# Patient Record
Sex: FEMALE | Race: WHITE | Employment: UNEMPLOYED | URBAN - NONMETROPOLITAN AREA
[De-identification: names, ages, dates, MRNs, and addresses within clinical notes are randomized per-mention and may not be internally consistent; named-entity substitution may affect disease eponyms.]

---

## 2022-03-16 ENCOUNTER — OFFICE VISIT (OUTPATIENT)
Dept: ORTHOPEDIC SURGERY | Age: 77
End: 2022-03-16
Payer: MEDICARE

## 2022-03-16 VITALS — WEIGHT: 210 LBS | HEIGHT: 66 IN | BODY MASS INDEX: 33.75 KG/M2

## 2022-03-16 DIAGNOSIS — M17.11 PRIMARY OSTEOARTHRITIS OF RIGHT KNEE: ICD-10-CM

## 2022-03-16 DIAGNOSIS — M23.203 DEGENERATIVE TEAR OF MEDIAL MENISCUS OF RIGHT KNEE: Primary | ICD-10-CM

## 2022-03-16 DIAGNOSIS — M25.561 RIGHT KNEE PAIN, UNSPECIFIED CHRONICITY: ICD-10-CM

## 2022-03-16 PROCEDURE — 99203 OFFICE O/P NEW LOW 30 MIN: CPT | Performed by: ORTHOPAEDIC SURGERY

## 2022-03-16 NOTE — PROGRESS NOTES
KNEE VISIT      HISTORY OF PRESENT ILLNESS    Patrice Andrade is a 64 y.o. female who presents for evaluation of right knee pain she has had for some time. She said years ago she was treated with some Voltaren gel but did not last long. She is under the care of her back with Dr. Clarisse Iglesias. She complains of grade 3-4 over 10 pain mostly medial side of the right knee with range of motion which increases her pain. She has difficulty with weakness and steps. ROS    Well-documented patient history form dated 3/16/2022  All other ROS negative except for above. Past Surgical history    Past Surgical History:   Procedure Laterality Date    BLADDER REPAIR      CHOLECYSTECTOMY      COLONOSCOPY      HYSTERECTOMY      LUMBAR DISCECTOMY  8/22/11    microlumber L4-5 left. PAST MEDICAL    Past Medical History:   Diagnosis Date    Anesthesia     PONV    Arthritis     Hypertension     Lumbar herniated disc     Lumbar spondylosis     Lumbar stenosis     L3 and L4    Prediabetes     Radiculopathy of lumbar region     Right L4    Scoliosis     Thyroid disease     hypothyroid       Allergies    Allergies   Allergen Reactions    Sulfa Antibiotics      Abdominal pain       Meds    Current Outpatient Medications   Medication Sig Dispense Refill    fluconazole (DIFLUCAN) 100 MG tablet Take 1 tablet by mouth daily. 1 tablet 0    gabapentin (NEURONTIN) 300 MG capsule Take 300 mg by mouth 3 times daily as needed.  lisinopril-hydrochlorothiazide (PRINZIDE;ZESTORETIC) 20-12.5 MG per tablet Take 1 tablet by mouth daily.  diclofenac (VOLTAREN) 75 MG EC tablet Take 75 mg by mouth 2 times daily.  levothyroxine (SYNTHROID) 50 MCG tablet Take 50 mcg by mouth daily.  FLUTICASONE PROPIONATE, NASAL, NA by Nasal route daily.  aspirin 81 MG EC tablet Take 81 mg by mouth daily.  Calcium Carbonate (CALCIUM 600 PO) Take  by mouth 2 times daily.         vitamin E 400 UNIT capsule Take 400 Units by mouth daily.  Ascorbic Acid (VITAMIN C) 500 MG tablet Take 500 mg by mouth daily.  Flaxseed, Linseed, (FLAXSEED OIL PO) Take  by mouth daily.  OMEGA 3-6-9 FATTY ACIDS PO Take  by mouth 2 times daily.  Red Yeast Rice 600 MG CAPS Take 2 tablets by mouth 2 times daily. No current facility-administered medications for this visit. Social    Social History     Socioeconomic History    Marital status:      Spouse name: Not on file    Number of children: Not on file    Years of education: Not on file    Highest education level: Not on file   Occupational History    Not on file   Tobacco Use    Smoking status: Never Smoker    Smokeless tobacco: Never Used   Substance and Sexual Activity    Alcohol use: No    Drug use: No    Sexual activity: Not on file   Other Topics Concern    Not on file   Social History Narrative    Not on file     Social Determinants of Health     Financial Resource Strain:     Difficulty of Paying Living Expenses: Not on file   Food Insecurity:     Worried About Running Out of Food in the Last Year: Not on file    Wood of Food in the Last Year: Not on file   Transportation Needs:     Lack of Transportation (Medical): Not on file    Lack of Transportation (Non-Medical):  Not on file   Physical Activity:     Days of Exercise per Week: Not on file    Minutes of Exercise per Session: Not on file   Stress:     Feeling of Stress : Not on file   Social Connections:     Frequency of Communication with Friends and Family: Not on file    Frequency of Social Gatherings with Friends and Family: Not on file    Attends Anglican Services: Not on file    Active Member of Clubs or Organizations: Not on file    Attends Club or Organization Meetings: Not on file    Marital Status: Not on file   Intimate Partner Violence:     Fear of Current or Ex-Partner: Not on file    Emotionally Abused: Not on file    Physically Abused: Not on file    Sexually Abused: Not on file   Housing Stability:     Unable to Pay for Housing in the Last Year: Not on file    Number of Places Lived in the Last Year: Not on file    Unstable Housing in the Last Year: Not on file       Family HISTORY    Family History   Problem Relation Age of Onset    Diabetes Mother     Cancer Father     Diabetes Sister     Diabetes Brother        PHYSICAL EXAM    Vital Signs:  Ht 5' 6\" (1.676 m)   Wt 210 lb (95.3 kg)   BMI 33.89 kg/m²   General Appearance:  Normal body habitus. Alert and oriented to person, place, and time. Affect:  Normal.   Gait: Slightly antalgic. Good balance and coordination. Skin:  Intact. Sensation:  Intact. Strength:  Intact. Reflexes:  Intact. Pulses:  Intact. Knee Exam:    Effusion: Trace    Range of Motion Right Left   Extension 0 0   Flexion 115 115     Provocative Test Right Left    Positive Negative Positive Negative   Anterior drawer [] [] [] []   Lachman [] [] [] []   Posterior drawer [] [] [] []   Varus testing [] [x] [] [x]   Valgus testing [x] [] [] [x]   Joint line tenderness [x] [] [x] []     Additional Exam Comments: Her neurocirculatory lymphatic exam otherwise is normal symmetric both lower extremities. She has medial joint tenderness to direct palpation valgus stress and Bill's maneuver. IMAGING STUDIES    X-rays 3 views right knee demonstrate grade 2 osteoarthritis good maintenance of joint space medially    IMPRESSION    Right knee pain secondary to degenerative medial meniscus tear and mild osteoarthritis    PLAN      1. Conservative care options including physical therapy, NSAIDs, bracing, and activity modification were discussed. 2.  The indications for therapeutic injections were discussed. 3.  The indications for additional imaging studies were discussed.    4.  After considering the various options discussed, the patient elected to pursue a course that includes cortisone injection right knee, physician directed therapy program, and if not substantial improved over the next several weeks consider scanning. Recommendation is for a cortisone injection into the right knee. After informed consent was received from the patient, the right knee was injected with 1 mL(40mg)Kenalog and 4 mL of 0.25% Marcaine  in the syringe from an anterolateral joint line approach, using a 25-gauge needle, under sterile Betadine prep, using ethyl chloride as a topical refrigerant, for a diagnosis of osteoarthritis. The patient appeared to tolerate it well. The patient should return here periodically as needed. Encounter Diagnoses   Name Primary?     Right knee pain, unspecified chronicity     Primary osteoarthritis of right knee     Degenerative tear of medial meniscus of right knee Yes        Orders Placed This Encounter   Procedures    XR KNEE RIGHT (3 VIEWS)     Standing Status:   Future     Number of Occurrences:   1     Standing Expiration Date:   3/17/2022     Order Specific Question:   Reason for exam:     Answer:   PAIN

## 2022-11-30 ENCOUNTER — OFFICE VISIT (OUTPATIENT)
Dept: ORTHOPEDIC SURGERY | Age: 77
End: 2022-11-30

## 2022-11-30 VITALS — HEIGHT: 66 IN | WEIGHT: 210 LBS | BODY MASS INDEX: 33.75 KG/M2

## 2022-11-30 DIAGNOSIS — M17.11 PRIMARY OSTEOARTHRITIS OF RIGHT KNEE: Primary | ICD-10-CM

## 2022-11-30 RX ORDER — TRIAMCINOLONE ACETONIDE 40 MG/ML
40 INJECTION, SUSPENSION INTRA-ARTICULAR; INTRAMUSCULAR ONCE
Status: COMPLETED | OUTPATIENT
Start: 2022-11-30 | End: 2022-11-30

## 2022-11-30 RX ORDER — BUPIVACAINE HYDROCHLORIDE 2.5 MG/ML
7 INJECTION, SOLUTION INFILTRATION; PERINEURAL ONCE
Status: COMPLETED | OUTPATIENT
Start: 2022-11-30 | End: 2022-11-30

## 2022-11-30 RX ADMIN — BUPIVACAINE HYDROCHLORIDE 17.5 MG: 2.5 INJECTION, SOLUTION INFILTRATION; PERINEURAL at 14:34

## 2022-11-30 RX ADMIN — TRIAMCINOLONE ACETONIDE 40 MG: 40 INJECTION, SUSPENSION INTRA-ARTICULAR; INTRAMUSCULAR at 14:34

## 2022-11-30 NOTE — PROGRESS NOTES
She returns after a month absence for evaluation of her right knee. She had increasingly severe pain in the right knee with no history of injury. She is in pain management. On exam she has no effusion but historically sounds like she did have an effusion a week or so ago. This time she has no gross instability and minimal crepitus. Impression: Right knee pain secondary to osteoarthritis    Recommendation: Cortisone injection right knee, and return on an as-needed basis    Recommendation is for a cortisone injection into the right knee. After informed consent was received from the patient, the right knee was injected with 1 mL of 40mg/ml of Kenalog  and 4 mL of 0.25% Marcaine  in the syringe from an anterolateral joint line approach, using a 25-gauge needle, under sterile Betadine prep, using ethyl chloride as a topical refrigerant, for a diagnosis of osteoarthritis. The patient appeared to tolerate it well. The patient should return here periodically as needed. No diagnosis found. No orders of the defined types were placed in this encounter.

## 2023-07-06 ENCOUNTER — OFFICE VISIT (OUTPATIENT)
Dept: ORTHOPEDIC SURGERY | Age: 78
End: 2023-07-06

## 2023-07-06 VITALS — HEIGHT: 66 IN | BODY MASS INDEX: 33.75 KG/M2 | WEIGHT: 210 LBS

## 2023-07-06 DIAGNOSIS — M17.11 PRIMARY OSTEOARTHRITIS OF RIGHT KNEE: Primary | ICD-10-CM

## 2023-07-06 RX ORDER — TRIAMCINOLONE ACETONIDE 40 MG/ML
40 INJECTION, SUSPENSION INTRA-ARTICULAR; INTRAMUSCULAR ONCE
Status: COMPLETED | OUTPATIENT
Start: 2023-07-06 | End: 2023-07-06

## 2023-07-06 RX ORDER — BUPIVACAINE HYDROCHLORIDE 2.5 MG/ML
7 INJECTION, SOLUTION EPIDURAL; INFILTRATION; INTRACAUDAL ONCE
Status: COMPLETED | OUTPATIENT
Start: 2023-07-06 | End: 2023-07-06

## 2023-07-06 RX ADMIN — BUPIVACAINE HYDROCHLORIDE 17.5 MG: 2.5 INJECTION, SOLUTION EPIDURAL; INFILTRATION; INTRACAUDAL at 11:01

## 2023-07-06 RX ADMIN — TRIAMCINOLONE ACETONIDE 40 MG: 40 INJECTION, SUSPENSION INTRA-ARTICULAR; INTRAMUSCULAR at 11:01

## 2023-07-06 NOTE — PROGRESS NOTES
Recommendation is for a cortisone injection into the right knee. After informed consent was received from the patient, the right knee was injected with 1 mL of 40mg/ml of Kenalog  and 4 mL of 0.25% Marcaine  in the syringe from an anterolateral joint line approach, using a 25-gauge needle, under sterile Betadine prep, using ethyl chloride as a topical refrigerant, for a diagnosis of osteoarthritis. The patient appeared to tolerate it well. The patient should return here periodically as needed. No diagnosis found. No orders of the defined types were placed in this encounter. She also appears to having diminishing returns on the injection as far as length of relief. Because of this I recommend she make an appointment with Dr. Rose Cerna for the potential for knee replacement surgery.

## 2023-09-12 ENCOUNTER — OFFICE VISIT (OUTPATIENT)
Dept: ORTHOPEDIC SURGERY | Age: 78
End: 2023-09-12

## 2023-09-12 DIAGNOSIS — M17.11 PRIMARY OSTEOARTHRITIS OF RIGHT KNEE: Primary | ICD-10-CM

## 2023-09-12 DIAGNOSIS — M25.561 RIGHT KNEE PAIN, UNSPECIFIED CHRONICITY: ICD-10-CM

## 2023-09-12 NOTE — PROGRESS NOTES
Chief Complaint  Knee Pain (Np rt knee/khan ref)      History of Present Illness:  Kathleen Duke is a pleasant 76 y.o. female here today for new patient evaluation regarding her right knee. She was referred to me by Dr. Petey Rincon. The patient has failed steroid injections. The patient is having some progressively worsening pain especially to the anterior aspect of the knee when she goes into extension. She has some popping clicking and grinding. She would like to try to avoid a total knee replacement if possible. Medical History:  Patient's medications, allergies, past medical, surgical, social and family histories were reviewed and updated as appropriate. Pertinent items are noted in HPI  Review of systems reviewed from Patient History Form dated on 9/12/23 and available in the patient's chart under the Media tab. Vital Signs: There were no vitals filed for this visit. Constitutional: In no apparent distress. Normal affect. Alert and oriented X3 and is cooperative. Right knee exam    Gait: No use of assistive devices. Mild antalgic gait noted. Alignment: normal alignment. Inspection/skin: Skin is intact without erythema or ecchymosis. No gross deformity. Palpation: Moderate crepitation to the patellofemoral joint. Tender to the medial and lateral joint spaces. Range of Motion: 15 to 110 degrees. Strength: Normal quadriceps development. Effusion: No effusion or swelling present. Ligamentous stability: No cruciate or collateral ligament instability. Neurologic and vascular: Skin is warm and well-perfused. Sensation is intact to light-touch. Special tests: Negative Bill sign. Radiology:       4 views of the right knee taken in the office today demonstrate mild to moderate tricompartmental osteoarthritis. Assessment : 49-year-old female with right knee osteoarthritis    Impression:  Encounter Diagnoses   Name Primary?     Right

## 2023-10-13 ENCOUNTER — TELEPHONE (OUTPATIENT)
Dept: ORTHOPEDIC SURGERY | Age: 78
End: 2023-10-13

## 2023-10-13 NOTE — TELEPHONE ENCOUNTER
Appointment Request     Patient requesting earlier appointment: Yes  Appointment offered to patient: ALREADY HAS APPT   Patient Contact Number: [phone]    Pt IS ASKING IF SHE CAN BE WORKED INTO THE SCHEDULE THIS WEEK. SHE IS IN GREAT PAIN FOR HER KNEE AND WOULD LIKE TO BE SEEN 10/17 INSTEAD OF 10/24.

## 2023-10-16 ENCOUNTER — TELEPHONE (OUTPATIENT)
Dept: ORTHOPEDIC SURGERY | Age: 78
End: 2023-10-16

## 2023-10-16 NOTE — TELEPHONE ENCOUNTER
Appointment Request     Patient requesting earlier appointment: Yes  Appointment offered to patient: YES   Patient Contact Number: [phone]

## 2023-10-19 ENCOUNTER — OFFICE VISIT (OUTPATIENT)
Dept: ORTHOPEDIC SURGERY | Age: 78
End: 2023-10-19
Payer: MEDICARE

## 2023-10-19 DIAGNOSIS — M17.11 PRIMARY OSTEOARTHRITIS OF RIGHT KNEE: ICD-10-CM

## 2023-10-19 DIAGNOSIS — M25.561 RIGHT KNEE PAIN, UNSPECIFIED CHRONICITY: Primary | ICD-10-CM

## 2023-10-19 PROCEDURE — 99214 OFFICE O/P EST MOD 30 MIN: CPT | Performed by: STUDENT IN AN ORGANIZED HEALTH CARE EDUCATION/TRAINING PROGRAM

## 2023-10-19 PROCEDURE — 1123F ACP DISCUSS/DSCN MKR DOCD: CPT | Performed by: STUDENT IN AN ORGANIZED HEALTH CARE EDUCATION/TRAINING PROGRAM

## 2023-10-19 RX ORDER — METHYLPREDNISOLONE 4 MG/1
TABLET ORAL
Qty: 21 KIT | Refills: 0 | Status: SHIPPED | OUTPATIENT
Start: 2023-10-19

## 2023-10-19 NOTE — PROGRESS NOTES
Chief Complaint  Knee Pain (Ck rt knee)      History of Present Illness: The patient is here for repeat evaluation of her right knee. The patient underwent an Orthovisc injection 6 weeks ago. Unfortunately the patient did not notice any relief and her knee pain is really starting to bother her day-to-day life. She is hoping to discuss a total knee replacement at this point. She controlled her pain currently with antiinflammatory medications like Advil and Tylenol arthritis. The patient is prediabetic but well controlled with A1c about 6.5. She only takes an aspirin but does have a history of a heart stent. Prior HPI 9/12/2023:  Lisette Villela is a pleasant 78 y.o. female here today for new patient evaluation regarding her right knee. She was referred to me by Dr. Serjio Mae. The patient has failed steroid injections. The patient is having some progressively worsening pain especially to the anterior aspect of the knee when she goes into extension. She has some popping clicking and grinding. She would like to try to avoid a total knee replacement if possible. Medical History:  Patient's medications, allergies, past medical, surgical, social and family histories were reviewed and updated as appropriate. Pertinent items are noted in HPI  Review of systems reviewed from Patient History Form dated on 10/19/23 and available in the patient's chart under the Media tab. Vital Signs: There were no vitals filed for this visit. Constitutional: In no apparent distress. Normal affect. Alert and oriented X3 and is cooperative. Right knee exam    Gait: No use of assistive devices. Mild antalgic gait noted. Alignment: normal alignment. Inspection/skin: Skin is intact without erythema or ecchymosis. No gross deformity. Palpation: Moderate crepitation to the patellofemoral joint. Tender to the medial and lateral joint spaces. Range of Motion: 15 to 110 degrees.     Strength:

## 2023-10-20 ENCOUNTER — TELEPHONE (OUTPATIENT)
Dept: ORTHOPEDIC SURGERY | Age: 78
End: 2023-10-20

## 2023-10-20 NOTE — TELEPHONE ENCOUNTER
Patient called and VM was left informing that we can change surgery to desired date. Arrival time and date were given and call back number was given if patient would like to discuss.

## 2023-10-27 DIAGNOSIS — M17.11 PRIMARY OSTEOARTHRITIS OF RIGHT KNEE: Primary | ICD-10-CM

## 2023-10-27 RX ORDER — TRAMADOL HYDROCHLORIDE 50 MG/1
50 TABLET ORAL EVERY 6 HOURS PRN
Qty: 28 TABLET | Refills: 0 | Status: SHIPPED | OUTPATIENT
Start: 2023-10-27 | End: 2023-11-03

## 2023-11-01 ENCOUNTER — TELEPHONE (OUTPATIENT)
Dept: ORTHOPEDIC SURGERY | Age: 78
End: 2023-11-01

## 2023-11-15 DIAGNOSIS — M17.11 PRIMARY OSTEOARTHRITIS OF RIGHT KNEE: ICD-10-CM

## 2023-11-15 RX ORDER — TRAMADOL HYDROCHLORIDE 50 MG/1
50 TABLET ORAL EVERY 6 HOURS PRN
Qty: 28 TABLET | Refills: 0 | Status: SHIPPED | OUTPATIENT
Start: 2023-11-15 | End: 2023-11-22

## 2023-11-21 ENCOUNTER — OFFICE VISIT (OUTPATIENT)
Dept: ORTHOPEDIC SURGERY | Age: 78
End: 2023-11-21
Payer: MEDICARE

## 2023-11-21 DIAGNOSIS — M17.11 PRIMARY OSTEOARTHRITIS OF RIGHT KNEE: Primary | ICD-10-CM

## 2023-11-21 PROCEDURE — 1123F ACP DISCUSS/DSCN MKR DOCD: CPT | Performed by: STUDENT IN AN ORGANIZED HEALTH CARE EDUCATION/TRAINING PROGRAM

## 2023-11-21 PROCEDURE — 99214 OFFICE O/P EST MOD 30 MIN: CPT | Performed by: STUDENT IN AN ORGANIZED HEALTH CARE EDUCATION/TRAINING PROGRAM

## 2023-11-21 NOTE — PROGRESS NOTES
Chief Complaint  Knee Pain (Ck rt knee)      History of Present Illness: The patient is here repeat evaluation of her right knee. The patient is getting up for knee replacement surgery in the coming month or 2. The patient reports good relief of her arthritic pain from the tramadol prescription I sent her. Prior HPI 10/19/2023:  The patient is here for repeat evaluation of her right knee. The patient underwent an Orthovisc injection 6 weeks ago. Unfortunately the patient did not notice any relief and her knee pain is really starting to bother her day-to-day life. She is hoping to discuss a total knee replacement at this point. She controlled her pain currently with antiinflammatory medications like Advil and Tylenol arthritis. The patient is prediabetic but well controlled with A1c about 6.5. She only takes an aspirin but does have a history of a heart stent. Prior HPI 9/12/2023:  Pasha Lockwood is a pleasant 78 y.o. female here today for new patient evaluation regarding her right knee. She was referred to me by Dr. Gucci Mann. The patient has failed steroid injections. The patient is having some progressively worsening pain especially to the anterior aspect of the knee when she goes into extension. She has some popping clicking and grinding. She would like to try to avoid a total knee replacement if possible. Medical History:  Patient's medications, allergies, past medical, surgical, social and family histories were reviewed and updated as appropriate. Pertinent items are noted in HPI  Review of systems reviewed from Patient History Form dated on 11/21/23 and available in the patient's chart under the Media tab. Vital Signs: There were no vitals filed for this visit. Constitutional: In no apparent distress. Normal affect. Alert and oriented X3 and is cooperative. Right knee exam    Gait: No use of assistive devices. Mild antalgic gait noted.     Alignment: normal

## 2023-11-29 ENCOUNTER — TELEPHONE (OUTPATIENT)
Dept: ORTHOPEDIC SURGERY | Age: 78
End: 2023-11-29

## 2023-11-29 DIAGNOSIS — M17.11 PRIMARY OSTEOARTHRITIS OF RIGHT KNEE: ICD-10-CM

## 2023-11-29 RX ORDER — TRAMADOL HYDROCHLORIDE 50 MG/1
50 TABLET ORAL EVERY 6 HOURS PRN
Qty: 28 TABLET | Refills: 0 | Status: SHIPPED | OUTPATIENT
Start: 2023-11-29 | End: 2023-12-06

## 2023-11-29 NOTE — TELEPHONE ENCOUNTER
Auth: # [id number]    Date Range: Dec 14, 2023 - Dec 31, 2023  Type of SX:  OUTPATIENT  Location: [facility]  CPT: 42749   DX Code: M17.11  SX area: [facility]  Insurance: [address] [place] [address]

## 2023-12-01 ENCOUNTER — ANESTHESIA EVENT (OUTPATIENT)
Dept: OPERATING ROOM | Age: 78
End: 2023-12-01
Payer: MEDICARE

## 2023-12-04 ENCOUNTER — TELEPHONE (OUTPATIENT)
Dept: ORTHOPEDIC SURGERY | Age: 78
End: 2023-12-04

## 2023-12-04 NOTE — TELEPHONE ENCOUNTER
Orthopedic Nurse Navigator Summary    Patient Name:  Fabiana Ceballos  Anticipated Date of Surgery:  12/14/23  Attended Pre-op Education Class:   PCP: CLAY Dobson  Date of PCP visit for H&P: 12/01/23  Is patient in a Pain Management program:  Review of Medical history reveals history of: HTN, Dyslipidemia, Coronary atherosclerosis- stent, Hypothyroidism, Prediabetes, PONV, Back pain    Critical Lab Values  - Hemoglobin (g/dL):  Date: 12/01/23 Value 14.1  - Hematocrit(%): Date:  12/01/23 Value 40.8  - HgbA1C:  Date: 12/01/23 Value 6.4  - Albumin:  Date: 12/01/23  Value 4.4  - BUN:  Date: 12/01/23  Value 22  - Creatinine:  Date: 12/01/23  Value 0.69    Coronary Artery Disease/HTN/CHF history: Yes  Cardiologist:  Cardiac clearance necessary:  Yes  Date of cardiac clearance appt: 12/04/23  Final Cardiac recommendations:  On any anticoagulation: Aspirin 81 mg QD    Diabetes History:  Prediabetes  Most recent HgbA1C: 6.4  Pulmonary:  COPD/Emphysema/Use of home oxygen: No  Alcohol use: No    BMI greater than 40 at time of scheduling:  No  Additional medical concerns:  Additional recommendations for above concerns:  Attended Pre-Hab program:    Anticipated Discharge Disposition:    Who will be with patient at home following discharge:  Home with [facility]  Equipment patient already has:  family  Bedroom on first or second floor:    Bathroom on first or second floor:    Weight bearing status:  wbat  Pre-op ambulatory status: painful ambulation  Number of entry steps:    Caregiver assistance:      Suzanne Sanz RN  Date:  12/04/23

## 2023-12-06 RX ORDER — METOPROLOL SUCCINATE 25 MG/1
25 TABLET, EXTENDED RELEASE ORAL DAILY
COMMUNITY

## 2023-12-06 RX ORDER — ATORVASTATIN CALCIUM 10 MG/1
10 TABLET, FILM COATED ORAL DAILY
COMMUNITY

## 2023-12-06 RX ORDER — FUROSEMIDE 20 MG/1
20 TABLET ORAL DAILY
COMMUNITY

## 2023-12-06 RX ORDER — IBUPROFEN 200 MG
200 TABLET ORAL EVERY 6 HOURS PRN
COMMUNITY
End: 2023-12-06 | Stop reason: ALTCHOICE

## 2023-12-06 RX ORDER — CINNAMON BARK
1 POWDER (GRAM) MISCELLANEOUS DAILY
COMMUNITY

## 2023-12-06 RX ORDER — LANOLIN ALCOHOL/MO/W.PET/CERES
3 CREAM (GRAM) TOPICAL NIGHTLY PRN
COMMUNITY

## 2023-12-06 RX ORDER — OMEPRAZOLE 20 MG/1
20 CAPSULE, DELAYED RELEASE ORAL DAILY
COMMUNITY

## 2023-12-06 RX ORDER — AMLODIPINE BESYLATE 5 MG/1
5 TABLET ORAL DAILY
COMMUNITY

## 2023-12-06 RX ORDER — M-VIT,TX,IRON,MINS/CALC/FOLIC 27MG-0.4MG
1 TABLET ORAL DAILY
COMMUNITY

## 2023-12-06 RX ORDER — ESTRADIOL 10 UG/1
10 INSERT VAGINAL DAILY
COMMUNITY

## 2023-12-06 NOTE — PROGRESS NOTES

## 2023-12-12 NOTE — ANESTHESIA PRE PROCEDURE
results found for: \"LABABO\", \"LABRH\"    Drug/Infectious Status (If Applicable):  No results found for: \"HIV\", \"HEPCAB\"    COVID-19 Screening (If Applicable): No results found for: \"COVID19\"        Anesthesia Evaluation  Patient summary reviewed and Nursing notes reviewed   history of anesthetic complications: PONV. Airway: Mallampati: II  TM distance: >3 FB   Neck ROM: full  Mouth opening: > = 3 FB   Dental:          Pulmonary:Negative Pulmonary ROS                              Cardiovascular:    (+) hypertension:                  Neuro/Psych:   (+) neuromuscular disease:            GI/Hepatic/Renal: Neg GI/Hepatic/Renal ROS       (-) GERD, liver disease and no renal disease       Endo/Other:    (+) hypothyroidism::..    (-) diabetes mellitus               Abdominal:             Vascular: negative vascular ROS. Other Findings:             Anesthesia Plan      spinal and regional     ASA 3     (I discussed with the patient the risks and benefits of PIV, spinal anesthesia, IV Narcotics, PACU. All questions were answered the patient agrees with the plan. Adductor canal and Ipack blocks for post op pain.  )  Induction: intravenous. MIPS: Prophylactic antiemetics administered. Anesthetic plan and risks discussed with patient. Plan discussed with CRNA.                     Prabhu Urrutia MD   12/12/2023

## 2023-12-14 ENCOUNTER — APPOINTMENT (OUTPATIENT)
Dept: GENERAL RADIOLOGY | Age: 78
End: 2023-12-14
Attending: STUDENT IN AN ORGANIZED HEALTH CARE EDUCATION/TRAINING PROGRAM
Payer: MEDICARE

## 2023-12-14 ENCOUNTER — HOSPITAL ENCOUNTER (INPATIENT)
Age: 78
LOS: 2 days | Discharge: NURSING, REHAB OR LONG-TERM CARE | End: 2023-12-16
Attending: STUDENT IN AN ORGANIZED HEALTH CARE EDUCATION/TRAINING PROGRAM | Admitting: STUDENT IN AN ORGANIZED HEALTH CARE EDUCATION/TRAINING PROGRAM
Payer: MEDICARE

## 2023-12-14 ENCOUNTER — ANESTHESIA (OUTPATIENT)
Dept: OPERATING ROOM | Age: 78
End: 2023-12-14
Payer: MEDICARE

## 2023-12-14 DIAGNOSIS — Z96.651 H/O TOTAL KNEE REPLACEMENT, RIGHT: Primary | ICD-10-CM

## 2023-12-14 PROBLEM — M17.11 ARTHRITIS OF RIGHT KNEE: Status: ACTIVE | Noted: 2023-12-14

## 2023-12-14 LAB
ABO + RH BLD: NORMAL
ANION GAP SERPL CALCULATED.3IONS-SCNC: 9 MMOL/L (ref 3–16)
APTT BLD: 25.6 SEC (ref 22.7–35.9)
BLD GP AB SCN SERPL QL: NORMAL
BUN SERPL-MCNC: 16 MG/DL (ref 7–20)
CALCIUM SERPL-MCNC: 9.3 MG/DL (ref 8.3–10.6)
CHLORIDE SERPL-SCNC: 104 MMOL/L (ref 99–110)
CO2 SERPL-SCNC: 27 MMOL/L (ref 21–32)
CREAT SERPL-MCNC: <0.5 MG/DL (ref 0.6–1.2)
DEPRECATED RDW RBC AUTO: 12.8 % (ref 12.4–15.4)
GFR SERPLBLD CREATININE-BSD FMLA CKD-EPI: >60 ML/MIN/{1.73_M2}
GLUCOSE SERPL-MCNC: 99 MG/DL (ref 70–99)
HCT VFR BLD AUTO: 39.4 % (ref 36–48)
HGB BLD-MCNC: 13.2 G/DL (ref 12–16)
INR PPP: 0.96 (ref 0.84–1.16)
MAGNESIUM SERPL-MCNC: 1.9 MG/DL (ref 1.8–2.4)
MCH RBC QN AUTO: 32 PG (ref 26–34)
MCHC RBC AUTO-ENTMCNC: 33.4 G/DL (ref 31–36)
MCV RBC AUTO: 95.7 FL (ref 80–100)
PLATELET # BLD AUTO: 225 K/UL (ref 135–450)
PMV BLD AUTO: 8.2 FL (ref 5–10.5)
POTASSIUM SERPL-SCNC: 3.5 MMOL/L (ref 3.5–5.1)
PROTHROMBIN TIME: 12.8 SEC (ref 11.5–14.8)
RBC # BLD AUTO: 4.11 M/UL (ref 4–5.2)
SODIUM SERPL-SCNC: 140 MMOL/L (ref 136–145)
WBC # BLD AUTO: 7.3 K/UL (ref 4–11)

## 2023-12-14 PROCEDURE — 6360000002 HC RX W HCPCS: Performed by: NURSE ANESTHETIST, CERTIFIED REGISTERED

## 2023-12-14 PROCEDURE — 7100000001 HC PACU RECOVERY - ADDTL 15 MIN: Performed by: STUDENT IN AN ORGANIZED HEALTH CARE EDUCATION/TRAINING PROGRAM

## 2023-12-14 PROCEDURE — 6360000002 HC RX W HCPCS: Performed by: STUDENT IN AN ORGANIZED HEALTH CARE EDUCATION/TRAINING PROGRAM

## 2023-12-14 PROCEDURE — L1830 KO IMMOB CANVAS LONG PRE OTS: HCPCS | Performed by: STUDENT IN AN ORGANIZED HEALTH CARE EDUCATION/TRAINING PROGRAM

## 2023-12-14 PROCEDURE — 2580000003 HC RX 258: Performed by: STUDENT IN AN ORGANIZED HEALTH CARE EDUCATION/TRAINING PROGRAM

## 2023-12-14 PROCEDURE — 2580000003 HC RX 258: Performed by: ANESTHESIOLOGY

## 2023-12-14 PROCEDURE — 64447 NJX AA&/STRD FEMORAL NRV IMG: CPT | Performed by: ANESTHESIOLOGY

## 2023-12-14 PROCEDURE — 86900 BLOOD TYPING SEROLOGIC ABO: CPT

## 2023-12-14 PROCEDURE — 36415 COLL VENOUS BLD VENIPUNCTURE: CPT

## 2023-12-14 PROCEDURE — C1776 JOINT DEVICE (IMPLANTABLE): HCPCS | Performed by: STUDENT IN AN ORGANIZED HEALTH CARE EDUCATION/TRAINING PROGRAM

## 2023-12-14 PROCEDURE — 85610 PROTHROMBIN TIME: CPT

## 2023-12-14 PROCEDURE — 2580000003 HC RX 258: Performed by: NURSE ANESTHETIST, CERTIFIED REGISTERED

## 2023-12-14 PROCEDURE — 2500000003 HC RX 250 WO HCPCS: Performed by: NURSE ANESTHETIST, CERTIFIED REGISTERED

## 2023-12-14 PROCEDURE — 80048 BASIC METABOLIC PNL TOTAL CA: CPT

## 2023-12-14 PROCEDURE — 94150 VITAL CAPACITY TEST: CPT

## 2023-12-14 PROCEDURE — 6370000000 HC RX 637 (ALT 250 FOR IP): Performed by: STUDENT IN AN ORGANIZED HEALTH CARE EDUCATION/TRAINING PROGRAM

## 2023-12-14 PROCEDURE — 3600000004 HC SURGERY LEVEL 4 BASE: Performed by: STUDENT IN AN ORGANIZED HEALTH CARE EDUCATION/TRAINING PROGRAM

## 2023-12-14 PROCEDURE — 3700000000 HC ANESTHESIA ATTENDED CARE: Performed by: STUDENT IN AN ORGANIZED HEALTH CARE EDUCATION/TRAINING PROGRAM

## 2023-12-14 PROCEDURE — 2500000003 HC RX 250 WO HCPCS: Performed by: STUDENT IN AN ORGANIZED HEALTH CARE EDUCATION/TRAINING PROGRAM

## 2023-12-14 PROCEDURE — 7100000000 HC PACU RECOVERY - FIRST 15 MIN: Performed by: STUDENT IN AN ORGANIZED HEALTH CARE EDUCATION/TRAINING PROGRAM

## 2023-12-14 PROCEDURE — 2500000003 HC RX 250 WO HCPCS: Performed by: ANESTHESIOLOGY

## 2023-12-14 PROCEDURE — 3600000014 HC SURGERY LEVEL 4 ADDTL 15MIN: Performed by: STUDENT IN AN ORGANIZED HEALTH CARE EDUCATION/TRAINING PROGRAM

## 2023-12-14 PROCEDURE — 86850 RBC ANTIBODY SCREEN: CPT

## 2023-12-14 PROCEDURE — 86901 BLOOD TYPING SEROLOGIC RH(D): CPT

## 2023-12-14 PROCEDURE — 83735 ASSAY OF MAGNESIUM: CPT

## 2023-12-14 PROCEDURE — 3700000001 HC ADD 15 MINUTES (ANESTHESIA): Performed by: STUDENT IN AN ORGANIZED HEALTH CARE EDUCATION/TRAINING PROGRAM

## 2023-12-14 PROCEDURE — 1200000000 HC SEMI PRIVATE

## 2023-12-14 PROCEDURE — 2720000010 HC SURG SUPPLY STERILE: Performed by: STUDENT IN AN ORGANIZED HEALTH CARE EDUCATION/TRAINING PROGRAM

## 2023-12-14 PROCEDURE — 6360000002 HC RX W HCPCS: Performed by: ANESTHESIOLOGY

## 2023-12-14 PROCEDURE — C1713 ANCHOR/SCREW BN/BN,TIS/BN: HCPCS | Performed by: STUDENT IN AN ORGANIZED HEALTH CARE EDUCATION/TRAINING PROGRAM

## 2023-12-14 PROCEDURE — 85027 COMPLETE CBC AUTOMATED: CPT

## 2023-12-14 PROCEDURE — 6370000000 HC RX 637 (ALT 250 FOR IP): Performed by: ANESTHESIOLOGY

## 2023-12-14 PROCEDURE — 2709999900 HC NON-CHARGEABLE SUPPLY: Performed by: STUDENT IN AN ORGANIZED HEALTH CARE EDUCATION/TRAINING PROGRAM

## 2023-12-14 PROCEDURE — 85730 THROMBOPLASTIN TIME PARTIAL: CPT

## 2023-12-14 PROCEDURE — A4216 STERILE WATER/SALINE, 10 ML: HCPCS | Performed by: ANESTHESIOLOGY

## 2023-12-14 PROCEDURE — 73560 X-RAY EXAM OF KNEE 1 OR 2: CPT

## 2023-12-14 DEVICE — JOURNEY TIBIAL BASEPLATE NONPOROUS                                    RIGHT SIZE 4
Type: IMPLANTABLE DEVICE | Site: KNEE | Status: FUNCTIONAL
Brand: JOURNEY

## 2023-12-14 DEVICE — TOBRA FULL DOSE ANTIBIOTIC BONE CEMENT, 10 PACK CATALOG NUMBER IS 6197-9-010
Type: IMPLANTABLE DEVICE | Status: FUNCTIONAL
Brand: SIMPLEX

## 2023-12-14 DEVICE — JOURNEY II BCS XLPE ARTICULAR                                    INSERT SIZE 3-4 RIGHT 11MM
Type: IMPLANTABLE DEVICE | Site: KNEE | Status: FUNCTIONAL
Brand: JOURNEY

## 2023-12-14 DEVICE — JOURNEY 7.5 ROUND RESURF PAT 35MM STANDARD
Type: IMPLANTABLE DEVICE | Site: KNEE | Status: FUNCTIONAL
Brand: JOURNEY

## 2023-12-14 DEVICE — JOURNEY II BCS FEMORAL OXINIUM                                    RIGHT SIZE 5
Type: IMPLANTABLE DEVICE | Site: KNEE | Status: FUNCTIONAL
Brand: JOURNEY

## 2023-12-14 RX ORDER — SODIUM CHLORIDE 0.9 % (FLUSH) 0.9 %
5-40 SYRINGE (ML) INJECTION PRN
Status: DISCONTINUED | OUTPATIENT
Start: 2023-12-14 | End: 2023-12-14 | Stop reason: HOSPADM

## 2023-12-14 RX ORDER — POLYETHYLENE GLYCOL 3350 17 G/17G
17 POWDER, FOR SOLUTION ORAL DAILY
Status: DISCONTINUED | OUTPATIENT
Start: 2023-12-14 | End: 2023-12-16 | Stop reason: HOSPADM

## 2023-12-14 RX ORDER — DEXAMETHASONE SODIUM PHOSPHATE 4 MG/ML
INJECTION, SOLUTION INTRA-ARTICULAR; INTRALESIONAL; INTRAMUSCULAR; INTRAVENOUS; SOFT TISSUE PRN
Status: DISCONTINUED | OUTPATIENT
Start: 2023-12-14 | End: 2023-12-14 | Stop reason: SDUPTHER

## 2023-12-14 RX ORDER — ROCURONIUM BROMIDE 10 MG/ML
INJECTION, SOLUTION INTRAVENOUS PRN
Status: DISCONTINUED | OUTPATIENT
Start: 2023-12-14 | End: 2023-12-14 | Stop reason: SDUPTHER

## 2023-12-14 RX ORDER — ONDANSETRON 2 MG/ML
INJECTION INTRAMUSCULAR; INTRAVENOUS PRN
Status: DISCONTINUED | OUTPATIENT
Start: 2023-12-14 | End: 2023-12-14 | Stop reason: SDUPTHER

## 2023-12-14 RX ORDER — LISINOPRIL 20 MG/1
20 TABLET ORAL DAILY
Status: DISCONTINUED | OUTPATIENT
Start: 2023-12-14 | End: 2023-12-16 | Stop reason: HOSPADM

## 2023-12-14 RX ORDER — SODIUM CHLORIDE 0.9 % (FLUSH) 0.9 %
5-40 SYRINGE (ML) INJECTION PRN
Status: DISCONTINUED | OUTPATIENT
Start: 2023-12-14 | End: 2023-12-16 | Stop reason: HOSPADM

## 2023-12-14 RX ORDER — SODIUM CHLORIDE, SODIUM LACTATE, POTASSIUM CHLORIDE, CALCIUM CHLORIDE 600; 310; 30; 20 MG/100ML; MG/100ML; MG/100ML; MG/100ML
INJECTION, SOLUTION INTRAVENOUS CONTINUOUS
Status: DISCONTINUED | OUTPATIENT
Start: 2023-12-14 | End: 2023-12-14

## 2023-12-14 RX ORDER — FENTANYL CITRATE 50 UG/ML
25 INJECTION, SOLUTION INTRAMUSCULAR; INTRAVENOUS EVERY 5 MIN PRN
Status: DISCONTINUED | OUTPATIENT
Start: 2023-12-14 | End: 2023-12-14 | Stop reason: HOSPADM

## 2023-12-14 RX ORDER — SODIUM CHLORIDE 9 MG/ML
INJECTION, SOLUTION INTRAVENOUS PRN
Status: DISCONTINUED | OUTPATIENT
Start: 2023-12-14 | End: 2023-12-14 | Stop reason: HOSPADM

## 2023-12-14 RX ORDER — LISINOPRIL AND HYDROCHLOROTHIAZIDE 20; 12.5 MG/1; MG/1
1 TABLET ORAL DAILY
Status: DISCONTINUED | OUTPATIENT
Start: 2023-12-14 | End: 2023-12-14 | Stop reason: CLARIF

## 2023-12-14 RX ORDER — LEVOTHYROXINE SODIUM 0.05 MG/1
50 TABLET ORAL DAILY
Status: DISCONTINUED | OUTPATIENT
Start: 2023-12-14 | End: 2023-12-16 | Stop reason: HOSPADM

## 2023-12-14 RX ORDER — METOPROLOL SUCCINATE 25 MG/1
25 TABLET, EXTENDED RELEASE ORAL DAILY
Status: DISCONTINUED | OUTPATIENT
Start: 2023-12-14 | End: 2023-12-15

## 2023-12-14 RX ORDER — ONDANSETRON 2 MG/ML
4 INJECTION INTRAMUSCULAR; INTRAVENOUS
Status: DISCONTINUED | OUTPATIENT
Start: 2023-12-14 | End: 2023-12-14 | Stop reason: HOSPADM

## 2023-12-14 RX ORDER — GABAPENTIN 400 MG/1
400 CAPSULE ORAL 3 TIMES DAILY
Status: DISCONTINUED | OUTPATIENT
Start: 2023-12-14 | End: 2023-12-16 | Stop reason: HOSPADM

## 2023-12-14 RX ORDER — HYDROCHLOROTHIAZIDE 25 MG/1
12.5 TABLET ORAL DAILY
Status: DISCONTINUED | OUTPATIENT
Start: 2023-12-14 | End: 2023-12-16 | Stop reason: HOSPADM

## 2023-12-14 RX ORDER — ACETAMINOPHEN 325 MG/1
650 TABLET ORAL EVERY 6 HOURS
Status: DISCONTINUED | OUTPATIENT
Start: 2023-12-14 | End: 2023-12-16 | Stop reason: HOSPADM

## 2023-12-14 RX ORDER — PREGABALIN 25 MG/1
75 CAPSULE ORAL ONCE
Status: COMPLETED | OUTPATIENT
Start: 2023-12-14 | End: 2023-12-14

## 2023-12-14 RX ORDER — ONDANSETRON 2 MG/ML
4 INJECTION INTRAMUSCULAR; INTRAVENOUS EVERY 6 HOURS PRN
Status: DISCONTINUED | OUTPATIENT
Start: 2023-12-14 | End: 2023-12-16 | Stop reason: HOSPADM

## 2023-12-14 RX ORDER — AMLODIPINE BESYLATE 5 MG/1
5 TABLET ORAL DAILY
Status: DISCONTINUED | OUTPATIENT
Start: 2023-12-14 | End: 2023-12-16 | Stop reason: HOSPADM

## 2023-12-14 RX ORDER — OXYCODONE HYDROCHLORIDE 5 MG/1
10 TABLET ORAL PRN
Status: COMPLETED | OUTPATIENT
Start: 2023-12-14 | End: 2023-12-14

## 2023-12-14 RX ORDER — DEXAMETHASONE 4 MG/1
10 TABLET ORAL ONCE
Status: COMPLETED | OUTPATIENT
Start: 2023-12-14 | End: 2023-12-14

## 2023-12-14 RX ORDER — SODIUM CHLORIDE, SODIUM LACTATE, POTASSIUM CHLORIDE, CALCIUM CHLORIDE 600; 310; 30; 20 MG/100ML; MG/100ML; MG/100ML; MG/100ML
INJECTION, SOLUTION INTRAVENOUS CONTINUOUS PRN
Status: DISCONTINUED | OUTPATIENT
Start: 2023-12-14 | End: 2023-12-14 | Stop reason: SDUPTHER

## 2023-12-14 RX ORDER — SODIUM CHLORIDE 0.9 % (FLUSH) 0.9 %
5-40 SYRINGE (ML) INJECTION EVERY 12 HOURS SCHEDULED
Status: DISCONTINUED | OUTPATIENT
Start: 2023-12-14 | End: 2023-12-16 | Stop reason: HOSPADM

## 2023-12-14 RX ORDER — SODIUM CHLORIDE 0.9 % (FLUSH) 0.9 %
5-40 SYRINGE (ML) INJECTION EVERY 12 HOURS SCHEDULED
Status: DISCONTINUED | OUTPATIENT
Start: 2023-12-14 | End: 2023-12-14 | Stop reason: HOSPADM

## 2023-12-14 RX ORDER — OXYCODONE HYDROCHLORIDE 5 MG/1
5 TABLET ORAL PRN
Status: COMPLETED | OUTPATIENT
Start: 2023-12-14 | End: 2023-12-14

## 2023-12-14 RX ORDER — OXYCODONE HYDROCHLORIDE 5 MG/1
5 TABLET ORAL EVERY 4 HOURS PRN
Status: DISCONTINUED | OUTPATIENT
Start: 2023-12-14 | End: 2023-12-16 | Stop reason: HOSPADM

## 2023-12-14 RX ORDER — LIDOCAINE HYDROCHLORIDE 20 MG/ML
INJECTION, SOLUTION EPIDURAL; INFILTRATION; INTRACAUDAL; PERINEURAL PRN
Status: DISCONTINUED | OUTPATIENT
Start: 2023-12-14 | End: 2023-12-14 | Stop reason: SDUPTHER

## 2023-12-14 RX ORDER — ATORVASTATIN CALCIUM 10 MG/1
10 TABLET, FILM COATED ORAL DAILY
Status: DISCONTINUED | OUTPATIENT
Start: 2023-12-14 | End: 2023-12-16 | Stop reason: HOSPADM

## 2023-12-14 RX ORDER — FENTANYL CITRATE 50 UG/ML
50 INJECTION, SOLUTION INTRAMUSCULAR; INTRAVENOUS EVERY 5 MIN PRN
Status: DISCONTINUED | OUTPATIENT
Start: 2023-12-14 | End: 2023-12-14 | Stop reason: HOSPADM

## 2023-12-14 RX ORDER — ASPIRIN 81 MG/1
81 TABLET ORAL 2 TIMES DAILY
Status: DISCONTINUED | OUTPATIENT
Start: 2023-12-15 | End: 2023-12-16 | Stop reason: HOSPADM

## 2023-12-14 RX ORDER — ONDANSETRON 4 MG/1
4 TABLET, ORALLY DISINTEGRATING ORAL EVERY 8 HOURS PRN
Status: DISCONTINUED | OUTPATIENT
Start: 2023-12-14 | End: 2023-12-16 | Stop reason: HOSPADM

## 2023-12-14 RX ORDER — PROPOFOL 10 MG/ML
INJECTION, EMULSION INTRAVENOUS PRN
Status: DISCONTINUED | OUTPATIENT
Start: 2023-12-14 | End: 2023-12-14 | Stop reason: SDUPTHER

## 2023-12-14 RX ORDER — SODIUM CHLORIDE 9 MG/ML
INJECTION, SOLUTION INTRAVENOUS PRN
Status: DISCONTINUED | OUTPATIENT
Start: 2023-12-14 | End: 2023-12-16 | Stop reason: HOSPADM

## 2023-12-14 RX ORDER — LABETALOL HYDROCHLORIDE 5 MG/ML
10 INJECTION, SOLUTION INTRAVENOUS
Status: DISCONTINUED | OUTPATIENT
Start: 2023-12-14 | End: 2023-12-14 | Stop reason: HOSPADM

## 2023-12-14 RX ORDER — ACETAMINOPHEN 500 MG
1000 TABLET ORAL ONCE
Status: COMPLETED | OUTPATIENT
Start: 2023-12-14 | End: 2023-12-14

## 2023-12-14 RX ORDER — TRANEXAMIC ACID 100 MG/ML
1000 INJECTION, SOLUTION INTRAVENOUS 2 TIMES DAILY PRN
Status: DISCONTINUED | OUTPATIENT
Start: 2023-12-14 | End: 2023-12-14 | Stop reason: HOSPADM

## 2023-12-14 RX ORDER — OXYCODONE HYDROCHLORIDE 5 MG/1
10 TABLET ORAL EVERY 4 HOURS PRN
Status: DISCONTINUED | OUTPATIENT
Start: 2023-12-14 | End: 2023-12-16 | Stop reason: HOSPADM

## 2023-12-14 RX ORDER — FUROSEMIDE 20 MG/1
20 TABLET ORAL DAILY
Status: DISCONTINUED | OUTPATIENT
Start: 2023-12-14 | End: 2023-12-16 | Stop reason: HOSPADM

## 2023-12-14 RX ORDER — SODIUM CHLORIDE 9 MG/ML
INJECTION, SOLUTION INTRAVENOUS CONTINUOUS
Status: DISCONTINUED | OUTPATIENT
Start: 2023-12-14 | End: 2023-12-16 | Stop reason: HOSPADM

## 2023-12-14 RX ADMIN — SUGAMMADEX 200 MG: 100 INJECTION, SOLUTION INTRAVENOUS at 11:15

## 2023-12-14 RX ADMIN — HYDROCHLOROTHIAZIDE 12.5 MG: 25 TABLET ORAL at 17:47

## 2023-12-14 RX ADMIN — SODIUM CHLORIDE, POTASSIUM CHLORIDE, SODIUM LACTATE AND CALCIUM CHLORIDE: 600; 310; 30; 20 INJECTION, SOLUTION INTRAVENOUS at 10:16

## 2023-12-14 RX ADMIN — LIDOCAINE HYDROCHLORIDE 60 MG: 20 INJECTION, SOLUTION EPIDURAL; INFILTRATION; INTRACAUDAL; PERINEURAL at 08:49

## 2023-12-14 RX ADMIN — AMLODIPINE BESYLATE 5 MG: 5 TABLET ORAL at 17:47

## 2023-12-14 RX ADMIN — ONDANSETRON HYDROCHLORIDE 4 MG: 2 INJECTION, SOLUTION INTRAMUSCULAR; INTRAVENOUS at 10:59

## 2023-12-14 RX ADMIN — CEFAZOLIN 2000 MG: 2 INJECTION, POWDER, FOR SOLUTION INTRAMUSCULAR; INTRAVENOUS at 08:37

## 2023-12-14 RX ADMIN — SODIUM CHLORIDE, POTASSIUM CHLORIDE, SODIUM LACTATE AND CALCIUM CHLORIDE: 600; 310; 30; 20 INJECTION, SOLUTION INTRAVENOUS at 08:38

## 2023-12-14 RX ADMIN — ACETAMINOPHEN 650 MG: 325 TABLET ORAL at 22:36

## 2023-12-14 RX ADMIN — METOPROLOL SUCCINATE 25 MG: 25 TABLET, EXTENDED RELEASE ORAL at 17:47

## 2023-12-14 RX ADMIN — HYDROMORPHONE HYDROCHLORIDE 0.5 MG: 1 INJECTION, SOLUTION INTRAMUSCULAR; INTRAVENOUS; SUBCUTANEOUS at 17:22

## 2023-12-14 RX ADMIN — FENTANYL CITRATE 25 MCG: 50 INJECTION INTRAMUSCULAR; INTRAVENOUS at 12:45

## 2023-12-14 RX ADMIN — FUROSEMIDE 20 MG: 20 TABLET ORAL at 17:48

## 2023-12-14 RX ADMIN — GABAPENTIN 400 MG: 400 CAPSULE ORAL at 17:47

## 2023-12-14 RX ADMIN — ACETAMINOPHEN 650 MG: 325 TABLET ORAL at 17:48

## 2023-12-14 RX ADMIN — SODIUM CHLORIDE 2000 MG: 900 INJECTION INTRAVENOUS at 17:59

## 2023-12-14 RX ADMIN — PREGABALIN 75 MG: 25 CAPSULE ORAL at 07:29

## 2023-12-14 RX ADMIN — FENTANYL CITRATE 25 MCG: 50 INJECTION INTRAMUSCULAR; INTRAVENOUS at 12:20

## 2023-12-14 RX ADMIN — FENTANYL CITRATE 50 MCG: 50 INJECTION, SOLUTION INTRAMUSCULAR; INTRAVENOUS at 11:55

## 2023-12-14 RX ADMIN — DEXAMETHASONE SODIUM PHOSPHATE 4 MG: 4 INJECTION, SOLUTION INTRAMUSCULAR; INTRAVENOUS at 09:00

## 2023-12-14 RX ADMIN — SODIUM CHLORIDE, POTASSIUM CHLORIDE, SODIUM LACTATE AND CALCIUM CHLORIDE: 600; 310; 30; 20 INJECTION, SOLUTION INTRAVENOUS at 07:26

## 2023-12-14 RX ADMIN — PHENYLEPHRINE HYDROCHLORIDE 100 MCG: 10 INJECTION INTRAVENOUS at 10:58

## 2023-12-14 RX ADMIN — TRANEXAMIC ACID 1000 MG: 100 INJECTION, SOLUTION INTRAVENOUS at 08:59

## 2023-12-14 RX ADMIN — OXYCODONE HYDROCHLORIDE 10 MG: 5 TABLET ORAL at 13:29

## 2023-12-14 RX ADMIN — FAMOTIDINE 20 MG: 10 INJECTION, SOLUTION INTRAVENOUS at 07:44

## 2023-12-14 RX ADMIN — PROPOFOL 150 MG: 10 INJECTION, EMULSION INTRAVENOUS at 08:49

## 2023-12-14 RX ADMIN — LISINOPRIL 20 MG: 20 TABLET ORAL at 17:48

## 2023-12-14 RX ADMIN — TRANEXAMIC ACID 1000 MG: 100 INJECTION, SOLUTION INTRAVENOUS at 10:57

## 2023-12-14 RX ADMIN — Medication 10 ML: at 20:08

## 2023-12-14 RX ADMIN — ROPIVACAINE HYDROCHLORIDE: 5 INJECTION, SOLUTION EPIDURAL; INFILTRATION; PERINEURAL at 10:46

## 2023-12-14 RX ADMIN — ACETAMINOPHEN 1000 MG: 500 TABLET ORAL at 07:29

## 2023-12-14 RX ADMIN — HYDROMORPHONE HYDROCHLORIDE 0.5 MG: 1 INJECTION, SOLUTION INTRAMUSCULAR; INTRAVENOUS; SUBCUTANEOUS at 22:36

## 2023-12-14 RX ADMIN — OXYCODONE 10 MG: 5 TABLET ORAL at 19:19

## 2023-12-14 RX ADMIN — PHENYLEPHRINE HYDROCHLORIDE 100 MCG: 10 INJECTION INTRAVENOUS at 10:14

## 2023-12-14 RX ADMIN — POLYETHYLENE GLYCOL (3350) 17 G: 17 POWDER, FOR SOLUTION ORAL at 17:46

## 2023-12-14 RX ADMIN — DEXAMETHASONE 10 MG: 4 TABLET ORAL at 07:30

## 2023-12-14 RX ADMIN — PHENYLEPHRINE HYDROCHLORIDE 100 MCG: 10 INJECTION INTRAVENOUS at 10:37

## 2023-12-14 RX ADMIN — ROCURONIUM BROMIDE 50 MG: 10 INJECTION, SOLUTION INTRAVENOUS at 08:49

## 2023-12-14 RX ADMIN — SODIUM CHLORIDE: 9 INJECTION, SOLUTION INTRAVENOUS at 17:29

## 2023-12-14 RX ADMIN — ATORVASTATIN CALCIUM 10 MG: 10 TABLET, FILM COATED ORAL at 17:47

## 2023-12-14 RX ADMIN — LEVOTHYROXINE SODIUM 50 MCG: 50 TABLET ORAL at 17:47

## 2023-12-14 ASSESSMENT — PAIN DESCRIPTION - LOCATION
LOCATION: KNEE

## 2023-12-14 ASSESSMENT — PAIN DESCRIPTION - DESCRIPTORS
DESCRIPTORS: ACHING;SHARP
DESCRIPTORS: SHOOTING;PRESSURE
DESCRIPTORS: ACHING

## 2023-12-14 ASSESSMENT — PAIN SCALES - GENERAL
PAINLEVEL_OUTOF10: 8
PAINLEVEL_OUTOF10: 9
PAINLEVEL_OUTOF10: 9
PAINLEVEL_OUTOF10: 6
PAINLEVEL_OUTOF10: 8
PAINLEVEL_OUTOF10: 3
PAINLEVEL_OUTOF10: 7
PAINLEVEL_OUTOF10: 4

## 2023-12-14 ASSESSMENT — PAIN DESCRIPTION - ORIENTATION
ORIENTATION: RIGHT

## 2023-12-14 ASSESSMENT — PAIN DESCRIPTION - PAIN TYPE: TYPE: SURGICAL PAIN

## 2023-12-14 ASSESSMENT — PAIN - FUNCTIONAL ASSESSMENT
PAIN_FUNCTIONAL_ASSESSMENT: 0-10
PAIN_FUNCTIONAL_ASSESSMENT: 0-10
PAIN_FUNCTIONAL_ASSESSMENT: PREVENTS OR INTERFERES SOME ACTIVE ACTIVITIES AND ADLS

## 2023-12-14 ASSESSMENT — PAIN DESCRIPTION - ONSET: ONSET: ON-GOING

## 2023-12-14 ASSESSMENT — LIFESTYLE VARIABLES
HOW MANY STANDARD DRINKS CONTAINING ALCOHOL DO YOU HAVE ON A TYPICAL DAY: PATIENT DOES NOT DRINK
HOW OFTEN DO YOU HAVE A DRINK CONTAINING ALCOHOL: NEVER

## 2023-12-14 ASSESSMENT — PAIN DESCRIPTION - FREQUENCY: FREQUENCY: CONTINUOUS

## 2023-12-14 NOTE — CARE COORDINATION
12/14-left vm with [facility]. Patient's daughter Ramesh Escobar) is a manager at [employer] and gave me the number of [phone] to call.

## 2023-12-14 NOTE — ANESTHESIA POSTPROCEDURE EVALUATION
Department of Anesthesiology  Postprocedure Note    Patient: Elaine Hopkins  MRN: [id number]  YOB: 1945  Date of evaluation: 12/14/2023    Procedure Summary       Date: 12/14/23 Room / Location: [address] [place] [address] / [facility]    Anesthesia Start: 2494 Anesthesia Stop: 0844    Procedure: RIGHT TOTAL KNEE ARTHROPLASTY (Right: Knee) Diagnosis:       Osteoarthritis of right knee, unspecified osteoarthritis type      (Osteoarthritis of right knee, unspecified osteoarthritis type [M17.11])    Surgeons: Barbara Thomas DO Responsible Provider: Jenny Sierra MD    Anesthesia Type: spinal, regional ASA Status: 3            Anesthesia Type: No value filed. Harshad Phase I: Harshad Score: 9    Harshad Phase II:      Anesthesia Post Evaluation    Patient location during evaluation: PACU  Level of consciousness: awake  Airway patency: patent  Nausea & Vomiting: no nausea  Cardiovascular status: blood pressure returned to baseline  Respiratory status: acceptable  Hydration status: euvolemic  Pain management: adequate    No notable events documented.

## 2023-12-14 NOTE — ANESTHESIA PROCEDURE NOTES
Peripheral Block    Patient location during procedure: pre-op  Reason for block: post-op pain management and at surgeon's request  Start time: 12/14/2023 8:06 AM  End time: 12/14/2023 8:11 AM  Staffing  Performed: anesthesiologist   Anesthesiologist: Cheryll Cogan, MD  Performed by: Cheryll Cogan, MD  Authorized by: Cheryll Cogan, MD    Preanesthetic Checklist  Completed: patient identified, IV checked, site marked, risks and benefits discussed, surgical/procedural consents, equipment checked, pre-op evaluation, timeout performed, anesthesia consent given, oxygen available and monitors applied/VS acknowledged  Peripheral Block   Patient position: left lateral decubitus  Prep: ChloraPrep  Provider prep: mask and sterile gloves  Patient monitoring: cardiac monitor, continuous pulse ox, frequent blood pressure checks and IV access  Block type: Femoral and iPacks  Laterality: right  Injection technique: single-shot  Guidance: ultrasound guided  Local infiltration: lidocaine  Infiltration strength: 1 %  Local infiltration: lidocaine  Dose: 3 mL    Needle   Needle type: insulated echogenic nerve stimulator needle   Needle gauge: 21 G  Needle localization: ultrasound guidance  Needle length: 10 cm  Assessment   Injection assessment: negative aspiration for heme, no paresthesia on injection, local visualized surrounding nerve on ultrasound and no intravascular symptoms  Paresthesia pain: none  Slow fractionated injection: yes  Hemodynamics: stable  Real-time US image taken/store: yes  Outcomes: uncomplicated and patient tolerated procedure well    Additional Notes  Immediately prior to procedure a \"time out\" was called to verify the correct patient, allergies, laterality, procedure and equipment. Time out performed with  RN    Local Anesthetic: 0.25 %  Bupivacaine   Amount: 30 ml  in 5 ml increments after negative aspiration each time.     Iliopsoas Muscle and Fascia Iliaca, Femoral artery (Deep artery to
to the thigh take off), Femoral Vein and Femoral Nerve are identified; the tip of the need and the spread of the local anesthetic around the Femoral nerve are visualized. The Femoral nerve appeared to be anatomically normal and there were no abnormal pathologically findings seen.

## 2023-12-14 NOTE — CARE COORDINATION
Case Management Assessment  Initial Evaluation    Date/Time of Evaluation: 12/14/2023 4:12 PM  Assessment Completed by: [place], [place]    If patient is discharged prior to next notation, then this note serves as note for discharge by case management. Patient Name: Morenita Ontiveros                   YOB: 1945  Diagnosis: Osteoarthritis of right knee, unspecified osteoarthritis type [M17.11]  Arthritis of right knee [M17.11]                   Date / Time: 12/14/2023  6:40 AM    Patient Admission Status: Inpatient   Readmission Risk (Low < 19, Mod (19-27), High > 27): Readmission Risk Score: 5.6    Current PCP: Robb Holloway  PCP verified by CM? (P) Yes    Chart Reviewed: Yes      History Provided by: (P) Patient  Patient Orientation: (P) Alert and Oriented, Person, Place, Situation, Self    Patient Cognition: (P) Alert    Hospitalization in the last 30 days (Readmission):  Yes    If yes, Readmission Assessment in CM Navigator will be completed.     Advance Directives:      Code Status: Full Code   Patient's Primary Decision Maker is: (P) Legal Next of Kin      Discharge Planning:    Patient lives with: (P) Alone Type of Home: (P) Other (Comment) (ACSwing Bed)  Primary Care Giver: (P) Self  Patient Support Systems include: (P) Children, Friends/Neighbors   Current Financial resources: (P) None  Current community resources: (P) None  Current services prior to admission: (P) None            Current DME:              Type of Home Care services:  (P) Housekeeping    ADLS  Prior functional level: (P) Assistance with the following:, Mobility, Shopping, Housework  Current functional level: (P) Assistance with the following:, Housework, Shopping, Mobility, Bathing, Dressing    PT AM-PAC:   /24  OT AM-PAC:   /24    Family can provide assistance at DC: (P) Yes  Would you like Case Management to discuss the discharge plan with any other family members/significant others, and if so, who? (P) Yes  Plans to

## 2023-12-14 NOTE — PROGRESS NOTES
Consult has been called to Dr. Carrie Stubbs on 12/14/23. Spoke with dr Carrie Stubbs .  3:46 PM    Vanessa Canas  12/14/2023

## 2023-12-14 NOTE — DISCHARGE INSTRUCTIONS
prescription medication with anyone! Sharing is illegal.  The prescription dose is based on your age, weight, and health problems. Sharing your narcotic prescription can lead to accidental death of the individual for which the prescription was not prescribed. You may not know about his/her addiction problem. [x] Always use the same pharmacy when filling your narcotic prescriptions. [x] DO NOT mix your narcotic prescription with alcohol. Mixing the narcotic prescription medication with alcohol causes depressive effects including breathing problems, organ malfunction, and cardiac arrest.     [x] Always keep your narcotic medication in a locked, secured location. Keep your medication private. This is to avoid individuals from taking your medication without your knowledge. This medication is highly sought after and locking your prescriptions will protect you from being a target of crime. [x] DO NOT stock pile your medication. This also will protect you from being a target of crime. [x] Dispose of any unused medications properly. Do not flush the medications. Look for appropriate waste collection programs in your community and drug take back events. [x] DO NOT drive while taking narcotic pain medication or muscle relaxers. Reminder:    The phases following a total knee replacement that most patients often encounter. Briefly:    First phase: First phase is The Pain Phase. There is plenty of pain medication (and pharmacy can be consulted if needed). TKR is a painful operation however pain management has improved significantly. Second phase: The second phase is Not Sleeping Phase. It is common for total knee replacement patients not to sleep well after total knee replacement. This can go on for several months. Third phase: The third phase is Depression.  The \"not sleeping at night phase\" leads to the third phase in which patients often experience depression/the blues, feeling Final Check - New x-rays. Annually:  Followup annually thereafter - New x-rays

## 2023-12-14 NOTE — BRIEF OP NOTE
Brief Postoperative Note      Patient: Morenita Ontiveros  YOB: 1945  MRN: [id number]    Date of Procedure: 12/14/2023    Pre-Op Diagnosis Codes:     * Osteoarthritis of right knee, unspecified osteoarthritis type [M17.11]    Post-Op Diagnosis: Same       Procedure(s):  RIGHT TOTAL KNEE ARTHROPLASTY - CORI    Surgeon(s):  China Sauceda DO    Assistant:  Surgical Assistant: Dr. Brianne Swain PA-C    Anesthesia: General regional and local    Estimated Blood Loss (mL): 300     IVF: 1400cc crystalloids    TT: 98SME    Complications: None    Specimens:   * No specimens in log *    Implants:  Implant Name Type Inv. Item Serial No.  Lot No. LRB No. Used Action   CEMENT BNE 20ML 41GM FULL DOSE PMMA W/ TOBRA M VISC RADPQ - [id number]  CEMENT BNE 20ML 41GM FULL DOSE PMMA W/ TOBRA M VISC RADPQ  [employer] [facility]- [id number] Right 1 Implanted   CEMENT BNE 20ML 41GM FULL DOSE PMMA W/ TOBRA M VISC RADPQ - [id number]  CEMENT BNE 20ML 41GM FULL DOSE PMMA W/ TOBRA M VISC RADPQ  [employer] [facility]- [id number] Right 1 Implanted   COMPONENT PAT [id number] THK7. 5MM RESURFACE RND JOURNEY - [id number]  COMPONENT PAT [id number] THK7. 5MM RESURFACE RND [facility] [employer]- [id number] Right 1 Implanted   COMPONENT FEM SZ 5 R KNEE OXINIUM BI CRUCE STBL JOURNEY II - [id number]  COMPONENT FEM SZ 5 R KNEE OXINIUM BI CRUCE STBL [facility] [employer]- [id number] Right 1 Implanted   BASEPLATE TIB SZ 4 [id number] ML71MM R KNEE NP GIL JOURNEY - [id number]  BASEPLATE TIB SZ 4 [id number] ML71MM R KNEE NP GIL [facility] [employer]- [id number] Right 1 Implanted   INSERT TIB SZ 3-4 [id number] AP48MM ML68MM R ARTC KNEE XLPE BI - [id number]  INSERT TIB SZ 3-4 [id number] AP48MM ML68MM R ARTC KNEE XLPE BI  SIMS AND NEPHEW Karolyn Sorensen [id number] Right 1 Implanted   Implants: Darin Kumar and chen bales 2 BCS, 5 femur, 4 tibia, 11 poly PS, 35 (7.5) patella        Findings: see op note      Electronically signed by Desirae Briseno DO on 12/14/2023 at 10:54 AM

## 2023-12-14 NOTE — OP NOTE
The patient is an 78F with worsening  R knee tricompartmental osteoarthritis. The patient was  trialed with extensive nonoperative management and failed conservative  care. The pain in the knee is affecting their activities of daily  living. Operative indications were met for total knee arthroplasty. OPERATIVE PROCEDURE:  The patient was met in the preoperative area where  the appropriate surgical site was marked. Written consent was obtained  after discussing the risks, benefits, and alternative treatment options  with the patient in detail. The patient agreed to move forward with the  proposed procedure. The patient was moved back to the operative suite  and placed on the operating table in supine position. General  anesthesia was induced by the Anesthesia team without complication. Preop adductor canal and IPACC block performed. A  nonsterile tourniquet was applied to the operative extremity. The  operative extremity was then prepped and draped in normal sterile  fashion. A time-out was performed with all necessary parties present in the room. 2g ancef and 1g TXA given before incision. A midline incision was made over the anterior aspect of the knee with a  10-blade scalpel. This was taken in line with the patella just medial  to the tibial tubercle. Sharp dissection was taken down to the quad  tendon and the patellar retinaculum. The patellar tendon was  identified. The VMO muscle was identified. Any bleeding was cauterized  with Bovie cautery. A medial parapatellar arthrotomy was then performed  with a 10-blade knife and then Bovie. The patella was everted  laterally. The fat pad was excised with care to  protect the patellar tendon. A medial peel was performed with Bovie  cautery subperiosteally to the mid coronal aspect of the knee. Alice [place]  was utilized to finish the median release along the medial border of the  tibial joint.   Anterior aspects of both the lateral and medial procedure which requires 2 assistants. Daniel's assistance was necessary for exposure, retraction, robtoic navigation input, placement of implants and cement, irrigation of the wound, removal of cement extravasation, wound closure, and other assistant duties that were necessary to complete the procedure.       Electronically signed by Yvonne Campos DO on 12/14/2023 at 10:55 AM

## 2023-12-15 PROBLEM — I25.10 CORONARY ARTERIOSCLEROSIS: Status: ACTIVE | Noted: 2023-12-15

## 2023-12-15 PROBLEM — I51.89 DIASTOLIC DYSFUNCTION: Status: ACTIVE | Noted: 2023-12-15

## 2023-12-15 PROBLEM — Z96.651 H/O TOTAL KNEE REPLACEMENT, RIGHT: Status: ACTIVE | Noted: 2023-12-15

## 2023-12-15 LAB
ANION GAP SERPL CALCULATED.3IONS-SCNC: 8 MMOL/L (ref 3–16)
BUN SERPL-MCNC: 17 MG/DL (ref 7–20)
CALCIUM SERPL-MCNC: 8.4 MG/DL (ref 8.3–10.6)
CHLORIDE SERPL-SCNC: 104 MMOL/L (ref 99–110)
CO2 SERPL-SCNC: 25 MMOL/L (ref 21–32)
CREAT SERPL-MCNC: 0.6 MG/DL (ref 0.6–1.2)
GFR SERPLBLD CREATININE-BSD FMLA CKD-EPI: >60 ML/MIN/{1.73_M2}
GLUCOSE SERPL-MCNC: 131 MG/DL (ref 70–99)
HCT VFR BLD AUTO: 33.4 % (ref 36–48)
HGB BLD-MCNC: 11.2 G/DL (ref 12–16)
POTASSIUM SERPL-SCNC: 3.7 MMOL/L (ref 3.5–5.1)
SODIUM SERPL-SCNC: 137 MMOL/L (ref 136–145)

## 2023-12-15 PROCEDURE — 97530 THERAPEUTIC ACTIVITIES: CPT

## 2023-12-15 PROCEDURE — 80048 BASIC METABOLIC PNL TOTAL CA: CPT

## 2023-12-15 PROCEDURE — 97110 THERAPEUTIC EXERCISES: CPT

## 2023-12-15 PROCEDURE — 6370000000 HC RX 637 (ALT 250 FOR IP): Performed by: STUDENT IN AN ORGANIZED HEALTH CARE EDUCATION/TRAINING PROGRAM

## 2023-12-15 PROCEDURE — 85018 HEMOGLOBIN: CPT

## 2023-12-15 PROCEDURE — 6360000002 HC RX W HCPCS: Performed by: STUDENT IN AN ORGANIZED HEALTH CARE EDUCATION/TRAINING PROGRAM

## 2023-12-15 PROCEDURE — 97535 SELF CARE MNGMENT TRAINING: CPT

## 2023-12-15 PROCEDURE — 1200000000 HC SEMI PRIVATE

## 2023-12-15 PROCEDURE — 2580000003 HC RX 258: Performed by: NURSE PRACTITIONER

## 2023-12-15 PROCEDURE — 36415 COLL VENOUS BLD VENIPUNCTURE: CPT

## 2023-12-15 PROCEDURE — 97161 PT EVAL LOW COMPLEX 20 MIN: CPT

## 2023-12-15 PROCEDURE — 97116 GAIT TRAINING THERAPY: CPT

## 2023-12-15 PROCEDURE — 2580000003 HC RX 258: Performed by: STUDENT IN AN ORGANIZED HEALTH CARE EDUCATION/TRAINING PROGRAM

## 2023-12-15 PROCEDURE — 97166 OT EVAL MOD COMPLEX 45 MIN: CPT

## 2023-12-15 PROCEDURE — 85014 HEMATOCRIT: CPT

## 2023-12-15 RX ORDER — 0.9 % SODIUM CHLORIDE 0.9 %
500 INTRAVENOUS SOLUTION INTRAVENOUS ONCE
Status: COMPLETED | OUTPATIENT
Start: 2023-12-15 | End: 2023-12-15

## 2023-12-15 RX ORDER — OXYCODONE HYDROCHLORIDE 5 MG/1
5-10 TABLET ORAL
Qty: 20 TABLET | Refills: 0 | Status: SHIPPED | OUTPATIENT
Start: 2023-12-15 | End: 2023-12-22

## 2023-12-15 RX ORDER — METOPROLOL SUCCINATE 25 MG/1
25 TABLET, EXTENDED RELEASE ORAL DAILY
Status: DISCONTINUED | OUTPATIENT
Start: 2023-12-16 | End: 2023-12-16 | Stop reason: HOSPADM

## 2023-12-15 RX ORDER — POLYETHYLENE GLYCOL 3350 17 G/17G
17 POWDER, FOR SOLUTION ORAL DAILY PRN
Qty: 30 PACKET | Refills: 0
Start: 2023-12-15 | End: 2024-01-14

## 2023-12-15 RX ORDER — ASPIRIN 81 MG/1
81 TABLET ORAL 2 TIMES DAILY
Qty: 60 TABLET | Refills: 0
Start: 2023-12-15 | End: 2024-01-14

## 2023-12-15 RX ADMIN — OXYCODONE HYDROCHLORIDE 5 MG: 5 TABLET ORAL at 12:57

## 2023-12-15 RX ADMIN — ASPIRIN 81 MG: 81 TABLET, COATED ORAL at 20:32

## 2023-12-15 RX ADMIN — ATORVASTATIN CALCIUM 10 MG: 10 TABLET, FILM COATED ORAL at 12:57

## 2023-12-15 RX ADMIN — ASPIRIN 81 MG: 81 TABLET, COATED ORAL at 12:57

## 2023-12-15 RX ADMIN — ACETAMINOPHEN 650 MG: 325 TABLET ORAL at 05:03

## 2023-12-15 RX ADMIN — ACETAMINOPHEN 650 MG: 325 TABLET ORAL at 16:56

## 2023-12-15 RX ADMIN — LEVOTHYROXINE SODIUM 50 MCG: 50 TABLET ORAL at 05:04

## 2023-12-15 RX ADMIN — SODIUM CHLORIDE 2000 MG: 900 INJECTION INTRAVENOUS at 00:22

## 2023-12-15 RX ADMIN — SODIUM CHLORIDE 500 ML: 9 INJECTION, SOLUTION INTRAVENOUS at 10:16

## 2023-12-15 RX ADMIN — GABAPENTIN 400 MG: 400 CAPSULE ORAL at 16:56

## 2023-12-15 RX ADMIN — GABAPENTIN 400 MG: 400 CAPSULE ORAL at 20:32

## 2023-12-15 RX ADMIN — OXYCODONE HYDROCHLORIDE 5 MG: 5 TABLET ORAL at 06:09

## 2023-12-15 RX ADMIN — ACETAMINOPHEN 650 MG: 325 TABLET ORAL at 20:32

## 2023-12-15 RX ADMIN — OXYCODONE 10 MG: 5 TABLET ORAL at 20:02

## 2023-12-15 RX ADMIN — POLYETHYLENE GLYCOL (3350) 17 G: 17 POWDER, FOR SOLUTION ORAL at 13:00

## 2023-12-15 ASSESSMENT — PAIN SCALES - GENERAL
PAINLEVEL_OUTOF10: 5
PAINLEVEL_OUTOF10: 5
PAINLEVEL_OUTOF10: 4
PAINLEVEL_OUTOF10: 5
PAINLEVEL_OUTOF10: 5
PAINLEVEL_OUTOF10: 9
PAINLEVEL_OUTOF10: 5
PAINLEVEL_OUTOF10: 2
PAINLEVEL_OUTOF10: 2

## 2023-12-15 ASSESSMENT — PAIN DESCRIPTION - LOCATION
LOCATION: KNEE

## 2023-12-15 ASSESSMENT — PAIN DESCRIPTION - DESCRIPTORS
DESCRIPTORS: ACHING
DESCRIPTORS: ACHING
DESCRIPTORS: PRESSURE
DESCRIPTORS: ACHING;DISCOMFORT

## 2023-12-15 ASSESSMENT — PAIN DESCRIPTION - ORIENTATION
ORIENTATION: RIGHT

## 2023-12-15 ASSESSMENT — PAIN DESCRIPTION - FREQUENCY
FREQUENCY: CONTINUOUS
FREQUENCY: CONTINUOUS

## 2023-12-15 ASSESSMENT — PAIN DESCRIPTION - PAIN TYPE
TYPE: SURGICAL PAIN
TYPE: SURGICAL PAIN

## 2023-12-15 ASSESSMENT — PAIN DESCRIPTION - ONSET
ONSET: AWAKENED FROM SLEEP
ONSET: ON-GOING

## 2023-12-15 ASSESSMENT — PAIN - FUNCTIONAL ASSESSMENT
PAIN_FUNCTIONAL_ASSESSMENT: ACTIVITIES ARE NOT PREVENTED
PAIN_FUNCTIONAL_ASSESSMENT: PREVENTS OR INTERFERES SOME ACTIVE ACTIVITIES AND ADLS

## 2023-12-15 NOTE — PROGRESS NOTES
Pt resting. Resp e/e. Initial assessment completed and charted. No needs. Will monitor.  Melly Duran RN

## 2023-12-15 NOTE — CARE COORDINATION
Emailed updated OT/PT notes to [employer] at [facility] bed. She will follow up after reviewing. Emailed: H&P, meds list, progress notes and OT notes again    [employer] called and said that Marilee Curry is accepted. Darryle Mylar Dr and requested d/c.  MICHAEL is completed.  Nurse has been notified

## 2023-12-15 NOTE — PROGRESS NOTES
Laundry , and grocery shopping  Pt. independent for functional transfers and utilized [facility] for mobility in home and [facility] out in community  History of falls:    Yes  Home Health Services:  None    Objective:  Does this pt have an acute or acute on chronic diagnosis of CHF? No    Upper Extremity ROM:    Appears WFL's    Dominant Hand: Right    Upper Extremity Strength:    WFL    Upper Extremity Sensation:    WFL    Upper Extremity Proprioception:  WFL    Coordination:  WFL    Tone:  WFL    Balance:  Sitting:    Supervision  Standing:    CGA    Bed Mobility:   Supine to Sit:    Min A   Sit to Supine:   Not Tested  Rolling:   Not Tested  Scooting in sitting: SBA  Scooting in supine:  Not Tested    Transfers:    Sit to stand:    CGA  Stand to sit:    Min A to standard commode  Bed to chair:     CGA and With RW and gait belt  Bed/ chair to standard toilet:  CGA  Bed/chair to BSC:   Not Tested  Functional Mobility:   CGA and With RW and gait belt    See PT note for gait analysis. ADLs:  Dressing:      UE:   Not Tested  LE:    Mod A     Bathing:    UE:  Not Tested  LE:  Not Tested    Eating:   Independent    Toileting: Mod A  to don pull UPs    Grooming/hygiene: Not Tested    Activity Tolerance:   Pt completed therapy session with pain     BP (mmHg) HR (bpm) SpO2 (%) on room air  Comments   Supine at rest       Seated at EOB       Standing       End of session         Positioning Needs:   Pt up in chair, no alarm needed, call light provided and all needs within reach . Ther Ex / Activities Initiated:   N/A      Patient/Family Education:   Pt educated on role of inpatient OT, plan of care, importance of continued activity, Functional transfer/mobility safety, Transfer techniques, and Calling for assist with mobility. CHF Education  N/A    Assessment:  Pt seen for Occupational therapy evaluation in acute care setting.   Pt demonstrated decreased Activity tolerance, ADLs, Balance , Bed mobility, Safety Awareness, and

## 2023-12-15 NOTE — PROGRESS NOTES
flight of 12-13 with railing  Laundry:                                              1st floor  Bathroom:                                           walk in shower, shower chair , and comfort height toilet  Pt sleeps in a:                                     Flat bed- railing on bed   Equipment owned:                              RW, SPC, BSC, shower chair/bench, lift chair, and reacher     Preadmission Status:  Pt. Able to drive:                                 Yes  Pt. Fully independent with ADLs:       Yes  Pt. Required assistance for:               Cleaning, Cooking, Laundry , and grocery shopping  Pt. independent for functional transfers and utilized [facility] for mobility in home and [facility] out in [facility]  History of falls:                                                Yes  Home Health Services:                       None    Objective  Does this pt have an acute or acute on chronic diagnosis of CHF? No    Upper Extremity ROM/Strength  Please see OT evaluation. Lower Extremity ROM / Strength   AROM WFL: No  ROM limitations: R knee flexion AROM ~ 60 degrees and lacking ~ 10 degrees of extension    Strength Assessment (measured on a 0-5 scale):  R LE   Quad   3   Ant Tib  4-   Hamstring 3-   Iliopsoas 3-  L LE  Quad   WFL   Ant Tib  WFL   Hamstring WFL   Iliopsoas WFL    Lower Extremity Sensation    Impaired neuropathy in B feet    Coordination  Impaired    Tone  Not Tested    Balance  Static Sitting:  Good ; Supervision  Dynamic Sitting:  Good ; Supervision   Comments: EOB and on commode    Static Standing: Fair +; CGA  Dynamic Standing: Fair +; CGA  Comments: with RW and gait belt    Posture  Seated: Forward head and neck and Thoracic kyphosis  Standing:  Forward head and neck, Thoracic kyphosis, and Forward flexed at hips/trunk    Bed Mobility   Supine to Sit:    Min A  HOB elevated, assistance for R LE  Sit to Supine:   Not Tested  Rolling:   Not Tested   Scooting in sitting: CGA   Scooting in supine:  Not Tested Bridging:  Not Tested    Transfer Training     Sit to stand from EOB:   CGA   Stand to sit to commode:   Min A    Sit to stand from commode: Mod A   Bed to/from Chair:  CGA with use of gait belt and rolling walker (RW)    Gait gait completed as indicated below  Distance:      12 ft to restroom then 20 ft to recliner  Deviations (firm surface/linoleum):  decreased david, increased ELIDA, step to pattern, forward flexed posture, antalgic pattern, decreased foot clearance on right, decreased step length on left, and decreased stance time on right  Assistive Device Used:    gait belt and rolling walker (RW)  Level of Assist:    CGA   Comment: max VC for upright posture with KI    Trial 2:  Distance:      20 ft  Deviations (firm surface/linoleum):  decreased david, increased ELIDA, step to pattern, forward flexed posture, antalgic pattern, decreased foot clearance on right, decreased step length on left, and decreased stance time on right  Assistive Device Used:    gait belt and rolling walker (RW)  Level of Assist:    CGA  Comment: improved R foot clearance when KI doffed, but noted R knee flexion throughout stance phase. Pt educated on importance of knee extension during ambulation in addition to upright posture.         Stair Training deferred, pt unsafe/ not appropriate to complete stairs at this time  # of Steps:   N/A  Level of Assist:  Not Tested   UE Support:  NA  Assistive Device:  N/A  Pattern:   N/A  Comments:      Therapeutic Exercises Initiated  all completed bilaterally unless indicated  Supine:  N/A    Seated:  Reviewed TKA HEP and completed 1- repetitions of each exercise    Standing:  N/A    Activity Tolerance   During therapy session noted pt with no adverse symptoms to activity    Pt Position BP (mmHg) HR (bpm) SpO2 (%) on   Comments   Supine at rest       Seated at EOB       Standing       End of session         Positioning Needs   Pt up in chair and reclined in chair, alarm set, positioned in

## 2023-12-15 NOTE — PLAN OF CARE
Problem: Discharge Planning  Goal: Discharge to home or other facility with appropriate resources  Outcome: Progressing  Flowsheets  Taken 12/14/2023 2010 by Britta Casper RN  Discharge to home or other facility with appropriate resources: Identify barriers to discharge with patient and caregiver  Taken 12/14/2023 1702 by Andre Arteaga RN  Discharge to home or other facility with appropriate resources: Refer to discharge planning if patient needs post-hospital services based on physician order or complex needs related to functional status, cognitive ability or social support system     Problem: Pain  Goal: Verbalizes/displays adequate comfort level or baseline comfort level  Outcome: Progressing  Flowsheets (Taken 12/14/2023 1919)  Verbalizes/displays adequate comfort level or baseline comfort level: Encourage patient to monitor pain and request assistance     Problem: Safety - Adult  Goal: Free from fall injury  Outcome: Progressing     Problem: ABCDS Injury Assessment  Goal: Absence of physical injury  Outcome: Progressing     Problem: Skin/Tissue Integrity - Adult  Goal: Skin integrity remains intact  Outcome: Progressing     Problem: Musculoskeletal - Adult  Goal: Return mobility to safest level of function  Outcome: Progressing     Problem: Genitourinary - Adult  Goal: Absence of urinary retention  Outcome: Progressing

## 2023-12-15 NOTE — CONSULTS
Hospital Medicine Consult Note    PCP: Meliza Ramsay    Date of Admission: 12/14/2023    Date of Service: Pt seen/examined on 12/15/23     Reason for consultation: medical management       History Of Present Illness: The patient is a 78 y.o. female with PMH of CAD, s/p remote PCI, HTN, HLD, DM, hypothryoidism who is admitted under the ortho service for s/p right knee replacement and IM consulted for medical management. Pt sitting up in chair, no current complaints. She did have episode of hypotension this am.  Stated she was asymptomatic. Received 500 ml bolus and blood pressure stable. +pain to right knee. Denies fever, cough, chest pain, dyspnea, abdominal pain, nausea, vomiting, or diarrhea. Past Medical History:        Diagnosis Date    Anesthesia     PONV    Arthritis     Hyperlipidemia     Hypertension     Lumbar herniated disc     Lumbar spondylosis     Lumbar stenosis     L3 and L4    Prediabetes     Radiculopathy of lumbar region     Right L4    Scoliosis     Thyroid disease     hypothyroid       Past Surgical History:        Procedure Laterality Date    BLADDER REPAIR      CHOLECYSTECTOMY      COLONOSCOPY      CORONARY ANGIOPLASTY      HYSTERECTOMY (CERVIX STATUS UNKNOWN)      LUMBAR DISCECTOMY  08/22/2011    microlumber L4-5 left. TOTAL KNEE ARTHROPLASTY Right 12/14/2023    TOTAL KNEE ARTHROPLASTY Right 12/14/2023    RIGHT TOTAL KNEE ARTHROPLASTY performed by Regine Lopez DO at [facility] OR       Medications Prior to Admission:    Prior to Admission medications    Medication Sig Start Date End Date Taking?  Authorizing Provider   aspirin 81 MG EC tablet Take 1 tablet by mouth in the morning and at bedtime 12/15/23 1/14/24 Yes CLAY Salcido   polyethylene glycol (GLYCOLAX) 17 g packet Take 1 packet by mouth daily as needed for Constipation 12/15/23 1/14/24 Yes CLAY Salcido   oxyCODONE (ROXICODONE) 5 MG immediate release tablet Take 1-2 tablets by mouth every 4-6 hours as Final Result   Postop changes as above                  ASSESSMENT/PLAN:    S/p Right Total Knee Arthroplasty  - ortho managing  - DVT prophelaxysis per ortho  - pain control  - PT/OT      HTN with hypotension   - blood pressure low this am, pt asymptomatic   - given 500 bolus this am  - Holding Norvasc, Lisinopril/HCTZ and Lasix  - holding parameters placed on Toprol XL  - monitor blood pressure and resume when able     Anemia  - likely post op related vs possibly hemodilutional due to IVF during surgery  - hemoglobin on admission 13.2  - hemoglobin today 11.2  - no s/sx of bleeding  - repeat in am      CAD  S/p remote PCI  - continue aspirin, statin  - BB as tolerated   - denies any chest pain     DCHF  - appears compensated  - holding Lasix  - monitor closely with  IVF  - daily weights, low sodium diet, strict I/O    Neuropathy  - continue Neurontin     Hypothyroidism  - home dose of synthroid 50 mcg       HLD  - Continue statin      Plan of care discussed with Dr. Yareli Galvan, APRN - CNP

## 2023-12-15 NOTE — PROGRESS NOTES
Inpatient Physical Therapy Evaluation & Treatment    Unit: [facility]  Date:  12/15/2023  Patient Name:    Saravanan Wyatt  Admitting diagnosis:  Osteoarthritis of right knee, unspecified osteoarthritis type [M17.11]  Arthritis of right knee [M17.11]  Admit Date:  12/14/2023  Precautions/Restrictions/WB Status/ Lines/ Wounds/ Oxygen: Fall risk, Bed/chair alarm, and Lines (external catheter)      Pt seen for cotreatment this date due to patient endurance, acute illness/injury, and limited functional status information    Treatment Time:  1520- 1548  Treatment Number:  2   Timed Code Treatment Minutes: 28 minutes  Total Treatment Minutes:  28  minutes    Patient Stated Goals for Therapy: \" to get home \"          Discharge Recommendations: SNF  DME needs for discharge: Defer to facility       Therapy recommendation for EMS Transport: can transport by wheelchair    Therapy recommendations for staff:   Assist of 1 for ambulation with use of rolling walker (RW) and gait belt to/from chair  to/from bathroom    History of Present Illness:   Per Dr. Guillermo Maza H&P:    The patient is a 78 y.o. female with R knee pain due to osteoarthritis. 12/14/23  Procedure: RIGHT TOTAL KNEE ARTHROPLASTY (Right: Knee)     Home Health S4 Level Recommendation:  NA        AM-PAC Mobility Score       AM-PAC Inpatient Mobility without Stair Climbing Raw Score : 15      Subjective  Patient lying reclined in bed with no family present. Pt agreeable to this PT session.      Cognition    A&O x4   Able to follow 2 step commands    Pain   Yes  Location: R knee  Rating: mild /10  Pain Medicine Status: Received pain med prior to tx    Preadmission Environment:   Pt. Lives                                              Alone  Home environment:                              3 story  one step into the bathroom - family is to build ramp - pt stays on the first floor   Steps to enter first floor:                     Ramp  Steps to second floor/basement: baseline, demonstrates good rehab potential and unable to return home due to limited or no family support, inability to negotiate stairs to enter home/bedroom/bathroom, burden of care beyond caregiver ability, home environment not conducive to patient recovery, and limited safety awareness. Goals : all goals ongoing   To be met in 3 visits:  1). Independent with LE Ex x 10 reps  2). Sit to/from stand: SBA  3). Bed to chair: SBA      To be met in 6 visits:  1). Supine to/from sit: Independent  2). Sit to/from stand: Independent  3). Bed to chair: Independent  4). Gait: Ambulate 150 ft.  with Independent and use of LRAD (least restrictive assistive device)  5). Tolerate B LE exercises 3 sets of 10-15 reps  6). Ascend/descend 1 steps with Independent with use of no handrail and LRAD (least restrictive assistive device)    Rehabilitation Potential: Fair  Strengths for achieving goals include:   Pt motivated, PLOF, Family Support, and Pt cooperative   Barriers to achieving goals include:    Complexity of condition, Pain, and Weakness    Plan    To be seen 5-7 x / week BID (ortho) while in acute care setting for therapeutic exercises, bed mobility, transfers, progressive gait training, balance training, and family/patient education. Signature: Alfred Blount, PT     If patient discharges from this facility prior to next visit, this note will serve as the Discharge Summary.     CHF Education  N/A

## 2023-12-15 NOTE — DISCHARGE INSTR - COC
Continuity of Care Form    Patient Name: Zheng Erickson   :  [id number]  MRN:  [id number]    Admit date:  2023  Discharge date:  12/15/2023    Code Status Order: Full Code   Advance Directives:   [address] Documentation       Date/Time Healthcare Directive Type of Healthcare Directive Copy in [address] Agent's Name Healthcare Agent's Phone Number    [phone] No, patient does not have an advance directive for healthcare treatment -- -- -- -- --            Admitting Physician:  Dorisann Gosselin, DO  PCP: Annita Lea    Discharging Nurse: [phone]  [facility] Unit/Room#: 6515/9560-63  Discharging Unit Phone Number: AW    Emergency Contact:   Extended Emergency Contact Information  Primary Emergency Contact: [address] Phone: [phone]  Relation: Child    Past Surgical History:  Past Surgical History:   Procedure Laterality Date    BLADDER REPAIR      CHOLECYSTECTOMY      COLONOSCOPY      CORONARY ANGIOPLASTY      HYSTERECTOMY (CERVIX STATUS UNKNOWN)      LUMBAR DISCECTOMY  2011    microlumber L4-5 left.     TOTAL KNEE ARTHROPLASTY Right 2023    TOTAL KNEE ARTHROPLASTY Right 2023    RIGHT TOTAL KNEE ARTHROPLASTY performed by Dorisann Gosselin, DO at [facility] OR       Immunization History:   Immunization History   Administered Date(s) Administered    COVID-19, PFIZER PURPLE top, DILUTE for use, (age 15 y+), 30mcg/0.3mL 2021, 2021    COVID-19, PFIZER, (- formula), (age 12y+), IM, 30mcg/0.3mL 2023       Active Problems:  Patient Active Problem List   Diagnosis Code    DDD (degenerative disc disease), lumbar M51.36    Primary osteoarthritis of right knee M17.11    Right knee pain M25.561    Arthritis of right knee M17.11       Isolation/Infection:   Isolation            No Isolation          Patient Infection Status       None to display            Nurse Assessment:  Last Vital Signs: BP (!) 83/47 therapy. Ventilator:    - No ventilator support    Rehab Therapies: Physical Therapy and Occupational Therapy  Weight Bearing Status/Restrictions: No weight bearing restrictions  Other Medical Equipment (for information only, NOT a DME order):  walker and hospital bed  Other Treatments: PT/OT    Patient's personal belongings (please select all that are sent with patient):  Glasses    RN SIGNATURE:  Electronically signed by Viral Burgos RN on 81/49/76 at 11:09 AM EST    CASE MANAGEMENT/SOCIAL WORK SECTION    Inpatient Status Date: 12/14/2023    Readmission Risk Assessment Score:  Readmission Risk              Risk of Unplanned Readmission:  9           Discharging to Facility/ Agency   Name: [facility]  Address: [facility] [address] [place], [place], [facility]  Phone: [phone] RN Report # [phone]  Fax: [phone] FAX AVS to : [phone]    Dialysis Facility (if applicable)   Name:  Address:  Dialysis Schedule:  Phone:  Fax:    / signature: Electronically signed by Pauleen Dance, LSW on 12/15/23 at 4:23 PM EST    PHYSICIAN SECTION    Prognosis: Good    Condition at Discharge: Stable    Rehab Potential (if transferring to Rehab): Good    Recommended Labs or Other Treatments After Discharge:     Physician Certification: I certify the above information and transfer of Art Chairez  is necessary for the continuing treatment of the diagnosis listed and that she requires [address] for less 30 days.      Update Admission H&P: No change in H&P    PHYSICIAN SIGNATURE:  Electronically signed by CLAY Umanzor on 12/15/23 at 11:06 AM EST

## 2023-12-15 NOTE — CARE COORDINATION
DISCHARGE ORDER  Date/Time 12/15/2023 4:55 PM  Completed by: Trixie Garcia, RN, Case Management    Patient Name: Lisette Villela    : 1945      Admit order Date and Status: IP 2023  Noted discharge order. (verify MD's last order for status of admission/Traditional Medicare 3 [place] Inpatient qualifying stay required for SNF)    Confirmed discharge plan with:              Patient:  Yes                                  Discharge to Facility:   Name: [facility]  Address: [facility] [address], [place], [facility]  Phone: [phone]  Fax: [phone]      Facility phone number for staff giving report:   [phone]   Pre-certification completed: [facility] Exemption Notification (HENS) completed: NA   Discharge orders and Continuity of Care faxed to facility:        Transportation:               Medical Transport explained with choice list offered to pt/family. Choice: Per Roundtrip (no preference)  Agency used: [employer]   time: 19:00      Pt/family/Nursing/Facility aware of  time:   Patient/family per Farzana Mcneal RN  [facility] RN  [place]  Ambulance form completed:  YES     Date Last IMM Given: 12/15    Comments:    Pt is being d/c'd to [facility] unit today. Pt's O2 sats are 94% on RA. Discharge timeout done with Ranjan Montoya. All discharge needs and concerns addressed. Discharging nurse to complete MICHAEL, reconcile AVS, and place final copy with patient's discharge packet. Discharging RN to ensure that written prescriptions for  Level II medications are sent with patient to the facility as per protocol.

## 2023-12-16 VITALS
TEMPERATURE: 97.9 F | DIASTOLIC BLOOD PRESSURE: 68 MMHG | BODY MASS INDEX: 31.58 KG/M2 | HEART RATE: 79 BPM | WEIGHT: 185 LBS | HEIGHT: 64 IN | OXYGEN SATURATION: 96 % | RESPIRATION RATE: 16 BRPM | SYSTOLIC BLOOD PRESSURE: 118 MMHG

## 2023-12-16 LAB
BASOPHILS # BLD: 0 K/UL (ref 0–0.2)
BASOPHILS NFR BLD: 0.1 %
DEPRECATED RDW RBC AUTO: 13 % (ref 12.4–15.4)
EOSINOPHIL # BLD: 0 K/UL (ref 0–0.6)
EOSINOPHIL NFR BLD: 0.1 %
HCT VFR BLD AUTO: 30.6 % (ref 36–48)
HGB BLD-MCNC: 10.2 G/DL (ref 12–16)
LYMPHOCYTES # BLD: 2.4 K/UL (ref 1–5.1)
LYMPHOCYTES NFR BLD: 23.2 %
MCH RBC QN AUTO: 32.3 PG (ref 26–34)
MCHC RBC AUTO-ENTMCNC: 33.4 G/DL (ref 31–36)
MCV RBC AUTO: 96.7 FL (ref 80–100)
MONOCYTES # BLD: 0.7 K/UL (ref 0–1.3)
MONOCYTES NFR BLD: 7.3 %
NEUTROPHILS # BLD: 7.1 K/UL (ref 1.7–7.7)
NEUTROPHILS NFR BLD: 69.3 %
PLATELET # BLD AUTO: 174 K/UL (ref 135–450)
PMV BLD AUTO: 8.9 FL (ref 5–10.5)
RBC # BLD AUTO: 3.16 M/UL (ref 4–5.2)
WBC # BLD AUTO: 10.2 K/UL (ref 4–11)

## 2023-12-16 PROCEDURE — 97110 THERAPEUTIC EXERCISES: CPT

## 2023-12-16 PROCEDURE — 6370000000 HC RX 637 (ALT 250 FOR IP): Performed by: STUDENT IN AN ORGANIZED HEALTH CARE EDUCATION/TRAINING PROGRAM

## 2023-12-16 PROCEDURE — 6370000000 HC RX 637 (ALT 250 FOR IP): Performed by: NURSE PRACTITIONER

## 2023-12-16 PROCEDURE — 85025 COMPLETE CBC W/AUTO DIFF WBC: CPT

## 2023-12-16 PROCEDURE — 36415 COLL VENOUS BLD VENIPUNCTURE: CPT

## 2023-12-16 PROCEDURE — 6360000002 HC RX W HCPCS: Performed by: STUDENT IN AN ORGANIZED HEALTH CARE EDUCATION/TRAINING PROGRAM

## 2023-12-16 PROCEDURE — 97530 THERAPEUTIC ACTIVITIES: CPT

## 2023-12-16 RX ADMIN — METOPROLOL SUCCINATE 25 MG: 25 TABLET, EXTENDED RELEASE ORAL at 09:20

## 2023-12-16 RX ADMIN — ACETAMINOPHEN 650 MG: 325 TABLET ORAL at 09:24

## 2023-12-16 RX ADMIN — OXYCODONE 10 MG: 5 TABLET ORAL at 02:50

## 2023-12-16 RX ADMIN — ACETAMINOPHEN 650 MG: 325 TABLET ORAL at 05:21

## 2023-12-16 RX ADMIN — ATORVASTATIN CALCIUM 10 MG: 10 TABLET, FILM COATED ORAL at 09:20

## 2023-12-16 RX ADMIN — ASPIRIN 81 MG: 81 TABLET, COATED ORAL at 09:19

## 2023-12-16 RX ADMIN — POLYETHYLENE GLYCOL (3350) 17 G: 17 POWDER, FOR SOLUTION ORAL at 09:20

## 2023-12-16 RX ADMIN — GABAPENTIN 400 MG: 400 CAPSULE ORAL at 09:19

## 2023-12-16 RX ADMIN — OXYCODONE 10 MG: 5 TABLET ORAL at 09:20

## 2023-12-16 RX ADMIN — LEVOTHYROXINE SODIUM 50 MCG: 50 TABLET ORAL at 05:22

## 2023-12-16 RX ADMIN — HYDROMORPHONE HYDROCHLORIDE 0.5 MG: 1 INJECTION, SOLUTION INTRAMUSCULAR; INTRAVENOUS; SUBCUTANEOUS at 11:38

## 2023-12-16 ASSESSMENT — PAIN DESCRIPTION - ORIENTATION
ORIENTATION: RIGHT

## 2023-12-16 ASSESSMENT — PAIN SCALES - GENERAL
PAINLEVEL_OUTOF10: 8
PAINLEVEL_OUTOF10: 7
PAINLEVEL_OUTOF10: 4
PAINLEVEL_OUTOF10: 8
PAINLEVEL_OUTOF10: 7

## 2023-12-16 ASSESSMENT — PAIN DESCRIPTION - DESCRIPTORS
DESCRIPTORS: ACHING

## 2023-12-16 ASSESSMENT — PAIN DESCRIPTION - LOCATION
LOCATION: KNEE

## 2023-12-16 ASSESSMENT — PAIN - FUNCTIONAL ASSESSMENT: PAIN_FUNCTIONAL_ASSESSMENT: PREVENTS OR INTERFERES SOME ACTIVE ACTIVITIES AND ADLS

## 2023-12-16 NOTE — PLAN OF CARE
Problem: Discharge Planning  Goal: Discharge to home or other facility with appropriate resources  Outcome: Progressing     Problem: Pain  Goal: Verbalizes/displays adequate comfort level or baseline comfort level  Outcome: Progressing     Problem: Safety - Adult  Goal: Free from fall injury  Outcome: Progressing     Problem: ABCDS Injury Assessment  Goal: Absence of physical injury  Outcome: Progressing     Problem: Skin/Tissue Integrity - Adult  Goal: Skin integrity remains intact  Outcome: Progressing     Problem: Musculoskeletal - Adult  Goal: Return mobility to safest level of function  Outcome: Progressing     Problem: Genitourinary - Adult  Goal: Absence of urinary retention  Outcome: Progressing

## 2023-12-16 NOTE — PROGRESS NOTES
[facility] called and were updated by writer that pt was not being discharged tonight. Per report, discharge would be tomorrow. She verbalized understanding. Pt aware of this also.  Juanita Gongora RN

## 2023-12-16 NOTE — PROGRESS NOTES
[address] (swing bed) report called and given to [facility]  [phone].

## 2023-12-16 NOTE — CARE COORDINATION
Pt did not DC on 12/15. Transport arranged for 12/16 at noon via [facility] ambulance. RN, CM, pt, facility aware.      Call report to [phone]

## 2023-12-16 NOTE — PLAN OF CARE
Problem: Discharge Planning  Goal: Discharge to home or other facility with appropriate resources  12/16/2023 9067 by Severiano Fender, RN  Outcome: Progressing  12/15/2023 2254 by Abhishek Fields RN  Outcome: Progressing     Problem: Pain  Goal: Verbalizes/displays adequate comfort level or baseline comfort level  12/16/2023 3609 by Severiano Fender, RN  Outcome: Progressing  12/15/2023 2254 by Abhishek Fields RN  Outcome: Progressing     Problem: Skin/Tissue Integrity - Adult  Goal: Skin integrity remains intact  12/15/2023 2254 by Abhishek Fields RN  Outcome: Progressing

## 2023-12-16 NOTE — FLOWSHEET NOTE
12/16/23 1130   Vital Signs   Pulse 79   /68   MAP (Calculated) 85     Patient rating pain 7/10 PRN IV pain medication given prior to discharge. Discussed with Charge RN and agreed it was appropriate.

## 2023-12-16 NOTE — PROGRESS NOTES
Inpatient Physical Therapy Evaluation & Treatment    Unit: [facility]  Date:  12/16/2023  Patient Name:    Pam Brothers  Admitting diagnosis:  Osteoarthritis of right knee, unspecified osteoarthritis type [M17.11]  Arthritis of right knee [M17.11]  Admit Date:  12/14/2023  Precautions/Restrictions/WB Status/ Lines/ Wounds/ Oxygen: Fall risk, Bed/chair alarm, and Lines (IV),  FWB RLE with no KI (per ortho)    Pt seen for cotreatment this date due to patient endurance, acute illness/injury, and limited functional status information    Treatment Time:  9:50-10:44  Treatment Number:  2   Timed Code Treatment Minutes: 54 minutes  Total Treatment Minutes:   54 minutes    Patient Stated Goals for Therapy: \" to get home \"          Discharge Recommendations: SNF  DME needs for discharge: Defer to facility       Therapy recommendation for EMS Transport: can transport by wheelchair    Therapy recommendations for staff:   Assist of 1 for ambulation with use of rolling walker (RW) and gait belt to/from chair  to/from bathroom    History of Present Illness:   Per Dr. Jaylin Cardoza H&P:    The patient is a 78 y.o. female with R knee pain due to osteoarthritis. 12/14/23    Per ortho12/14/23:  Procedure: RIGHT TOTAL KNEE ARTHROPLASTY (Right: Knee)   POSTOPERATIVE PLAN:  The patient will be weightbearing as tolerated on the operative lower extremity with knee motion encouraged. Heel on a pillow  while lying down to encourage knee extension. Remove knee immobilizer tomorrow. The patient will work  with PT. They will be admitted. Plan will be to discharge to SNF on postop day 1 or 2 depending on their progress and pain control. Follow up in 10-14 days. Percocet for pain control. MiraLAX for constipation. Aspirin 81 mg twice daily for DVT prophylaxis.       Home Health S4 Level Recommendation:  NA        AM-PAC Mobility Score    AM-PAC Inpatient Mobility Raw Score : 16  AM-PAC Inpatient Mobility without Stair Climbing Raw Score :

## 2023-12-26 ENCOUNTER — TELEPHONE (OUTPATIENT)
Dept: ORTHOPEDIC SURGERY | Age: 78
End: 2023-12-26

## 2023-12-26 NOTE — TELEPHONE ENCOUNTER
Received a referral for Home Health therapy , nursing, needs information. Needs information wb status, dressing change order, med list , hp and discharge summary.   Fax [phone]

## 2023-12-27 DIAGNOSIS — M17.11 PRIMARY OSTEOARTHRITIS OF RIGHT KNEE: ICD-10-CM

## 2023-12-27 RX ORDER — TRAMADOL HYDROCHLORIDE 50 MG/1
50 TABLET ORAL EVERY 6 HOURS PRN
Qty: 28 TABLET | Refills: 0 | Status: SHIPPED | OUTPATIENT
Start: 2023-12-27 | End: 2024-01-11 | Stop reason: SDUPTHER

## 2024-01-02 ENCOUNTER — TELEPHONE (OUTPATIENT)
Dept: ORTHOPEDIC SURGERY | Age: 79
End: 2024-01-02

## 2024-01-02 NOTE — TELEPHONE ENCOUNTER
Called patient and spoke to her in regards to the blood clot, also made Dr. Jimenez aware of this and he said that it is fine, we have to treat the blood clot.

## 2024-01-02 NOTE — TELEPHONE ENCOUNTER
General Question     Subject: PATIENT HAS A BLOOD CLOT AND IS TAKING XARELTO 1 PILL TWICE A DAY FOR 3 WEEKS.  Patient: Fabiana Ceballos  Contact Number: [phone]

## 2024-01-03 NOTE — TELEPHONE ENCOUNTER
Called patient and let her know I spoke with Dr. Jimenez and he said for her to continue but just with gentle ROM.

## 2024-01-03 NOTE — TELEPHONE ENCOUNTER
General Question     Subject: BLOOD CLOT QUESTIONS  Patient and /or Facility Request: KATYA HART  Contact Number: [phone]    PT CALLED BACK WANTING TO KNOW IF SHE SHOULD BE CONTINUING PHYSICAL THERAPY WITH BLOOD CLOT.    PLEASE CALL PT BACK AT THE ABOVE NUMBER.

## 2024-01-04 NOTE — TELEPHONE ENCOUNTER
General Question     Subject: HOME HEALTHCARE VIKY NEEDS VERBAL ON THE BELOW INSTRUCTIONS, PLEASE?     Patient and /or Facility Request: [facility]     Contact Number: [phone]     VIKY MENA PT, NEEDS A VERBAL FROM THE OFFICE ABOUT THE INSTRUCTIONS IN THIS MESSAGE EXCHANGE. PLEASE CALL TO INFORM HER, ASAP.

## 2024-01-08 ENCOUNTER — TELEPHONE (OUTPATIENT)
Dept: ORTHOPEDIC SURGERY | Age: 79
End: 2024-01-08

## 2024-01-11 DIAGNOSIS — M17.11 PRIMARY OSTEOARTHRITIS OF RIGHT KNEE: ICD-10-CM

## 2024-01-11 RX ORDER — TRAMADOL HYDROCHLORIDE 50 MG/1
50 TABLET ORAL EVERY 6 HOURS PRN
Qty: 28 TABLET | Refills: 0 | Status: SHIPPED | OUTPATIENT
Start: 2024-01-11 | End: 2024-01-29 | Stop reason: SDUPTHER

## 2024-01-12 ENCOUNTER — OFFICE VISIT (OUTPATIENT)
Dept: ORTHOPEDIC SURGERY | Age: 79
End: 2024-01-12

## 2024-01-12 DIAGNOSIS — Z96.651 H/O TOTAL KNEE REPLACEMENT, RIGHT: Primary | ICD-10-CM

## 2024-01-12 NOTE — PROGRESS NOTES
Chief Complaint  Knee Pain (Fu rt knee/)      History of Present Illness:  The patient is here for repeat evaluation regarding her right knee.  The patient unfortunately was found to have a blood clot in [facility] and was placed on Xarelto.  Fortunately the blood clot was distal to the knee.  The patient had to hold physical therapy for a little bit.  She is worried that this may have set her back.  Her pain is improving.  She is walking with a walker today.  She is a month out from the surgery.    Prior HPI 12/22/2023:  Fabiana Ceballos is a pleasant 78 y.o. female here today for her first postop evaluation for her right total knee replacement.  Date of surgery 12/14/2023.  The patient is doing well.  She is currently in [facility] outpatient bed but she is going to be discharged home tomorrow and get set up with home health care and PT.  She denies any fevers or chills or instability.  She is in a wheelchair today.       Medical History:  Patient's medications, allergies, past medical, surgical, social and family histories were reviewed and updated as appropriate.    Pertinent items are noted in HPI  Review of systems reviewed from Patient History Form dated on 1/12/24 and available in the patient's chart under the Media tab.       Vital Signs:  There were no vitals filed for this visit.      Constitutional: In no apparent distress. Normal affect. Alert and oriented X3 and is cooperative.       Right knee exam    Well-healed surgical incisions about the knee.  No signs of infection or drainage.  Knee range of motion 3 degree extension lag to 120 degrees of flexion.  Stable to varus and valgus stress.  Sensation intact throughout the lower extremity without paresthesias.      Radiology:       3 views of the right knee taken in the office today demonstrate a stable total knee arthroplasty component without complication.  Small anterior notching noted on the distal femur without displacement.    Doppler

## 2024-01-29 ENCOUNTER — TELEPHONE (OUTPATIENT)
Dept: ORTHOPEDIC SURGERY | Age: 79
End: 2024-01-29

## 2024-01-29 DIAGNOSIS — M17.11 PRIMARY OSTEOARTHRITIS OF RIGHT KNEE: ICD-10-CM

## 2024-01-29 RX ORDER — TRAMADOL HYDROCHLORIDE 50 MG/1
50 TABLET ORAL EVERY 6 HOURS PRN
Qty: 28 TABLET | Refills: 0 | Status: SHIPPED | OUTPATIENT
Start: 2024-01-29 | End: 2024-02-05

## 2024-01-30 ENCOUNTER — TELEPHONE (OUTPATIENT)
Dept: ORTHOPEDIC SURGERY | Age: 79
End: 2024-01-30

## 2024-01-30 NOTE — TELEPHONE ENCOUNTER
General Question     Subject: ORDERS THAT NEED SIGNED  Patient and /or Facility Request: Fabiana Ceballos  Contact Number:       LORETO FROM [facility] CALLED AND THEY HAVE SOME ORDERS THAT DR WATTERS NEEDS TO SIGN.  THEY JUST FAXED THEM AGAIN.  HER PHONE # [phone]

## 2024-02-19 ENCOUNTER — TELEPHONE (OUTPATIENT)
Dept: ORTHOPEDIC SURGERY | Age: 79
End: 2024-02-19

## 2024-02-19 DIAGNOSIS — M17.11 PRIMARY OSTEOARTHRITIS OF RIGHT KNEE: ICD-10-CM

## 2024-02-19 RX ORDER — TRAMADOL HYDROCHLORIDE 50 MG/1
50 TABLET ORAL EVERY 6 HOURS PRN
Qty: 28 TABLET | Refills: 0 | Status: SHIPPED | OUTPATIENT
Start: 2024-02-19 | End: 2024-02-26

## 2024-02-19 NOTE — TELEPHONE ENCOUNTER
Prescription Refill     Medication Name:  TRAMADOL  Pharmacy: [facility]  Patient Contact Number:  [phone]

## 2024-02-23 ENCOUNTER — OFFICE VISIT (OUTPATIENT)
Dept: ORTHOPEDIC SURGERY | Age: 79
End: 2024-02-23

## 2024-02-23 DIAGNOSIS — M17.11 PRIMARY OSTEOARTHRITIS OF RIGHT KNEE: Primary | ICD-10-CM

## 2024-02-23 PROCEDURE — 99024 POSTOP FOLLOW-UP VISIT: CPT | Performed by: STUDENT IN AN ORGANIZED HEALTH CARE EDUCATION/TRAINING PROGRAM

## 2024-02-23 NOTE — PROGRESS NOTES
Chief Complaint  Knee Pain (Ck rt knee)      History of Present Illness:  The patient is here for repeat evaluation regarding her right knee.  The patient is 2 and half months out.  She is doing very well.  She has about another month of the blood thinner left.    Prior HPI 1/12/2024:  The patient is here for repeat evaluation regarding her right knee.  The patient unfortunately was found to have a blood clot in [facility] and was placed on Xarelto.  Fortunately the blood clot was distal to the knee.  The patient had to hold physical therapy for a little bit.  She is worried that this may have set her back.  Her pain is improving.  She is walking with a walker today.  She is a month out from the surgery.    Prior HPI 12/22/2023:  Fabiana Ceballos is a pleasant 78 y.o. female here today for her first postop evaluation for her right total knee replacement.  Date of surgery 12/14/2023.  The patient is doing well.  She is currently in [facility] outpatient bed but she is going to be discharged home tomorrow and get set up with home health care and PT.  She denies any fevers or chills or instability.  She is in a wheelchair today.       Medical History:  Patient's medications, allergies, past medical, surgical, social and family histories were reviewed and updated as appropriate.    Pertinent items are noted in HPI  Review of systems reviewed from Patient History Form dated on 2/23/24 and available in the patient's chart under the Media tab.       Vital Signs:  There were no vitals filed for this visit.      Constitutional: In no apparent distress. Normal affect. Alert and oriented X3 and is cooperative.       Right knee exam    Well-healed surgical incisions about the knee.  No signs of infection or drainage.  Full knee range of motion appreciated.  Stable to varus and valgus stress.  Sensation intact throughout the lower extremity without paresthesias.      Radiology:       3 views of the right knee taken

## 2024-03-21 DIAGNOSIS — M17.11 PRIMARY OSTEOARTHRITIS OF RIGHT KNEE: ICD-10-CM

## 2024-03-22 RX ORDER — TRAMADOL HYDROCHLORIDE 50 MG/1
50 TABLET ORAL EVERY 6 HOURS PRN
Qty: 28 TABLET | Refills: 0 | Status: SHIPPED | OUTPATIENT
Start: 2024-03-22 | End: 2024-03-29

## 2024-04-04 NOTE — PROGRESS NOTES
[facility]  DIVISION OF OTOLARYNGOLOGY- HEAD & NECK SURGERY  CONSULT    Patient Name: Fabiana Ceballos  Medical Record Number:  [id number]  Primary Care Physician:  Daniel Bustos  Date of Consultation: 4/9/2024    Chief Complaint:   Chief Complaint   Patient presents with    New Patient    Hearing Problem    Ear Problem     Ear infection on PCN x 2 wk.  Last antibiotic about 3 wk ago.       HISTORY OF PRESENT ILLNESS  Fabiana is a(n) 78 y.o. female who presents for evaluation of dizziness and hearing loss.  She states that she had an ear infection which caused her to be off balance.  She was treated with 2 weeks of antibiotics and thinks that her balance is better.  She denies any pain or discomfort.  She not have any change in hearing throughout the course  Patient Active Problem List   Diagnosis    DDD (degenerative disc disease), lumbar    Unilateral primary osteoarthritis, right knee    Right knee pain    Arthritis of right knee    Coronary arteriosclerosis    Diastolic dysfunction    Dyslipidemia    Hypertension    Hypothyroid    H/O total knee replacement, right     Past Surgical History:   Procedure Laterality Date    BLADDER REPAIR      CHOLECYSTECTOMY      COLONOSCOPY      CORONARY ANGIOPLASTY      HYSTERECTOMY (CERVIX STATUS UNKNOWN)      LUMBAR DISCECTOMY  08/22/2011    microlumber L4-5 left.    TOTAL KNEE ARTHROPLASTY Right 12/14/2023    TOTAL KNEE ARTHROPLASTY Right 12/14/2023    RIGHT TOTAL KNEE ARTHROPLASTY performed by Daniel Jimenez DO at [facility]     Family History   Problem Relation Age of Onset    Diabetes Mother     Cancer Father     Diabetes Sister     Diabetes Brother      Social History     Socioeconomic History    Marital status:      Spouse name: Not on file    Number of children: Not on file    Years of education: Not on file    Highest education level: Not on file   Occupational History    Not on file   Tobacco Use    Smoking status: Never    Smokeless tobacco: Never

## 2024-04-09 ENCOUNTER — PROCEDURE VISIT (OUTPATIENT)
Dept: AUDIOLOGY | Age: 79
End: 2024-04-09
Payer: MEDICARE

## 2024-04-09 ENCOUNTER — OFFICE VISIT (OUTPATIENT)
Dept: ENT CLINIC | Age: 79
End: 2024-04-09
Payer: MEDICARE

## 2024-04-09 VITALS
HEIGHT: 64 IN | SYSTOLIC BLOOD PRESSURE: 120 MMHG | DIASTOLIC BLOOD PRESSURE: 70 MMHG | BODY MASS INDEX: 31.58 KG/M2 | WEIGHT: 185 LBS | HEART RATE: 62 BPM

## 2024-04-09 DIAGNOSIS — R42 DIZZINESS: ICD-10-CM

## 2024-04-09 DIAGNOSIS — H90.3 SENSORINEURAL HEARING LOSS, BILATERAL: Primary | ICD-10-CM

## 2024-04-09 DIAGNOSIS — H90.3 SENSORINEURAL HEARING LOSS (SNHL) OF BOTH EARS: Primary | ICD-10-CM

## 2024-04-09 PROCEDURE — 1123F ACP DISCUSS/DSCN MKR DOCD: CPT | Performed by: STUDENT IN AN ORGANIZED HEALTH CARE EDUCATION/TRAINING PROGRAM

## 2024-04-09 PROCEDURE — 3078F DIAST BP <80 MM HG: CPT | Performed by: STUDENT IN AN ORGANIZED HEALTH CARE EDUCATION/TRAINING PROGRAM

## 2024-04-09 PROCEDURE — 92557 COMPREHENSIVE HEARING TEST: CPT | Performed by: AUDIOLOGIST

## 2024-04-09 PROCEDURE — 99204 OFFICE O/P NEW MOD 45 MIN: CPT | Performed by: STUDENT IN AN ORGANIZED HEALTH CARE EDUCATION/TRAINING PROGRAM

## 2024-04-09 PROCEDURE — 92567 TYMPANOMETRY: CPT | Performed by: AUDIOLOGIST

## 2024-04-09 PROCEDURE — 3074F SYST BP LT 130 MM HG: CPT | Performed by: STUDENT IN AN ORGANIZED HEALTH CARE EDUCATION/TRAINING PROGRAM

## 2024-04-09 ASSESSMENT — ENCOUNTER SYMPTOMS
VOMITING: 0
SHORTNESS OF BREATH: 0
RHINORRHEA: 0
NAUSEA: 0
COUGH: 0
EYE PAIN: 0

## 2024-04-09 NOTE — PROGRESS NOTES
Fabiana Ceballos   1945, 78 y.o. female   [id number]       Referring Provider: David Lan DO   Referral Type: In an order in Epic    Reason for Visit: Evaluation of the cause of disorders of hearing, tinnitus, or balance.    ADULT AUDIOLOGIC EVALUATION      Fabiana Ceballos is a 78 y.o. female seen today, 4/9/2024 , for an initial audiologic evaluation.  Patient was seen by David Lan DO  following today's evaluation.    AUDIOLOGIC AND OTHER PERTINENT MEDICAL HISTORY:      Fabiana Ceballos reports bilateral ear infections that have been present for 3 weeks. She reports taking antibiotics from her PCP for 2 weeks without resolve in symptoms. She currently wears hearing aids that were purchased from an outside facility. She reports aural pressure and intermittent pain, along with dizziness. No other significant otologic history reported.      She denied otorrhea, tinnitus, history of falls, history of significant noise exposure, history of head trauma, and history of ear surgery.    Date: 4/9/2024     IMPRESSIONS:      Today's results revealed a symmetric mild sloping to moderate sensorineural hearing loss. Fair speech understanding when in quiet. Tympanometry indicates normal middle ear function. Discussed test results and implications with patient.  Follow medical recommendations of David Lan DO.    ASSESSMENT AND FINDINGS:     Otoscopy unremarkable.    RIGHT EAR:  Hearing Sensitivity: A mild sloping to moderate sensorineural hearing loss.  Speech Recognition Threshold: 50 dB HL  Word Recognition: Fair 72%, based on NU-6 25-word list at 85 dBHL using recorded speech stimuli.    Tympanometry: Normal peak pressure and compliance, Type A tympanogram, consistent with normal middle ear function. Volume 2.4 cm3, Peak -4 daPa, 0.57 mmho.      LEFT EAR:  Hearing Sensitivity: A mild sloping to moderate sensorineural hearing loss.  Speech Recognition Threshold: 45 dB HL  Word Recognition: Fair 68%, based on NU-6 25-word

## 2024-04-22 DIAGNOSIS — M17.11 PRIMARY OSTEOARTHRITIS OF RIGHT KNEE: ICD-10-CM

## 2024-04-22 RX ORDER — TRAMADOL HYDROCHLORIDE 50 MG/1
50 TABLET ORAL EVERY 6 HOURS PRN
Qty: 28 TABLET | Refills: 0 | Status: CANCELLED | OUTPATIENT
Start: 2024-04-22 | End: 2024-04-29

## 2024-06-21 ENCOUNTER — OFFICE VISIT (OUTPATIENT)
Dept: ORTHOPEDIC SURGERY | Age: 79
End: 2024-06-21

## 2024-06-21 VITALS — BODY MASS INDEX: 31.58 KG/M2 | WEIGHT: 185 LBS | HEIGHT: 64 IN

## 2024-06-21 DIAGNOSIS — M17.11 PRIMARY OSTEOARTHRITIS OF RIGHT KNEE: Primary | ICD-10-CM

## 2024-06-21 NOTE — PROGRESS NOTES
Chief Complaint  Knee Pain (CK R KNEE)      History of Present Illness:  The patient is here today for 6-month evaluation regarding her right knee.  The patient is doing well.  She denies any major setbacks.  She has a little bit of numbness along the lateral aspect of her knee.    Prior HPI 2/23/2024:  The patient is here for repeat evaluation regarding her right knee.  The patient is 2 and half months out.  She is doing very well.  She has about another month of the blood thinner left.    Prior HPI 1/12/2024:  The patient is here for repeat evaluation regarding her right knee.  The patient unfortunately was found to have a blood clot in [facility] and was placed on Xarelto.  Fortunately the blood clot was distal to the knee.  The patient had to hold physical therapy for a little bit.  She is worried that this may have set her back.  Her pain is improving.  She is walking with a walker today.  She is a month out from the surgery.    Prior HPI 12/22/2023:  Fabiana Ceballos is a pleasant 78 y.o. female here today for her first postop evaluation for her right total knee replacement.  Date of surgery 12/14/2023.  The patient is doing well.  She is currently in [facility] outpatient bed but she is going to be discharged home tomorrow and get set up with home health care and PT.  She denies any fevers or chills or instability.  She is in a wheelchair today.       Medical History:  Patient's medications, allergies, past medical, surgical, social and family histories were reviewed and updated as appropriate.    Pertinent items are noted in HPI  Review of systems reviewed from Patient History Form dated on 6/21/24 and available in the patient's chart under the Media tab.       Vital Signs:  There were no vitals filed for this visit.      Constitutional: In no apparent distress. Normal affect. Alert and oriented X3 and is cooperative.       Right knee exam    Well-healed surgical incisions about the knee.  No signs

## 2024-11-15 ENCOUNTER — OFFICE VISIT (OUTPATIENT)
Dept: ORTHOPEDIC SURGERY | Age: 79
End: 2024-11-15

## 2024-11-15 VITALS — HEIGHT: 64 IN | BODY MASS INDEX: 28.68 KG/M2 | WEIGHT: 168 LBS

## 2024-11-15 DIAGNOSIS — Z96.651 H/O TOTAL KNEE REPLACEMENT, RIGHT: Primary | ICD-10-CM

## 2024-11-15 NOTE — PROGRESS NOTES
Chief Complaint  Knee Pain (CK RT KNEE)      History of Present Illness:  The patient is here for repeat evaluation regarding her right knee.  The patient is nearly a year out from her knee replacement.  The patient was doing well until she had a major back surgery in August 2024.  Since then she has had some constant pain in the knee in the groin.  She was not able to get around do much after the back surgery.  She also reports some bilateral hip pain at night when she tries to go to sleep.  This is all new from the surgery.    Prior HPI 6/21/2024:  The patient is here today for 6-month evaluation regarding her right knee.  The patient is doing well.  She denies any major setbacks.  She has a little bit of numbness along the lateral aspect of her knee.    Prior HPI 2/23/2024:  The patient is here for repeat evaluation regarding her right knee.  The patient is 2 and half months out.  She is doing very well.  She has about another month of the blood thinner left.    Prior HPI 1/12/2024:  The patient is here for repeat evaluation regarding her right knee.  The patient unfortunately was found to have a blood clot in [facility] and was placed on Xarelto.  Fortunately the blood clot was distal to the knee.  The patient had to hold physical therapy for a little bit.  She is worried that this may have set her back.  Her pain is improving.  She is walking with a walker today.  She is a month out from the surgery.    Prior HPI 12/22/2023:  Fabiana Ceballos is a pleasant 79 y.o. female here today for her first postop evaluation for her right total knee replacement.  Date of surgery 12/14/2023.  The patient is doing well.  She is currently in [facility] outpatient bed but she is going to be discharged home tomorrow and get set up with home health care and PT.  She denies any fevers or chills or instability.  She is in a wheelchair today.       Medical History:  Patient's medications, allergies, past medical,

## 2024-12-24 ENCOUNTER — HOSPITAL ENCOUNTER (INPATIENT)
Age: 79
DRG: 949 | End: 2024-12-24
Attending: STUDENT IN AN ORGANIZED HEALTH CARE EDUCATION/TRAINING PROGRAM | Admitting: STUDENT IN AN ORGANIZED HEALTH CARE EDUCATION/TRAINING PROGRAM
Payer: MEDICARE

## 2024-12-24 PROBLEM — S06.5XAA SDH (SUBDURAL HEMATOMA): Status: ACTIVE | Noted: 2024-12-24

## 2024-12-24 PROCEDURE — 6370000000 HC RX 637 (ALT 250 FOR IP): Performed by: STUDENT IN AN ORGANIZED HEALTH CARE EDUCATION/TRAINING PROGRAM

## 2024-12-24 PROCEDURE — 1280000000 HC REHAB R&B

## 2024-12-24 PROCEDURE — 6360000002 HC RX W HCPCS: Performed by: STUDENT IN AN ORGANIZED HEALTH CARE EDUCATION/TRAINING PROGRAM

## 2024-12-24 RX ORDER — FLUTICASONE PROPIONATE 50 MCG
1 SPRAY, SUSPENSION (ML) NASAL NIGHTLY
Status: DISPENSED | OUTPATIENT
Start: 2024-12-24

## 2024-12-24 RX ORDER — LEVETIRACETAM 500 MG/1
500 TABLET ORAL 2 TIMES DAILY
Status: ON HOLD | COMMUNITY

## 2024-12-24 RX ORDER — LEVETIRACETAM 250 MG/1
250 TABLET ORAL 2 TIMES DAILY
Status: COMPLETED | OUTPATIENT
Start: 2024-12-24 | End: 2024-12-26

## 2024-12-24 RX ORDER — LISINOPRIL 20 MG/1
20 TABLET ORAL 2 TIMES DAILY
Status: DISCONTINUED | OUTPATIENT
Start: 2024-12-24 | End: 2024-12-24

## 2024-12-24 RX ORDER — CETIRIZINE HYDROCHLORIDE 5 MG/1
5 TABLET ORAL DAILY
Status: DISPENSED | OUTPATIENT
Start: 2024-12-25

## 2024-12-24 RX ORDER — MECOBALAMIN 5000 MCG
5 TABLET,DISINTEGRATING ORAL NIGHTLY PRN
Status: DISPENSED | OUTPATIENT
Start: 2024-12-24

## 2024-12-24 RX ORDER — GABAPENTIN 100 MG/1
200 CAPSULE ORAL EVERY 6 HOURS
Status: DISPENSED | OUTPATIENT
Start: 2024-12-24

## 2024-12-24 RX ORDER — LIDOCAINE 50 MG/G
1 PATCH TOPICAL NIGHTLY
Status: ON HOLD | COMMUNITY

## 2024-12-24 RX ORDER — LEVOTHYROXINE SODIUM 50 UG/1
50 TABLET ORAL DAILY
Status: DISPENSED | OUTPATIENT
Start: 2024-12-25

## 2024-12-24 RX ORDER — LIDOCAINE 4 G/G
1 PATCH TOPICAL DAILY
Status: DISPENSED | OUTPATIENT
Start: 2024-12-24

## 2024-12-24 RX ORDER — POLYETHYLENE GLYCOL 3350 17 G/17G
17 POWDER, FOR SOLUTION ORAL DAILY PRN
Status: ACTIVE | OUTPATIENT
Start: 2024-12-24

## 2024-12-24 RX ORDER — HEPARIN SODIUM 5000 [USP'U]/ML
5000 INJECTION, SOLUTION INTRAVENOUS; SUBCUTANEOUS EVERY 8 HOURS SCHEDULED
Status: DISPENSED | OUTPATIENT
Start: 2024-12-24

## 2024-12-24 RX ORDER — M-VIT,TX,IRON,MINS/CALC/FOLIC 27MG-0.4MG
1 TABLET ORAL DAILY
Status: DISPENSED | OUTPATIENT
Start: 2024-12-25

## 2024-12-24 RX ORDER — BISACODYL 10 MG
10 SUPPOSITORY, RECTAL RECTAL DAILY PRN
Status: ACTIVE | OUTPATIENT
Start: 2024-12-24

## 2024-12-24 RX ORDER — METHENAMINE HIPPURATE 1000 MG/1
1 TABLET ORAL 2 TIMES DAILY WITH MEALS
Status: ON HOLD | COMMUNITY

## 2024-12-24 RX ORDER — SENNA AND DOCUSATE SODIUM 50; 8.6 MG/1; MG/1
1 TABLET, FILM COATED ORAL 2 TIMES DAILY
Status: DISCONTINUED | OUTPATIENT
Start: 2024-12-24 | End: 2024-12-26

## 2024-12-24 RX ORDER — ATORVASTATIN CALCIUM 10 MG/1
10 TABLET, FILM COATED ORAL DAILY
Status: DISPENSED | OUTPATIENT
Start: 2024-12-25

## 2024-12-24 RX ORDER — ONDANSETRON 4 MG/1
4 TABLET, ORALLY DISINTEGRATING ORAL EVERY 8 HOURS PRN
Status: ACTIVE | OUTPATIENT
Start: 2024-12-24

## 2024-12-24 RX ORDER — ACETAMINOPHEN 325 MG/1
650 TABLET ORAL EVERY 4 HOURS PRN
Status: DISPENSED | OUTPATIENT
Start: 2024-12-24

## 2024-12-24 RX ORDER — HYDRALAZINE HYDROCHLORIDE 10 MG/1
10 TABLET, FILM COATED ORAL EVERY 6 HOURS PRN
Status: ACTIVE | OUTPATIENT
Start: 2024-12-24

## 2024-12-24 RX ORDER — OXYCODONE HYDROCHLORIDE 5 MG/1
5 TABLET ORAL EVERY 4 HOURS PRN
Status: ON HOLD | COMMUNITY

## 2024-12-24 RX ORDER — ASPIRIN 81 MG/1
81 TABLET, CHEWABLE ORAL DAILY
Status: DISPENSED | OUTPATIENT
Start: 2024-12-27

## 2024-12-24 RX ORDER — QUETIAPINE FUMARATE 25 MG/1
25 TABLET, FILM COATED ORAL NIGHTLY
Status: DISCONTINUED | OUTPATIENT
Start: 2024-12-24 | End: 2025-01-05

## 2024-12-24 RX ORDER — PANTOPRAZOLE SODIUM 40 MG/1
40 TABLET, DELAYED RELEASE ORAL
Status: DISPENSED | OUTPATIENT
Start: 2024-12-25

## 2024-12-24 RX ORDER — METOPROLOL TARTRATE 25 MG/1
25 TABLET, FILM COATED ORAL 2 TIMES DAILY
Status: DISPENSED | OUTPATIENT
Start: 2024-12-24

## 2024-12-24 RX ORDER — OXYCODONE HYDROCHLORIDE 5 MG/1
5 TABLET ORAL EVERY 4 HOURS PRN
Status: DISPENSED | OUTPATIENT
Start: 2024-12-24

## 2024-12-24 RX ADMIN — LEVETIRACETAM 250 MG: 250 TABLET, FILM COATED ORAL at 20:12

## 2024-12-24 RX ADMIN — HEPARIN SODIUM 5000 UNITS: 5000 INJECTION INTRAVENOUS; SUBCUTANEOUS at 20:21

## 2024-12-24 RX ADMIN — Medication 5 MG: at 20:12

## 2024-12-24 RX ADMIN — Medication 1 TABLET: at 20:12

## 2024-12-24 RX ADMIN — METOPROLOL TARTRATE 25 MG: 25 TABLET, FILM COATED ORAL at 20:12

## 2024-12-24 RX ADMIN — QUETIAPINE FUMARATE 25 MG: 25 TABLET ORAL at 20:12

## 2024-12-24 RX ADMIN — FLUTICASONE PROPIONATE 1 SPRAY: 50 SPRAY, METERED NASAL at 20:13

## 2024-12-24 RX ADMIN — ACETAMINOPHEN 650 MG: 325 TABLET ORAL at 20:18

## 2024-12-24 RX ADMIN — GABAPENTIN 200 MG: 100 CAPSULE ORAL at 20:12

## 2024-12-24 ASSESSMENT — PAIN DESCRIPTION - DESCRIPTORS: DESCRIPTORS: ACHING

## 2024-12-24 ASSESSMENT — PAIN SCALES - GENERAL
PAINLEVEL_OUTOF10: 3
PAINLEVEL_OUTOF10: 0

## 2024-12-24 ASSESSMENT — PAIN SCALES - WONG BAKER: WONGBAKER_NUMERICALRESPONSE: NO HURT

## 2024-12-24 ASSESSMENT — LIFESTYLE VARIABLES: HOW OFTEN DO YOU HAVE A DRINK CONTAINING ALCOHOL: NEVER

## 2024-12-24 ASSESSMENT — PAIN DESCRIPTION - LOCATION: LOCATION: HEAD

## 2024-12-24 NOTE — PROGRESS NOTES
IRF LAST Admission Assessment  [facility] Acute Rehab Unit    Patient:Fabiana Ceballos     Rehab Dx/Hx: SDH (subdural hematoma) [S06.5XAA]   :1945  MRN:[id number]  Date of Admit: 2024     Pain Effect on Sleep:  Over the past 5 days, how much of the time has pain made it hard for you to sleep at night?  [x]  0. Does not apply - I have not had any pain or hurting in the past 5 days  []  1. Rarely or not at all  []  2. Occasionally  []  3. Frequently  []  4. Almost constantly  []  8. Unable to answer    Over the past 5 days, how often have you limited your participation in rehabilitation therapy sessions due to pain?  [x]  0. Does not apply - I have not received rehabilitation therapy in the past 5 days  []  1. Rarely or not at all  []  2. Occasionally  []  3. Frequently  []  4. Almost constantly  []  8. Unable to answer    Pain Interference with Day-to-Day Activities:  Over the past 5 days, how often have you limited your day-to-day activities (excluding rehabilitation therapy session)?  [x]  1. Rarely or not at all  []  2. Occasionally  []  3. Frequently  []  4. Almost constantly  []  8. Unable to answer      High-Risk Drug Classes: Use and Indication   Is taking: Check if the pt is taking any medications by pharmacological classification  Indication noted: If column 1 is checked, check if there is an indication noted for all meds in the drug class Is taking  (check all that apply) Indication noted (check all that apply)   Antipsychotic [x] [x]   Anticoagulant [] []   Antibiotic [] []   Opioid [x] [x]   Antiplatelet [] []   Hypoglycemic (including insulin) [] []   None of the above [] []     Special Treatments, Procedures, and Programs   Check all of the following treatments, procedures, and programs that apply on admission.  On admission (check all that apply)   Cancer Treatments    A1. Chemotherapy []   A2. IV []   A3. Oral []   A10. Other []   B1. Radiation []   Respiratory Therapies    C1. Oxygen  Therapy []   C2. Continuous []   C3. Intermittent []   C4. High-concentration []   D1. Suctioning []   D2. Scheduled []   D3. As needed []   E1. Tracheostomy Care []   F1. Invasive Mechanical Ventilator (ventilator or respirator) []   G1. Non-invasive Mechanical Ventilator []   G2. BiPAP []   G3. CPAP []   Other    H1. IV Medications []   H2. Vasoactive medications []   H3. Antibiotics []   H4. Anticoagulation []   H10. Other []   I1. Transfusions []   J1. Dialysis []   J2. Hemodialysis []   J3. Peritoneal dialysis []   O1. IV access []   O2. Peripheral []   O3. Midline []   O4. Central (PICC, tunneled, port) []   None of the above []     Signature: Jeff Lyle RN

## 2024-12-24 NOTE — PROGRESS NOTES
declines to respond                  [] None of the above   Preferred Language: English       6  Patient Mood Interview    Symptom Presence  0. No (enter 0 in column 2)  1. Yes (enter 0-3 in column 2)  9. No response (leave column 2 blank  Symptom Frequency  0. Never or 1 day  1. 2-6 days (several days)  2. 7-11 days (half or more days)  3. 12-14 days (nearly everyday) 1. Symptom Presence 2. Symptom Frequency    Enter scores in boxes   Little interest or pleasure in doing things   0 0   Feeling down, depressed, or hopeless   0 0   If either A or B is coded 2 or 3, CONTINUE asking the questions below.  If not, END the interview.     Trouble falling or staying asleep, or sleeping too much       Feeling tired or having little energy       Poor appetite or overeating       Feeling bad about yourself - or that you are a failure or have let yourself or your family down       Trouble concentrating on things, such as reading the newspaper or watching television       Moving or speaking so slowly that other people could have noticed.  Or the opposite- being so fidgety or restless that you have been moving around a lot more than usual.       Thoughts that you would be better off dead, or of hurting yourself in some way.       Total Severity: Add scores for all frequency responses in column 2   0 0   Social isolation (from [employer])  How often do you feel lonely or isolated from those around you?   [x] 0. Never  [] 1. Rarely  [] 2. Sometimes  [] 3. Often  [] 4. Always  [] 7. Patient declines to respond  [] 8. Patient unable to respond       Admission BIMS:8  Number of word repeated after first attempt:  []  0. None []  1. One []  2. Two [x]  3. Three    Able to report correct year:  []  0. Missed by >5 years, or no answer  []  1. Missed by 2-5 years  []  2. Missed by 1 year  [x]  3. Correct    Able to report correct month:   []  0. Missed by >1 month, or no answer  []  1. Missed by 6 days to 1 month  [x]  2. Accurate  within 5 days    Able to report correct day of the week:  [x]  0. Incorrect or no answer  []  1. Correct    Recall:   Able to recall \"sock\":  []  0. No could not recall  []  1. Yes, after cueing  []  2. Yes, no cue required  Able to recall \"blue\":  [x]  0. No could not recall  []  1. Yes, after cueing  []  2. Yes, no cue required  Able to recall \"bed\":  [x]  0. No could not recall  []  1. Yes, after cueing  []  2. Yes, no cue required      4 Eyes Skin Assessment   The patient is being assessed for: Admission     I agree that 2 RN's have performed a thorough Head to Toe Skin Assessment on the patient. ALL assessment sites listed below have been assessed.       Areas assessed by both nurses:   [x]   Head, Face, and Ears   [x]   Shoulders, Back, and Chest, Abdomen  [x]   Arms, Elbows, and Hands   [x]   Coccyx, Sacrum, and Ischium  [x]   Legs, Feet, and Heel     Does the Patient have Skin Breakdown?   No         Geoff Prevention initiated:  Yes  Wound Care Orders initiated: Not Applicable      [facility] nurse consulted for Pressure Injury (Stage 3,4, Unstageable, DTI, NWPT, Complex wounds)and New or Established Ostomies:  Not Applicable    Primary Nurse eSignature: Jeff Lyle RN    Co-signer eSignature:  Ama Elizondo RN

## 2024-12-24 NOTE — H&P
Patient: Fabiana Ceballos  [id number]  Date: 12/24/2024      Chief Complaint: left SDH    History of Present Illness/Hospital Course:  Fabiana Ceballos is an 79 year old female with a past medical history significant for CAD s/p PCI, prior DVT (after TKA), HTN, HLD, T5-sacral fusion (8/2024) who presented to [facility] on 12/18/24 from clinic after a fall with headache and AMS. She was found to have left SDH. On 12/19/24 she underwent left craniotomy. Course notable for AMS. Precedex was weaned off and she is on seroquel qhs. She was admitted to [facility] on 12/24/24 due to functional deficits below her baseline. Today she is seen with her daughter present. She denies focal weakness due to SDH. She reports history of right TKA and some residual weakness and numbness around her knee due to that. She has a history of neuropathy and report numbness on the bottom of her feet bilaterally. Her daughter notes that she has been having frequent soft stools recently. She lives alone in a multilevel house, but is able to stay on the main level. She has a ramp to enter, but does have one step up to her bathroom. She has a supportive family that live close by. She enjoys watching [employer].     Prior Level of Function:  Needed some help with self care  Independent for indoor mobility, stairs, functional cognition     Current Level of Function:  Mod assist to roll left and right, sit to stand, chair/bed transfer, walk 10 feet     has a past medical history of Anesthesia, Arthritis, Hyperlipidemia, Hypertension, Lumbar herniated disc, Lumbar spondylosis, Lumbar stenosis, Prediabetes, Radiculopathy of lumbar region, Scoliosis, and Thyroid disease.     has a past surgical history that includes Hysterectomy; Cholecystectomy; bladder repair; Colonoscopy; lumbar discectomy (08/22/2011); Total knee arthroplasty (Right, 12/14/2023); Coronary angioplasty; and Total knee arthroplasty (Right, 12/14/2023).     reports that she has never smoked. She has never  urinary incontinence, decreased urine volume, and hematuria.   HEMATOLOGIC/LYMPHATIC: negative for easy bruising, bleeding and lymphadenopathy.   ALLERGIC/IMMUNOLOGIC: negative for recurrent infections, angioedema, anaphylaxis and drug reactions.   ENDOCRINE: negative for weight changes and diabetic symptoms including polyuria, polydipsia and polyphagia.   MUSCULOSKELETAL: negative for pain, joint swelling, decreased range of motion and muscle weakness.   NEUROLOGICAL: negative for headaches, slurred speech, unilateral weakness.   PSYCHIATRIC/BEHAVIORAL: negative for hallucinations, behavioral problems, confusion and agitation.     All pertinent positives are noted in the HPI.    Physical Examination:  Vitals: Patient Vitals for the past 24 hrs:   Temp Temp src Pulse Resp SpO2   12/24/24 1447 97.5 °F (36.4 °C) Oral 97 17 95 %     Psych: Stable mood, normal judgement, normal affect   Const: No distress  Eyes: Conjunctiva noninjected, no icterus noted; pupils equal, round, and reactive to light.   HENT: Atraumatic, normocephalic; Oral mucosa dry  Neck: Trachea midline, neck supple. No thyromegaly noted.  CV: Regular rate and rhythm, no murmur rub or gallop noted  Resp: Lungs clear to auscultation bilaterally, no rales wheezes or ronchi, no retractions. Respirations unlabored.   GI: Soft, nontender, nondistended. Normal bowel sounds.   Neuro: Alert, oriented, appropriate. No cranial nerve deficits appreciated. Sensation intact to light touch, except right knee and bottom of feet. Motor examination reveals normal diffuse weakness, decreased strength with right HF and KE.   Skin: Normal temperature and turgor  MSK: No joint abnormalities noted.     Ext: No significant edema appreciated. No varicosities.    Lab Results   Component Value Date    WBC 10.2 12/16/2023    HGB 10.2 (L) 12/16/2023    HCT 30.6 (L) 12/16/2023    MCV 96.7 12/16/2023     12/16/2023     Lab Results   Component Value Date    INR 0.96 12/14/2023     PROTIME 12.8 12/14/2023     Lab Results   Component Value Date    CREATININE 0.6 12/15/2023    BUN 17 12/15/2023     12/15/2023    K 3.7 12/15/2023     12/15/2023    CO2 25 12/15/2023     No results found for: \"ALT\", \"AST\", \"GGT\", \"ALKPHOS\", \"BILITOT\"    Most recent echocardiogram revealed 55-60%, grade 1 diastolic dysfunction.    Most recent EKG revealed sinus rhythm with premature atrial complexes.     IMAGING    MRI Head WO contrast 12/21/24  1.  Significant motion limited exam without evidence of acute abnormality.   2.  Redemonstrated thin bilateral subdural fluid collections and trace subdural hemorrhage along the left tentorium.   3.  Mild chronic small vessel disease and small chronic infarct right posterior corona radiata.     The above laboratory data have been reviewed.   The above imaging data have been reviewed.   The above medical testing have been reviewed.     There is no height or weight on file to calculate BMI.    Barriers to Discharge: level of assistance, endurance, pain, comorbidities    POST ADMISSION PHYSICIAN EVALUATION  The patient has agreed to being admitted to our comprehensive inpatient rehabilitation facility consisting of at least 180 minutes of therapy a day, 5 out of 7 days a week.     The patient/family has a good understanding of our discharge process. The patient has potential to make improvement and is in need of at least two of the following multidisciplinary therapies including but not limited to physical, occupational, respiratory, and speech, nutritional services, wound care, and prosthetics and orthotics. Given the patients complex condition and risk of further medical complications, rehabilitation services cannot be safely provided at a lower level of care such as a skilled nursing facility. I have compared the patients medical and functional status at the time of the preadmission screening and the same on this date, and there are no significant changes.

## 2024-12-25 LAB
ANION GAP SERPL CALCULATED.3IONS-SCNC: 10 MMOL/L (ref 3–16)
BASOPHILS # BLD: 0.1 K/UL (ref 0–0.2)
BASOPHILS NFR BLD: 0.8 %
BUN SERPL-MCNC: 17 MG/DL (ref 7–20)
C DIFF TOX A+B STL QL IA: ABNORMAL
CALCIUM SERPL-MCNC: 9 MG/DL (ref 8.3–10.6)
CHLORIDE SERPL-SCNC: 105 MMOL/L (ref 99–110)
CO2 SERPL-SCNC: 24 MMOL/L (ref 21–32)
CREAT SERPL-MCNC: 0.5 MG/DL (ref 0.6–1.2)
DEPRECATED RDW RBC AUTO: 17.9 % (ref 12.4–15.4)
EOSINOPHIL # BLD: 0.1 K/UL (ref 0–0.6)
EOSINOPHIL NFR BLD: 0.9 %
GFR SERPLBLD CREATININE-BSD FMLA CKD-EPI: >90 ML/MIN/{1.73_M2}
GLUCOSE SERPL-MCNC: 103 MG/DL (ref 70–99)
HCT VFR BLD AUTO: 37 % (ref 36–48)
HGB BLD-MCNC: 12.1 G/DL (ref 12–16)
LYMPHOCYTES # BLD: 2 K/UL (ref 1–5.1)
LYMPHOCYTES NFR BLD: 15.5 %
MCH RBC QN AUTO: 26.9 PG (ref 26–34)
MCHC RBC AUTO-ENTMCNC: 32.6 G/DL (ref 31–36)
MCV RBC AUTO: 82.4 FL (ref 80–100)
MONOCYTES # BLD: 0.8 K/UL (ref 0–1.3)
MONOCYTES NFR BLD: 6 %
NEUTROPHILS # BLD: 9.8 K/UL (ref 1.7–7.7)
NEUTROPHILS NFR BLD: 76.8 %
PLATELET # BLD AUTO: 258 K/UL (ref 135–450)
PMV BLD AUTO: 8.4 FL (ref 5–10.5)
POTASSIUM SERPL-SCNC: 3.7 MMOL/L (ref 3.5–5.1)
RBC # BLD AUTO: 4.5 M/UL (ref 4–5.2)
SODIUM SERPL-SCNC: 139 MMOL/L (ref 136–145)
WBC # BLD AUTO: 12.7 K/UL (ref 4–11)

## 2024-12-25 PROCEDURE — 87449 NOS EACH ORGANISM AG IA: CPT

## 2024-12-25 PROCEDURE — 97167 OT EVAL HIGH COMPLEX 60 MIN: CPT

## 2024-12-25 PROCEDURE — 97530 THERAPEUTIC ACTIVITIES: CPT

## 2024-12-25 PROCEDURE — 97535 SELF CARE MNGMENT TRAINING: CPT

## 2024-12-25 PROCEDURE — 92610 EVALUATE SWALLOWING FUNCTION: CPT

## 2024-12-25 PROCEDURE — 6360000002 HC RX W HCPCS: Performed by: STUDENT IN AN ORGANIZED HEALTH CARE EDUCATION/TRAINING PROGRAM

## 2024-12-25 PROCEDURE — 85025 COMPLETE CBC W/AUTO DIFF WBC: CPT

## 2024-12-25 PROCEDURE — 92526 ORAL FUNCTION THERAPY: CPT

## 2024-12-25 PROCEDURE — 80048 BASIC METABOLIC PNL TOTAL CA: CPT

## 2024-12-25 PROCEDURE — 6370000000 HC RX 637 (ALT 250 FOR IP): Performed by: STUDENT IN AN ORGANIZED HEALTH CARE EDUCATION/TRAINING PROGRAM

## 2024-12-25 PROCEDURE — 1280000000 HC REHAB R&B

## 2024-12-25 PROCEDURE — 87324 CLOSTRIDIUM AG IA: CPT

## 2024-12-25 PROCEDURE — 97116 GAIT TRAINING THERAPY: CPT

## 2024-12-25 PROCEDURE — 96125 COGNITIVE TEST BY HC PRO: CPT

## 2024-12-25 PROCEDURE — 97163 PT EVAL HIGH COMPLEX 45 MIN: CPT

## 2024-12-25 PROCEDURE — 36415 COLL VENOUS BLD VENIPUNCTURE: CPT

## 2024-12-25 RX ADMIN — ATORVASTATIN CALCIUM 10 MG: 10 TABLET, FILM COATED ORAL at 08:57

## 2024-12-25 RX ADMIN — SENNOSIDES AND DOCUSATE SODIUM 1 TABLET: 50; 8.6 TABLET ORAL at 20:47

## 2024-12-25 RX ADMIN — Medication 5 MG: at 20:48

## 2024-12-25 RX ADMIN — HEPARIN SODIUM 5000 UNITS: 5000 INJECTION INTRAVENOUS; SUBCUTANEOUS at 20:48

## 2024-12-25 RX ADMIN — Medication 1 TABLET: at 08:57

## 2024-12-25 RX ADMIN — GABAPENTIN 200 MG: 100 CAPSULE ORAL at 20:47

## 2024-12-25 RX ADMIN — GABAPENTIN 200 MG: 100 CAPSULE ORAL at 14:34

## 2024-12-25 RX ADMIN — METOPROLOL TARTRATE 25 MG: 25 TABLET, FILM COATED ORAL at 20:48

## 2024-12-25 RX ADMIN — METOPROLOL TARTRATE 25 MG: 25 TABLET, FILM COATED ORAL at 08:57

## 2024-12-25 RX ADMIN — LEVOTHYROXINE SODIUM 50 MCG: 0.05 TABLET ORAL at 05:55

## 2024-12-25 RX ADMIN — CETIRIZINE HYDROCHLORIDE 5 MG: 5 TABLET ORAL at 08:57

## 2024-12-25 RX ADMIN — LEVETIRACETAM 250 MG: 250 TABLET, FILM COATED ORAL at 08:57

## 2024-12-25 RX ADMIN — FLUTICASONE PROPIONATE 1 SPRAY: 50 SPRAY, METERED NASAL at 20:48

## 2024-12-25 RX ADMIN — GABAPENTIN 200 MG: 100 CAPSULE ORAL at 08:57

## 2024-12-25 RX ADMIN — HEPARIN SODIUM 5000 UNITS: 5000 INJECTION INTRAVENOUS; SUBCUTANEOUS at 14:34

## 2024-12-25 RX ADMIN — LEVETIRACETAM 250 MG: 250 TABLET, FILM COATED ORAL at 20:47

## 2024-12-25 RX ADMIN — PANTOPRAZOLE SODIUM 40 MG: 40 TABLET, DELAYED RELEASE ORAL at 05:55

## 2024-12-25 RX ADMIN — ACETAMINOPHEN 650 MG: 325 TABLET ORAL at 11:35

## 2024-12-25 RX ADMIN — Medication 1 TABLET: at 20:47

## 2024-12-25 RX ADMIN — ACETAMINOPHEN 650 MG: 325 TABLET ORAL at 18:10

## 2024-12-25 RX ADMIN — HEPARIN SODIUM 5000 UNITS: 5000 INJECTION INTRAVENOUS; SUBCUTANEOUS at 05:55

## 2024-12-25 RX ADMIN — QUETIAPINE FUMARATE 25 MG: 25 TABLET ORAL at 20:48

## 2024-12-25 ASSESSMENT — PAIN SCALES - GENERAL
PAINLEVEL_OUTOF10: 5
PAINLEVEL_OUTOF10: 4
PAINLEVEL_OUTOF10: 2

## 2024-12-25 ASSESSMENT — PAIN DESCRIPTION - ORIENTATION: ORIENTATION: UPPER

## 2024-12-25 ASSESSMENT — PAIN DESCRIPTION - DESCRIPTORS
DESCRIPTORS: ACHING
DESCRIPTORS: ACHING
DESCRIPTORS: CRAMPING;ACHING;DISCOMFORT

## 2024-12-25 ASSESSMENT — PAIN DESCRIPTION - LOCATION
LOCATION: HEAD
LOCATION: ABDOMEN
LOCATION: HEAD

## 2024-12-25 NOTE — PROGRESS NOTES
Occupational Therapy  Facility/Department: [facility]  Rehabilitation Occupational Therapy Evaluation and treatment       Date: 24  Patient Name: Fabiana Ceballos       Room: 0155/0155-01  MRN: [id number]  Account: [id number]   : 1945  (79 y.o.) Gender: female     Referring Practitioner: Monica Colindres MD  Diagnosis: SDH       Restrictions  Restrictions/Precautions: Modified Diet;Fall Risk;Contact Precautions;General Precautions  Other Position/Activity Restrictions: TLSO, soft and bite sized, recent back surgery 2024  Required Braces or Orthoses  Spinal: Thoracic Lumbar Sacral Orthotics  Spinal Other: when OOB, may don at EOB  Required Braces or Orthoses?: Yes  Spinal Px: no bending, lifting or twisting until 2025 per family    Subjective  Subjective: pt in bed at OT arrival. Agreeable to eval. /66, . Pain: \"in my head\" but has difficulty rating d/t processing          Vitals  Pulse: (!) 104  Respirations: 18  BP: 133/66  Oxygen Therapy  SpO2: 96 %  O2 Device: None (Room air)       Objective     Cognition  Overall Cognitive Status: Exceptions  Arousal/Alertness: Delayed responses to stimuli  Following Commands: Follows one step commands with increased time  Attention Span: Attends with cues to redirect;Difficulty attending to directions;Difficulty dividing attention  Memory: Impaired;Decreased short term memory  Safety Judgement: Decreased awareness of need for assistance;Decreased awareness of need for safety  Insights: Impaired  Initiation: Impaired  Sequencing: Impaired  Orientation  Overall Orientation Status: Within Normal Limits  Orientation Level: Oriented to place;Oriented to situation;Oriented to person;Disoriented to time   Sensation  Overall Sensation Status: Impaired (numbness bottom of B feet)   ROM  LUE Strength  Gross LUE Strength: Exceptions to WFL  L Shoulder Flex: 3-/5  L Elbow Flex: 4/5  RUE Strength  Gross RUE Strength: Exceptions to WFL  R Shoulder Flex:  3-/5  R Elbow Flex: 4/5  Fine Motor Skills  Coordination  Movements Are Fluid And Coordinated: No  Coordination and Movement Description: Fine motor impairments;Right UE;Decreased speed   9 Hole Peg Test  The Nine-Hole Peg Test (9-HPT) is a standardized, quantitative assessment used to measure finger dexterity.  The Nine-Hole Peg Test is administered by asking the client to take the pegs from a container, one by one, and place them into holes on the board as quickly as possible. Participants must then remove the pegs from the holes, one by one, and replace them back into the container. Scores are based on the time taken to complete the activity, recorded in seconds.    Patient score: RUE, 65 seconds sec vs norm Ages 71+: 22.49 sec   LUE, Patient score: 67 seconds sec vs norm Ages 71+: 24.11 sec     Functional Mobility  Balance  Sitting Balance: Stand by assistance  Standing Balance: Contact guard assistance  Transfers  Sit to stand: Moderate assistance (varies min A -mod A)  Stand to sit: Contact guard assistance  Toilet Transfers  Toilet - Technique: Ambulating  Equipment Used: Standard toilet  Toilet Transfer: Maximum assistance  Shower Transfers  Shower - Transfer To: Transfer tub bench  Shower Transfers: Minimal assistance  Social/Functional History  Lives With: Alone  Type of Home: House  Home Layout: Multi-level;Bed/Bath upstairs;1/2 bath on main level  Home Access: Ramped entrance  Entrance Stairs - Number of Steps: pt unsure how many stairs to enter  Bathroom Shower/Tub: Tub/Shower unit  Bathroom Toilet: Standard  Home Equipment: Cane;Walker - Rolling  Has the patient had two or more falls in the past year or any fall with injury in the past year?: Yes  Prior Level of Assist for ADLs: Independent  Prior Level of Assist for Homemaking: Independent  Prior Level of Assist for Ambulation: Independent household ambulator, with or without device (with RW)  Prior Level of Assist for Transfers: Independent  Active  bathing, mod A for UB dressing, total A for LB dressing, total A for footwear, total A for toileting, and max A for toilet transfer. Pt ambulates to bathroom with RW and CGA. Pt incontinent of bowel and bladder during session. After evaluation, pt found to be presenting with the above mentioned occupational performance deficits which are affecting participation in daily living skills. Pt would benefit from continued skilled occupational therapy to address ADLs, functional mobility, and safety while in ARU.   Prognosis: Fair  Decision Making: High Complexity  Discharge Recommendations: 24 hour supervision or assist;Home with Home health OT  Occupational Therapy Plan  Times Per Week: 5-7x/wk  Current Treatment Recommendations: Strengthening;ROM;Balance training;Functional mobility training;Endurance training;Patient/Caregiver education & training;Safety education & training;Neuromuscular re-education;Equipment evaluation, education, & procurement;Self-Care / ADL;Home management training;Coordination training       Therapy Time   Individual Concurrent Group Co-treatment   Time In 0900         Time Out 1030         Minutes 90         Timed Code Treatment Minutes: 30 Minutes (60 min eval)        Bernardo Noriega, OT

## 2024-12-25 NOTE — PATIENT CARE CONFERENCE
[facility]  Inpatient Rehabilitation  Weekly Team Conference Note    Date: 2024  Patient Name: Fabiana Ceballos        MRN: [id number]    : 1945  (79 y.o.)  Gender: female   Referring Practitioner: Monica Colindres MD  Diagnosis: Fall, SDH s/p craniotomy with evacuation      Interventions to be utilized toward barriers to discharge, per discipline:  NURSING  Nursing observed barriers to dc: Confusion, Cognitive deficit, Impulsivity, Limited safety awareness, Decreased endurance, Incontinence of bowel, and Medication managment  Nursing interventions: Medication management, safety monitoring, assist with ADLs  Family Education: Yes  Fall Risk:  Yes    Stay with me?: Yes    PHYSICAL THERAPY  Physical therapy observed barriers to dc:    Baseline: Lives alone, multi-story home, ramped entrance, step into bathroom. Avery mobility and gait with RW   Current level: Pal to modA bed mobility, Pal t/f, Pal gait up to 35' with RW, Pal to modA 4 steps with HR, maxA w/c mobility   Barriers to DC: lives alone, falls, TLOS/spinal px, motor control, slow processing, decreased strength, balance, activity tolerance and safety   Needs in order to achieve dc home/next level of care: Will need to be SBA or better mobility and gait with RW, able to complete curb step to d/c home. Will likely need 24hrA. DME possibly manual w/c or transport chair dependent on progression of gait.      Physical therapy interventions:   Current Treatment Recommendations: Strengthening, ROM, Balance training, Functional mobility training, Transfer training, Endurance training, Wheelchair mobility training, Gait training, Stair training, Neuromuscular re-education, Manual, Cognitive reorientation, Home exercise program, Safety education & training, Patient/Caregiver education & training, Equipment evaluation, education, & procurement, Modalities, Positioning, Therapeutic activities    PT Goals:            Short Term  sleeping upon arrival, but easily aroused. Pt upright in bed. Pt denies difficulty swallowing. Pt not seen by SLP during IP admission per EMR despite currently being on a soft and bite sized diet. Oral [facility] exam indicated minimal left facial weakness. Weak cough. Pt seen with breakfast meal - soft solids, puree, and thin liquids via cup. Pt with adequate mastication. Mild-mod residue noted with soft solids (greatest on right side of oral cavity); pt benefited from cues for liquid wash to clear residue. Suspect timely swallow. 1x post-swallow coughing with thins. No other instances of overt s/s of aspiration - no additional coughing, throat clearing, or wet vocal quality. Regular solids not given during eval due to oral residue with soft solids. SLP edu pt re: safe swallow strategies - small bites/sips, slow rate, alternate liquids and solids, fully upright.lingual sweep. Pt verbalized understanding, but needs ongoing edu. SLP monitor for need for instrumental.     SLP recommends soft and bite sized solids, thin liquids, and meds PO as tolerated. SLP to follow for diet tolerance monitoring, therapeutic PO trials, safe swallow strategies, monitor for instrumental. Pt in agreement to participate in tx.     NUTRITION  Weight - Scale: 74.4 kg (164 lb) / Body mass index is 28.15 kg/m².  Diet Order: ADULT DIET; Dysphagia - Soft and Bite Sized    CASE MANAGEMENT  Assessment: Lives alone on main floor of multi-level house. Daughter Kirstin is willing to stay with patient after discharge to provide 24hr supervision/support if recommended. Daughter also considering hiring patient's cousin who works as a CNA and is agreeable to provide additional support if/when needed. Patient was active with [facility] prior to arrival and would prefer to resume care through them after discharge - referral made and accepted. Will continue to follow DME recs and provide at discharge as appropriate.         Interdisciplinary Goals:    1.) Pt will carryover use of compensatory strategies for improved recall, safety, and insight across all disciplines given min cues   2.)Pt will perform toilet transfer with Min A    3.) Pt will complete functional t/f with LRAD with CGA  4.)  5.)      [x]  Family Training discussed at conference and to be scheduled. To be scheduled the week of January 6th.       Discharge Plan   Estimated discharge date: 1/10/24  Destination: home health  Pass: No  Services at Discharge: Home Health  Physical Therapy, Occupational Therapy, and Speech Therapy   Equipment at Discharge: TBD    Team Members Present at Conference:  : Rajat Rivera RN    Occupational Therapist: Bernardo Noriega OTR/L    Physical Therapist: Debora Jack PT  Speech Therapist: Winter Suh MA  CCC-SLP  Nurse: Rylan Alfaro RN BSN  Dietician: Jennifer Melendez RD, LD  Scribe:Eden Franco OTR/L  : ESTEFANIA Wu/L  Psychiatry: N/A    Family members present at conference: No      I led this team conference and I approve the established interdisciplinary plan of care as documented within the medical record of Fabiana Camarena MD: Electronically signed by Monica Colindres MD on 12/26/2024 at 11:46 AM

## 2024-12-25 NOTE — PROGRESS NOTES
Speech Language Pathology  Facility/Department: [facility]  Initial Speech/Language/Cognitive Assessment       NAME:Fabiana Ceballos  : 1945 (79 y.o.)   MRN: [id number]  ROOM: [id number]/[id number]  ADMISSION DATE: 2024  PATIENT DIAGNOSIS(ES): SDH (subdural hematoma) [S06.5XAA]  Patient Active Problem List    Diagnosis Date Noted    SDH (subdural hematoma) 2024    Coronary arteriosclerosis 12/15/2023    Diastolic dysfunction 12/15/2023    H/O total knee replacement, right 12/15/2023    Arthritis of right knee 2023    Unilateral primary osteoarthritis, right knee 2022    Right knee pain 2022    DDD (degenerative disc disease), lumbar 2011    Dyslipidemia 12/10/2007    Hypothyroid 12/10/2007    Hypertension 2007     Past Medical History:   Diagnosis Date    Anesthesia     PONV    Arthritis     Hyperlipidemia     Hypertension     Lumbar herniated disc     Lumbar spondylosis     Lumbar stenosis     L3 and L4    Prediabetes     Radiculopathy of lumbar region     Right L4    Scoliosis     Thyroid disease     hypothyroid     Past Surgical History:   Procedure Laterality Date    BLADDER REPAIR      CHOLECYSTECTOMY      COLONOSCOPY      CORONARY ANGIOPLASTY      HYSTERECTOMY (CERVIX STATUS UNKNOWN)      LUMBAR DISCECTOMY  2011    microlumber L4-5 left.    TOTAL KNEE ARTHROPLASTY Right 2023    TOTAL KNEE ARTHROPLASTY Right 2023    RIGHT TOTAL KNEE ARTHROPLASTY performed by Daniel Jimenez DO [place] [facility] [place]     Allergies   Allergen Reactions    Sulfa Antibiotics      Abdominal pain       Date of Evaluation: 2024   Evaluating Therapist: RAMANDEEP Paz    Subjective:   Chart Reviewed: : [x] Yes [] No    Onset Date: pt admitted to [facility] 24. Pt admitted to [facility] 24.     Recent Results of MRI:  Date: 24 at [facility]  IMPRESSION:   1.  Significant motion limited exam without evidence of acute abnormality.   2.  Redemonstrated thin bilateral subdural fluid  , and Severity of impairments   Individuals consulted and agree with results and recommendations: Patient , RN, and OT/PT  Safety Devices in place: Yes  Type of Devices: All fall risk precautions in place , Call light within reach, Bed alarm in place, and Left in bed    Recommendations:   Requires SLP Intervention: yes  Duration / Frequency of Treatment: 60 min; 5 days per week for 14 days    Therapeutic Interventions:  Compensatory strategy training and carryover, recall/STM, problem solving, reasoning, exec function, thought organization, attention.      Education:  Patient education: Role of SLP, Rationale of eval, Results of eval, Goals, and POC  Patient Education Responses: pt verbalized understanding and would benefit from ongoing education      Total Treatment Time / Charges       Time in Time out Total Time / units   Speech / Lang / Cog Eval:  0800 0830  30 min / 1 unit      Signature:  Winter Suh MA CCC-SLP #[id number]  Speech Language Pathologist

## 2024-12-25 NOTE — PROGRESS NOTES
Speech Language Pathology  Clinical Bedside Swallow Assessment  Facility/Department: [facility]        Recommendations:  Diet recommendation: IDDSI 6 Soft and Bite Sized Solids; IDDSI 0 Thin Liquids; Meds PO as tolerated  Instrumentation: Not clinically indicated at this time. Will continue to monitor  Risk management: upright for all intake, stay upright for at least 30 mins after intake, small bites/sips, close supervision, oral care 2-3x/day to reduce adverse affects in the event of aspiration, increase physical mobility as able, alternate bites/sips, slow rate of intake, general GERD precautions, general aspiration precautions, and hold PO and contact SLP if s/s of aspiration or worsening respiratory status develop.      NAME:Fabiana Ceballos  : 1945 (79 y.o.)   MRN: [id number]  ROOM: [id number]  ADMISSION DATE: 2024  PATIENT DIAGNOSIS(ES): SDH (subdural hematoma) [S06.5XAA]  No chief complaint on file.    Patient Active Problem List    Diagnosis Date Noted    SDH (subdural hematoma) 2024    Coronary arteriosclerosis 12/15/2023    Diastolic dysfunction 12/15/2023    H/O total knee replacement, right 12/15/2023    Arthritis of right knee 2023    Unilateral primary osteoarthritis, right knee 2022    Right knee pain 2022    DDD (degenerative disc disease), lumbar 2011    Dyslipidemia 12/10/2007    Hypothyroid 12/10/2007    Hypertension 2007     Past Medical History:   Diagnosis Date    Anesthesia     PONV    Arthritis     Hyperlipidemia     Hypertension     Lumbar herniated disc     Lumbar spondylosis     Lumbar stenosis     L3 and L4    Prediabetes     Radiculopathy of lumbar region     Right L4    Scoliosis     Thyroid disease     hypothyroid     Past Surgical History:   Procedure Laterality Date    BLADDER REPAIR      CHOLECYSTECTOMY      COLONOSCOPY      CORONARY ANGIOPLASTY      HYSTERECTOMY (CERVIX STATUS UNKNOWN)      LUMBAR DISCECTOMY  2011     microlumber L4-5 left.    TOTAL KNEE ARTHROPLASTY Right 12/14/2023    TOTAL KNEE ARTHROPLASTY Right 12/14/2023    RIGHT TOTAL KNEE ARTHROPLASTY performed by Daniel Jimenez DO at [facility] [place] [place]     Allergies   Allergen Reactions    Sulfa Antibiotics      Abdominal pain       DATE ONSET: pt admitted to [facility] 12/18/24. Pt admitted to [facility] ARU 12/24/24.     Date of Evaluation: 12/25/2024   Evaluating Therapist: Winter Suh, SLP    Chart Reviewed: : [x] Yes [] No     Pain: The patient does not complain of pain     Current Diet: ADULT DIET; Dysphagia - Soft and Bite Sized      Most Recent Imaging  Chest x-ray 12/20/24 at [facility]  IMPRESSION:   Mild perihilar opacities, atelectasis favored over pneumonia or sequelae of aspiration.     Reason for Referral  Fabiana Ceballos was referred for a bedside swallow evaluation to assess the efficiency of their swallow function, identify signs and symptoms of aspiration and make recommendations regarding safe dietary consistencies, effective compensatory strategies, and safe eating environment.    Assessment    Medical record review/interview: Per MD H&P on 12/24/24: \"Fabiana Ceballos is an 79 year old female with a past medical history significant for CAD s/p PCI, prior DVT (after TKA), HTN, HLD, T5-sacral fusion (8/2024) who presented to [facility] on 12/18/24 from clinic after a fall with headache and AMS. She was found to have left SDH. On 12/19/24 she underwent left craniotomy. Course notable for AMS. Precedex was weaned off and she is on seroquel qhs. She was admitted to [facility] on 12/24/24 due to functional deficits below her baseline. Today she is seen with her daughter present. She denies focal weakness due to SDH. She reports history of right TKA and some residual weakness and numbness around her knee due to that. She has a history of neuropathy and report numbness on the bottom of her feet bilaterally. Her daughter notes that she has been having frequent soft stools recently. She lives alone  at least 30 mins after intake, small bites/sips, close supervision, oral care 2-3x/day to reduce adverse affects in the event of aspiration, increase physical mobility as able, alternate bites/sips, slow rate of intake, general GERD precautions, general aspiration precautions, and hold PO and contact SLP if s/s of aspiration or worsening respiratory status develop.    Prognosis: Fair - Good    Recommended Intervention:    [x] Dysphagia tx  [] Videostroboscopy                      [] NPO   [] MBS       [] Speech/Cog Eval    [x] Therapeutic PO Trials     [] Ice Chips   [] Other:  [] FEES                                                 Dysphagia Therapeutic Intervention:   []  Bolus control Exercises  []  Oral Motor Exercises  []  Gaytan Water Protocol  []  Thermal Stimulation  [x]  Oral Care    []  Vital Stim/NMES  []  Laryngeal Exercises  [x]  Patient/Family Education  []  Pharyngeal Exercises  [x]  Therapeutic PO trials with SLP  [x]  Diet tolerance monitoring  []  Other:     Referrals:   [] ENT    []   [] Pulmonology [] GI  [] Neurology  [] RD  [] OT/ PT  [x] N/A    Goals:  Short Term Goals:  Timeframe for Short Term Goals: (10 Days (01/02/25)  Goal 1: The patient will tolerate recommended diet with no clinical s/s of aspiration 5/5  Goal 2: The patient/caregiver will demonstrate understanding of compensatory swallow strategies, for improved swallow safety    Long Term Goals:   Timeframe for Long Term Goals: 14 Days (01/06/25)   Goal 1: The patient will tolerate least restrictive diet with no clinical s/s of aspiration or worsening respiratory/pulmonary status    Treatment:  Skilled instruction completed with patient re: evidenced based practice regarding recommendations and POC, importance of oral care to reduce adverse affects in the event of aspiration, and instruction of recommended compensatory strategies developed based upon clinical exam. Pt able to recall/demonstrate compensatory strategies

## 2024-12-25 NOTE — PLAN OF CARE
SLP completed evaluation. Please refer to notes in EMR.      Winter Suh MA CCC-SLP #[id number]  Speech Language Pathologist

## 2024-12-25 NOTE — PROGRESS NOTES
Physical Therapy  Facility/Department: [facility]  Rehabilitation Physical Therapy Initial Assessment/Treatment    NAME: Fabiana Ceballos  : 1945 (79 y.o.)  MRN: [id number]  CODE STATUS: Full Code    Date of Service: 24      Past Medical History:   Diagnosis Date    Anesthesia     PONV    Arthritis     Hyperlipidemia     Hypertension     Lumbar herniated disc     Lumbar spondylosis     Lumbar stenosis     L3 and L4    Prediabetes     Radiculopathy of lumbar region     Right L4    Scoliosis     Thyroid disease     hypothyroid     Past Surgical History:   Procedure Laterality Date    BLADDER REPAIR      CHOLECYSTECTOMY      COLONOSCOPY      CORONARY ANGIOPLASTY      HYSTERECTOMY (CERVIX STATUS UNKNOWN)      LUMBAR DISCECTOMY  2011    microlumber L4-5 left.    TOTAL KNEE ARTHROPLASTY Right 2023    TOTAL KNEE ARTHROPLASTY Right 2023    RIGHT TOTAL KNEE ARTHROPLASTY performed by Daniel Jimenez DO at [facility] [place]       Chart Reviewed: Yes  Patient assessed for rehabilitation services?: Yes  Additional Pertinent Hx: Per Dr. Colindres H&P \"79 year old female with a past medical history significant for CAD s/p PCI, prior DVT (after TKA), HTN, HLD, T5-sacral fusion (2024) who presented to [facility] on 24 from clinic after a fall with headache and AMS. She was found to have left SDH. On 24 she underwent left craniotomy. Course notable for AMS. Precedex was weaned off and she is on seroquel qhs\"  Referring Practitioner: Monica Colindres MD  Referral Date : 24  Diagnosis: Fall, SDH s/p craniotomy with evacuation  Other (Comment): slow processing and motor planning  General  General Comments: RN cleared pt for session    Restrictions:  Restrictions/Precautions: Modified Diet;Fall Risk;Contact Precautions;General Precautions  Required Braces or Orthoses  Spinal: Thoracic Lumbar Sacral Orthotics  Spinal Other: when OOB, may don at EOB  Position Activity Restriction  Spinal Precautions:  PLOF/social hx clarified with family at end of session. Pt lives alone in multi-level home with ramped entrance and is able to stay on first floor with one step into bathroom. Pt is typically Bekah with mobility and gait with use of RW. Pt is currently functioning below her baseline requiring Pal to modA for bed mobility, Pal for t/f, Pal for gait up to ~35' with RW and Pal to modA for stairs with HR. Unable to formally assess proprioception and sensation d/t cognition and communication but pt presents wtih decreased strength, balance, activity tolerance, motor control and coordiatino and decreased cognition and safety. Pt will benefit from continued skilled PT in ARU to address above deficits. Recommend pt d/c home with 24hrA and HHPT. CTA DME needs, pt has RW, may benefit from manual w/c or transport chair depending on progresion of gait.    GOALS  Patient Goals   Patient Goals : \"get out of here\"  Short Term Goals  Time Frame for Short Term Goals: 1 week 12/30/2024  Short Term Goal 1: Pt will complete bed mobility with CGA  Short Term Goal 2: Pt will complete functional t/f with LRAD with SBA  Short Term Goal 3: Pt will ambulate >50' with LRAD wtih SBA without LOB  Short Term Goal 4: Pt will ascend/descend curb step with LRAD with CGA  Short Term Goal 5: Pt will tolerate formal balance assessment and appropriate goal to be set  Long Term Goals  Time Frame for Long Term Goals : 14 days 1/06/2025  Long Term Goal 1: Pt will complete bed mobility with Bekah  Long Term Goal 2: Pt will complete STS with LRAD with Bekah  Long Term Goal 3: Pt will complete functional t/f with LRAD with Sup  Long Term Goal 4: Pt will ambulate >150' with LRAD with SUP without LOB  Long Term Goal 5: Pt will ascend/descend curb step with LRAD with SUP    PLAN OF CARE  Frequency: 1-2 treatment sessions per day, 5-7 days per week  Physical Therapy Plan  General Plan: 5-7 times per week  Specific Instructions for Next Treatment: Progress

## 2024-12-25 NOTE — PLAN OF CARE
with no clinical s/s of aspiration 5/5  Goal 2: The patient/caregiver will demonstrate understanding of compensatory swallow strategies, for improved swallow safety     Cognitive-linguistic Goals:   Long Term Goals:   Time Frame for Long Term Goals: 14 Days (01/06/25)  Goal 1: Pt will improve cognitive-linguistic abilities to promote safe and independent return home PLOF                Short Term Goals  Time Frame for Short Term Goals: 10 Days (01/02/25)  Goal 1: Pt will  complete graded recall  tasks using compensatory strategies with 80% acc given min cues  Goal 2: Pt will complete problem solving and thought organization tasks with 80% acc given min cues  Goal 3: Pt will complete ongoing cognitive assessment and goals / POC will be updated as indicated              Time Frame for Short Term Goals: 10 Days (01/02/25)  These goals were reviewed with this patient at the time of assessment and Fabiana Ceballos is in agreement    Plan of Care: Pt to be seen 5 out of 7 days per week, 60   mins (exact) per day for 14 days (exact)             Dysphagia:  Diet tolerance monitoring, safe swallow strategies, therapeutic PO trials with SLP, swallow strengthening exercises            Speech/Language/Cognition:Compensatory strategy training and carryover, recall/STM, problem solving, reasoning, exec function, thought organization, attention.          CASE MANAGEMENT:  Goals:   Assist patient/family with discharge planning, patient/family counseling,   and coordination with insurance during ARU stay.    QIM / IRF LAST SCORES:  ITEM CURRENT SCORE GOAL   Eating CARE Score: 5 Discharge Goal: Independent   Oral Hygiene CARE Score: 88 Discharge Goal: Independent   Toileting Hygiene CARE Score: 1 Discharge Goal: Independent   Shower/Bathe Self CARE Score: 88 Discharge Goal: Independent   Upper Body Dressing CARE Score: 3 Discharge Goal: Independent   Lower Body Dressing CARE Score: 1 Discharge Goal: Independent   On/Off Footwear CARE  Score: 1 Discharge Goal: Independent   Roll Left & Right CARE Score: 3 Discharge Goal: Independent   Sit to Lying  CARE Score: 3 Discharge Goal: Independent   Lying to Sitting EOB CARE Score: 3 Discharge Goal: Independent   Sit to Stand CARE Score: 3 Discharge Goal: Independent   Chair/Bed to Chair Transfer CARE Score: 3 Discharge Goal: Supervision or touching assistance   Toilet Transfer CARE Score: 2 Discharge Goal: Independent   Car Transfer CARE Score: 3 Discharge Goal: Supervision or touching assistance   Walk 10 Feet CARE Score: 3 Discharge Goal: Supervision or touching assistance   Walk 50 Feet, 2 Turns CARE Score: 88 Discharge Goal: Supervision or touching assistance   Walk 150 Feet CARE Score: 88 Discharge Goal: Supervision or touching assistance   Walk 10 Feet, Uneven Surface CARE Score: 3 Discharge Goal: Supervision or touching assistance   1 Step (Curb) CARE Score: 3 Discharge Goal: Supervision or touching assistance   4 Steps CARE Score: 3 Discharge Goal: Supervision or touching assistance   12 Steps CARE Score: 88 Discharge Goal: Supervision or touching assistance    Object CARE Score: 3 Discharge Goal: Independent   Wheel 50 feet, 2 turns CARE Score: 2 Discharge Goal: Independent   Wheel 150 Feet CARE Score: 2 Discharge Goal: Independent          Fabiana Ceballos will be seen a minimum of 3 hours of therapy per day, a minimum of 5 out of 7 days per week.    [] In this rare instance due to the nature of this patient's medical involvement, this patient will be seen 15 hours per week (900 minutes within a 7 day period).    Treatments may include therapeutic exercises, gait training, neuromuscular re-ed, transfer training, community reintegration, bed mobility, w/c mobility and training, self care, home mgmt, cognitive training, energy conservation,dysphagia tx, speech/language/communication therapy, group therapy, and patient/family education. In addition, dietician/nutritionist may monitor calorie  count as well as intake and collaboratively work with SLP on dietary upgrades.  Neuropsychology/Psychology may evaluate and provide necessary support.    Medical issues being managed closely and that require 24 hour availability of a physician:   [x] Swallowing Precautions  [x] Bowel/Bladder Fx  [] Weight bearing precautions   [] Wound Care    [x] Pain Mgmt   [x] Infection Protection   [x] DVT Prophylaxis   [x] Fall Precautions  [x] Fluid/Electrolyte/Nutrition Balance   [] Voice Protection   [] Respiratory  [] Other:    Medical Prognosis: [x] Good  [] Fair    [] Guarded   Total expected IRF days 14  Anticipated discharge destination:    [] Home Independently   [] Home Modified Independent  [x] Home with supervision    []SNF     [] Other                                           Physician anticipated functional outcomes:  Home w/ assist and home health services.    IPOC brief synthesis: Fabiana Ceballos is an 79 year old female with a past medical history significant for CAD s/p PCI, prior DVT (after TKA), HTN, HLD, T5-sacral fusion (8/2024) who presented to [facility] on 12/18/24 from clinic after a fall with headache and AMS. She was found to have left SDH s/p left craniotomy. She was admitted to [facility] on 12/24/24 due to functional deficits below her baseline.     This plan has been reviewed with Fabiana Ceballos on 12/25 in a language the patient understands.  Fabiana Ceballos has had the opportunity to include input with the therapy team.      I have reviewed this initial plan of care and agree with its contents:    Title   Name    Date    Time    Physician: Monica Colindres MD    Case Mgmt: Rajat Rivera RN    OT: Bernardo Noriega, OTR/L      PT: Penny Mcpherson PT, DPT NCS    RN: Rylan Alfaro RN    ST: Winter Suh MA  CCC-SLP    : Eden Franco OTR/L    Other:

## 2024-12-26 PROCEDURE — 6370000000 HC RX 637 (ALT 250 FOR IP): Performed by: STUDENT IN AN ORGANIZED HEALTH CARE EDUCATION/TRAINING PROGRAM

## 2024-12-26 PROCEDURE — 92526 ORAL FUNCTION THERAPY: CPT

## 2024-12-26 PROCEDURE — 97535 SELF CARE MNGMENT TRAINING: CPT

## 2024-12-26 PROCEDURE — 97129 THER IVNTJ 1ST 15 MIN: CPT

## 2024-12-26 PROCEDURE — 97130 THER IVNTJ EA ADDL 15 MIN: CPT

## 2024-12-26 PROCEDURE — 97116 GAIT TRAINING THERAPY: CPT

## 2024-12-26 PROCEDURE — 6360000002 HC RX W HCPCS: Performed by: STUDENT IN AN ORGANIZED HEALTH CARE EDUCATION/TRAINING PROGRAM

## 2024-12-26 PROCEDURE — 97110 THERAPEUTIC EXERCISES: CPT

## 2024-12-26 PROCEDURE — 97530 THERAPEUTIC ACTIVITIES: CPT

## 2024-12-26 PROCEDURE — 1280000000 HC REHAB R&B

## 2024-12-26 RX ORDER — LISINOPRIL 10 MG/1
10 TABLET ORAL 2 TIMES DAILY
Status: DISCONTINUED | OUTPATIENT
Start: 2024-12-26 | End: 2025-01-04

## 2024-12-26 RX ADMIN — LISINOPRIL 10 MG: 10 TABLET ORAL at 20:33

## 2024-12-26 RX ADMIN — METOPROLOL TARTRATE 25 MG: 25 TABLET, FILM COATED ORAL at 08:46

## 2024-12-26 RX ADMIN — ATORVASTATIN CALCIUM 10 MG: 10 TABLET, FILM COATED ORAL at 08:46

## 2024-12-26 RX ADMIN — HEPARIN SODIUM 5000 UNITS: 5000 INJECTION INTRAVENOUS; SUBCUTANEOUS at 14:43

## 2024-12-26 RX ADMIN — LEVETIRACETAM 250 MG: 250 TABLET, FILM COATED ORAL at 20:33

## 2024-12-26 RX ADMIN — GABAPENTIN 200 MG: 100 CAPSULE ORAL at 08:45

## 2024-12-26 RX ADMIN — FIDAXOMICIN 200 MG: 200 TABLET, FILM COATED ORAL at 12:16

## 2024-12-26 RX ADMIN — HEPARIN SODIUM 5000 UNITS: 5000 INJECTION INTRAVENOUS; SUBCUTANEOUS at 20:34

## 2024-12-26 RX ADMIN — HEPARIN SODIUM 5000 UNITS: 5000 INJECTION INTRAVENOUS; SUBCUTANEOUS at 08:46

## 2024-12-26 RX ADMIN — Medication 1 TABLET: at 08:45

## 2024-12-26 RX ADMIN — Medication 1 TABLET: at 20:33

## 2024-12-26 RX ADMIN — ACETAMINOPHEN 650 MG: 325 TABLET ORAL at 16:11

## 2024-12-26 RX ADMIN — FIDAXOMICIN 200 MG: 200 TABLET, FILM COATED ORAL at 20:33

## 2024-12-26 RX ADMIN — QUETIAPINE FUMARATE 25 MG: 25 TABLET ORAL at 20:33

## 2024-12-26 RX ADMIN — LEVOTHYROXINE SODIUM 50 MCG: 0.05 TABLET ORAL at 08:46

## 2024-12-26 RX ADMIN — LEVETIRACETAM 250 MG: 250 TABLET, FILM COATED ORAL at 08:46

## 2024-12-26 RX ADMIN — FLUTICASONE PROPIONATE 1 SPRAY: 50 SPRAY, METERED NASAL at 20:34

## 2024-12-26 RX ADMIN — GABAPENTIN 200 MG: 100 CAPSULE ORAL at 14:43

## 2024-12-26 RX ADMIN — LISINOPRIL 10 MG: 10 TABLET ORAL at 12:16

## 2024-12-26 RX ADMIN — GABAPENTIN 200 MG: 100 CAPSULE ORAL at 20:33

## 2024-12-26 RX ADMIN — PANTOPRAZOLE SODIUM 40 MG: 40 TABLET, DELAYED RELEASE ORAL at 08:46

## 2024-12-26 RX ADMIN — CETIRIZINE HYDROCHLORIDE 5 MG: 5 TABLET ORAL at 08:45

## 2024-12-26 RX ADMIN — Medication 5 MG: at 20:33

## 2024-12-26 RX ADMIN — METOPROLOL TARTRATE 25 MG: 25 TABLET, FILM COATED ORAL at 20:33

## 2024-12-26 ASSESSMENT — ENCOUNTER SYMPTOMS
EYES NEGATIVE: 1
ALLERGIC/IMMUNOLOGIC NEGATIVE: 1
DIARRHEA: 1
RESPIRATORY NEGATIVE: 1

## 2024-12-26 ASSESSMENT — PAIN SCALES - GENERAL
PAINLEVEL_OUTOF10: 2
PAINLEVEL_OUTOF10: 0
PAINLEVEL_OUTOF10: 3

## 2024-12-26 ASSESSMENT — PAIN DESCRIPTION - ORIENTATION: ORIENTATION: RIGHT

## 2024-12-26 ASSESSMENT — PAIN DESCRIPTION - DESCRIPTORS
DESCRIPTORS: ACHING
DESCRIPTORS: ACHING

## 2024-12-26 ASSESSMENT — PAIN DESCRIPTION - LOCATION
LOCATION: HEAD
LOCATION: HEAD

## 2024-12-26 NOTE — PROGRESS NOTES
Physical Therapy  Facility/Department: [facility]  Rehabilitation Physical Therapy Treatment Note    NAME: Fabiana Ceballos  : 1945 (79 y.o.)  MRN: [id number]  CODE STATUS: Full Code    Date of Service: 24       Restrictions:  Restrictions/Precautions: Modified Diet, Fall Risk, Contact Precautions, General Precautions, ROM Restrictions  Required Braces or Orthoses  Spinal: Thoracic Lumbar Sacral Orthotics  Spinal Other: when OOB, may don at EOB  Position Activity Restriction  Spinal Precautions: No Lifting, No Twisting, No Bending  Spinal Precautions Comments: Per pt/family pt with spinal px and to wear TLSO until mid february  Other Position/Activity Restrictions: TLSO, soft and bite sized, recent back surgery 2024, L scalp incision with staples     SUBJECTIVE  Subjective: Patient asleep upon arrival, agreeable to PT session.  Pain: Reports low back discomfort with bed mobility, unable to rate       OBJECTIVE  Cognition  Overall Cognitive Status: Exceptions  Arousal/Alertness: Delayed responses to stimuli  Following Commands: Follows one step commands with increased time  Attention Span: Attends with cues to redirect;Difficulty attending to directions;Difficulty dividing attention  Memory: Impaired;Decreased short term memory  Safety Judgement: Decreased awareness of need for assistance;Decreased awareness of need for safety  Problem Solving: Assistance required to correct errors made;Assistance required to identify errors made;Assistance required to implement solutions;Assistance required to generate solutions  Insights: Decreased awareness of deficits  Initiation: Requires cues for some  Sequencing: Requires cues for some  Orientation  Overall Orientation Status: Impaired  Orientation Level: Oriented to situation;Oriented to person;Oriented to time;Disoriented to place    Functional Mobility  Roll Left  Assistance Level: Contact guard assist  Skilled Clinical Factors: bedrail  Roll Right  Assistance  incontinent at start of session, able to ambulate with assist using RW to bathroom for further toileting. Used wheelchair to transport to therapy gym for LE strengthening exercises, then attempted longer walk from gym using RW and daughter providing wheelchair follow. Tolerated 40ft with cues for upright posture and assist with walker management. Will continue with skilled PT services per POC to promote activity tolerance, general strength, and safety awareness needed to maximize function and reduce risk for falls.  Activity Tolerance: Patient limited by fatigue;Patient limited by endurance;Treatment limited secondary to decreased cognition  Discharge Recommendations: 24 hour supervision or assist;Home with Home health PT  PT Equipment Recommendations  Equipment Needed: No  Other: CTA may benefit from w/c or transport chair at d/c    Goals  Patient Goals   Patient Goals : \"get out of here\"  Short Term Goals  Time Frame for Short Term Goals: 1 week 12/30/2024  Short Term Goal 1: Pt will complete bed mobility with CGA  Short Term Goal 2: Pt will complete functional t/f with LRAD with SBA  Short Term Goal 3: Pt will ambulate >50' with LRAD wtih SBA without LOB  Short Term Goal 4: Pt will ascend/descend curb step with LRAD with CGA  Short Term Goal 5: Pt will tolerate formal balance assessment and appropriate goal to be set  Long Term Goals  Time Frame for Long Term Goals : 14 days 1/06/2025  Long Term Goal 1: Pt will complete bed mobility with Bekah  Long Term Goal 2: Pt will complete STS with LRAD with Bekah  Long Term Goal 3: Pt will complete functional t/f with LRAD with Sup  Long Term Goal 4: Pt will ambulate >150' with LRAD with SUP without LOB  Long Term Goal 5: Pt will ascend/descend curb step with LRAD with SUP    PLAN OF CARE/SAFETY  Physical Therapy Plan  General Plan: 5-7 times per week  Current Treatment Recommendations: Strengthening;ROM;Balance training;Functional mobility training;Transfer

## 2024-12-26 NOTE — PROGRESS NOTES
Comprehensive Nutrition Assessment    Type and Reason for Visit:  Initial, Consult    Nutrition Recommendations/Plan:   Continue soft and bite sized diet w/ thin liquids (textures and consistencies per SLP recommendations).   Add Ensure once/day (prefers strawberry but will accept vanilla).   Encourage PO intake as tolerated.   Monitor nutrition adequacy, pertinent labs, bowel habits, wt changes, and clinical progress      Malnutrition Assessment:  Malnutrition Status:  At risk for malnutrition (12/26/24 1253)    Context:  Acute Illness     Findings of the 6 clinical characteristics of malnutrition:  Energy Intake:  Mild decrease in energy intake  Weight Loss:  Unable to assess (limited recent hx and unspecified/\"stated\" collection methods)     Body Fat Loss:  Mild body fat loss (question natural aging versus malnutrition) Orbital   Muscle Mass Loss:  Mild muscle mass loss Temples (temporalis) (question natural aging versus malnutrition)  Fluid Accumulation:  No fluid accumulation     Strength:  Not Performed    Nutrition Assessment:    Pt and family members visited for initial + consult (oral nutrition supplements). New ARU pt. Admitted after a fall w/ headache and AMS but found to have a subdural hematoma. PMH of T5-sacral fusion (8/2024), CAD s/p PCI, HTN, and HLD. Pt is currently on soft and bite sized diet w/ thin liquids per SLP recommendations after bedside swallow eval on 12/25. Family members report that her appetite has been good recently and that she typically eats 50% of her meals. One PO intake documented this admit (51-75% of lunch today). Family member reports that pt has unintentional wt loss after T5-sacral fusion in August (amount unknown) from her reported UBW of 178 lbs. SAMMY accuracy d/t limited recent hx and unspecified/\"stated\" collection methods. Agreeable to trial Ensure once/day. Encouraged PO intake from meals before ONS - family members voiced understanding. Denies nausea, emesis, and

## 2024-12-26 NOTE — PROGRESS NOTES
Fabiana Ceballos  12/26/2024  [id number]    Chief Complaint: SDH (subdural hematoma)    Subjective:   No acute events overnight. Yesterday patient tested positive for c diff. Will start fidaxomicin. Today she is seen with OT. She appears fatigued and endorses feeling tired. Therapy notes that she was incontinent of stool.     Personally reviewed labs.     ROS: denies f/c, n/v, cp, sob    Objective:  Patient Vitals for the past 24 hrs:   BP Temp Temp src Pulse Resp SpO2 Height   12/26/24 0930 -- -- -- -- -- -- 1.626 m (5' 4.02\")   12/26/24 0907 (!) 148/74 -- -- 68 -- 98 % --   12/26/24 0830 (!) 169/100 99 °F (37.2 °C) Axillary 94 18 97 % --   12/25/24 2047 (!) 133/56 98.9 °F (37.2 °C) Oral 99 18 96 % --   12/25/24 1442 111/73 -- -- 96 -- 96 % --     Gen: No distress, pleasant.   HEENT: Normocephalic, atraumatic.   CV: extremities well perfused  Resp: No respiratory distress. On room air  Abd: Soft, nondistended   Ext: No edema.   Neuro: Alert, oriented, appropriately interactive.    Wt Readings from Last 3 Encounters:   12/24/24 74.4 kg (164 lb)   11/15/24 76.2 kg (168 lb)   06/21/24 83.9 kg (185 lb)       Laboratory data:   Lab Results   Component Value Date    WBC 12.7 (H) 12/25/2024    HGB 12.1 12/25/2024    HCT 37.0 12/25/2024    MCV 82.4 12/25/2024     12/25/2024       Lab Results   Component Value Date/Time     12/25/2024 06:22 AM    K 3.7 12/25/2024 06:22 AM     12/25/2024 06:22 AM    CO2 24 12/25/2024 06:22 AM    BUN 17 12/25/2024 06:22 AM    CREATININE 0.5 12/25/2024 06:22 AM    GLUCOSE 103 12/25/2024 06:22 AM    CALCIUM 9.0 12/25/2024 06:22 AM        Therapy progress:  Physical therapy:  Bed Mobility:     Sit>supine:     Supine>sit:     Transfers:     Sit>stand:     Stand>sit:     Bed<>chair     Stand Pivot:     Lateral transfer:     Car transfer:     Ambulation:     Stairs:     Curb:     Wheelchair:       Occupational therapy:   Feeding     Grooming/Oral Hygiene  Assistance Level: Contact  today with entire rehab treatment team including PT, OT, SLP (if applicable), Dietician, RN, and SW. Discussion focused on progress toward rehab goals and discharge planning. Making progress. Barriers include level of assistance, availability of assistance, endurance, comorbidities. We as a medical team, and I as the physician , made a plan to work on these barriers to facilitate safe discharge. Plan will be presented to patient/family (if available).     Monica Colindres MD 12/26/2024, 11:48 AM

## 2024-12-26 NOTE — PLAN OF CARE
Problem: Safety - Adult  Goal: Free from fall injury  12/26/2024 1012 by Rylan Alfaro, RN  Outcome: Progressing  12/25/2024 2209 by Ethel Roberts, RN  Outcome: Progressing

## 2024-12-26 NOTE — CONSULTS
Gastroenterology Consult Note    Patient:   Fabiana Ceballos   :    1945   Facility:     [facility]  [address]  [facility] [address]   Referring/PCP: Daniel Bustos PA-C  Date:     2024  Consultant:   Brandon Van DO    Subjective:     This 79 y.o. female was admitted 2024 with a diagnosis of \"SDH (subdural hematoma) [S06.5XAA]\" and is seen in consultation regarding c diff diarrhea    78 yo female with CAD s/p PCI, prior DVT (after TKA), HTN, HLD, T5-sacral fusion (2024) who presented to [facility] on 24 from clinic after a fall with headache and AMS.  She was found to have a left SDH and went a left craniotomy.  She has had diarrhea in rehab with leukocytosis aof 12.7 and positive c diff.  Has never had infection previously.  Renal function normal.  Started on dificid.    Past Medical History:   Diagnosis Date    Anesthesia     PONV    Arthritis     Hyperlipidemia     Hypertension     Lumbar herniated disc     Lumbar spondylosis     Lumbar stenosis     L3 and L4    Prediabetes     Radiculopathy of lumbar region     Right L4    Scoliosis     Thyroid disease     hypothyroid     Past Surgical History:   Procedure Laterality Date    BLADDER REPAIR      CHOLECYSTECTOMY      COLONOSCOPY      CORONARY ANGIOPLASTY      HYSTERECTOMY (CERVIX STATUS UNKNOWN)      LUMBAR DISCECTOMY  2011    microlumber L4-5 left.    TOTAL KNEE ARTHROPLASTY Right 2023    TOTAL KNEE ARTHROPLASTY Right 2023    RIGHT TOTAL KNEE ARTHROPLASTY [place] [facility] [place]       Social:   Social History     Tobacco Use    Smoking status: Never    Smokeless tobacco: Never   Substance Use Topics    Alcohol use: No     Family:   Family History   Problem Relation Age of Onset    Diabetes Mother     Cancer Father     Diabetes Sister     Diabetes Brother        Scheduled Medications:    Fidaxomicin  200 mg Oral BID    lisinopril  10 mg Oral BID    metoprolol  tartrate  25 mg Oral BID    multivitamin  1 tablet Oral Daily    pantoprazole  40 mg Oral QAM AC    QUEtiapine  25 mg Oral Nightly    heparin (porcine)  5,000 Units SubCUTAneous 3 times per day    [START ON 12/27/2024] aspirin  81 mg Oral Daily    gabapentin  200 mg Oral Q6H    atorvastatin  10 mg Oral Daily    fluticasone  1 spray Each Nostril Nightly    levothyroxine  50 mcg Oral Daily    levETIRAcetam  250 mg Oral BID    calcium carb-cholecalciferol  1 tablet Oral BID    cetirizine  5 mg Oral Daily    lidocaine  1 patch TransDERmal Daily     Infusions:   PRN Medications: hydrALAZINE, oxyCODONE, acetaminophen, ondansetron, polyethylene glycol, bisacodyl, melatonin  Allergies:   Allergies   Allergen Reactions    Sulfa Antibiotics      Abdominal pain       ROS:   Review of Systems   Constitutional: Negative.    HENT: Negative.     Eyes: Negative.    Respiratory: Negative.     Cardiovascular: Negative.    Gastrointestinal:  Positive for diarrhea.   Endocrine: Negative.    Genitourinary: Negative.    Musculoskeletal: Negative.    Skin: Negative.    Allergic/Immunologic: Negative.    Neurological: Negative.         See hpi   Hematological: Negative.    Psychiatric/Behavioral: Negative.         Objective:     Physical Exam:   Vitals:    12/26/24 1329   BP: 126/74   Pulse: (!) 104   Resp:    Temp:    SpO2: 96%     I/O last 3 completed shifts:  In: 150 [P.O.:150]  Out: -    General appearance: alert, appears stated age, and cooperative  HEENT: PERRLA  Neck: no adenopathy, no carotid bruit, no JVD, supple, symmetrical, trachea midline, and thyroid not enlarged, symmetric, no tenderness/mass/nodules  Lungs: clear to auscultation bilaterally  Heart: regular rate and rhythm, S1, S2 normal, no murmur, click, rub or gallop  Abdomen: soft, non-tender; bowel sounds normal; no masses,  no organomegaly  Extremities: extremities normal, atraumatic, no cyanosis or edema     Lab and Imaging Review   Labs:  CBC:   Recent Labs

## 2024-12-26 NOTE — PLAN OF CARE
Problem: Discharge Planning  Goal: Discharge to home or other facility with appropriate resources  12/25/2024 1604 by Ama Tyson RN  Outcome: Progressing     Problem: Pain  Goal: Verbalizes/displays adequate comfort level or baseline comfort level  12/25/2024 1604 by Ama Tyson RN  Outcome: Progressing     Problem: Safety - Adult  Goal: Free from fall injury  Outcome: Progressing     Problem: Confusion  Goal: Confusion, delirium, dementia, or psychosis is improved or at baseline  Description: INTERVENTIONS:  1. Assess for possible contributors to thought disturbance, including medications, impaired vision or hearing, underlying metabolic abnormalities, dehydration, psychiatric diagnoses, and notify attending LIP  2. Given high risk fall precautions, as indicated  3. Provide frequent short contacts to provide reality reorientation, refocusing and direction  4. Decrease environmental stimuli, including noise as appropriate  5. Monitor and intervene to maintain adequate nutrition, hydration, elimination, sleep and activity  6. If unable to ensure safety without constant attention obtain sitter and review sitter guidelines with assigned personnel  7. Initiate Psychosocial CNS and Spiritual Care consult, as indicated  Outcome: Progressing

## 2024-12-26 NOTE — PROGRESS NOTES
Occupational Therapy  Facility/Department: [facility]  Rehabilitation Occupational Therapy Daily Treatment Note    Date: 24  Patient Name: Fabiana Ceballos       Room: 0155/0155-01  MRN: [id number]  Account: [id number]   : 1945  (79 y.o.) Gender: female                    Past Medical History:  has a past medical history of Anesthesia, Arthritis, Hyperlipidemia, Hypertension, Lumbar herniated disc, Lumbar spondylosis, Lumbar stenosis, Prediabetes, Radiculopathy of lumbar region, Scoliosis, and Thyroid disease.  Past Surgical History:   has a past surgical history that includes Hysterectomy; Cholecystectomy; bladder repair; Colonoscopy; lumbar discectomy (2011); Total knee arthroplasty (Right, 2023); Coronary angioplasty; and Total knee arthroplasty (Right, 2023).    Restrictions  Restrictions/Precautions: Modified Diet, Fall Risk, Contact Precautions, General Precautions, ROM Restrictions  Spinal Precautions Comments: Per pt/family pt with spinal px and to wear TLSO until mid february  Other Position/Activity Restrictions: TLSO, soft and bite sized, recent back surgery 2024, L scalp incision with staples  Required Braces or Orthoses  Spinal: Thoracic Lumbar Sacral Orthotics  Spinal Other: when OOB, may don at EOB  Required Braces or Orthoses?: Yes    Subjective  Subjective: Pt in bed, reports no pain, eating breakfast, agreeable to OT session, /74, HR 68, O2 sats 98% on RA.  Restrictions/Precautions: Modified Diet;Fall Risk;Contact Precautions;General Precautions;ROM Restrictions             Objective     Cognition  Overall Cognitive Status: Exceptions  Arousal/Alertness: Delayed responses to stimuli  Following Commands: Follows one step commands with increased time  Attention Span: Attends with cues to redirect;Difficulty attending to directions;Difficulty dividing attention  Memory: Impaired;Decreased short term memory  Safety Judgement: Decreased awareness of need for  trunk, HOB elevated  Scooting  Assistance Level: Maximum assistance  Skilled Clinical Factors: to scoot to EOB  Transfers  Surface: From bed;Standard toilet;Wheelchair  Additional Factors: Set-up;Verbal cues;Hand placement cues;Increased time to complete;With handrails  Device: Walker  Sit to Stand  Assistance Level: Maximum assistance  Skilled Clinical Factors: from toilet, min A to stand from EOB and w/c  Stand to Sit  Assistance Level: Moderate assistance  Skilled Clinical Factors: cues for hand placement  Bed To/From Chair  Technique: Stand step  Assistance Level: Moderate assistance   OT Exercises  A/AROM Exercises: AROM shoulder press, elbow flexion, and chest press for 10 reps each     Assessment  Assessment  Assessment: Pt agreeable to OT session. Pt demonstrated mild improvement with balance and tolerance to stand at sink for 4-5 minutes with CGA for grooming tasks. Pt required cues to locate soap and toothbrush on R side of sink. Pt incontinent of bowel and required assist for managing soiled briefs/pants. Pt able to complete hygiene in stance with min A for balance. Pt required total assist to don pants with poor coordination/sequencing for use of reacher, but able to tie pants in stance. Pt required assist to don TLSO and cues for spinal precautions multiple times during session. Pt completed BUE therex with fair strength and cues for completion of all sets. Continue POC.  Activity Tolerance: Patient limited by fatigue;Patient limited by endurance;Treatment limited secondary to decreased cognition  Discharge Recommendations: 24 hour supervision or assist;Home with Home health OT;S Level 4  OT Equipment Recommendations  Other: TBD  Safety Devices  Safety Devices in place: Yes  Type of devices: Gait belt;Call light within reach;Nurse notified;Left in chair;Chair alarm in place    Patient Education  Education  Education Given To: Patient  Education Provided: Role of Therapy;Plan of  Care;Precautions;Safety;ADL Function;DME/Home Modifications;Equipment;Transfer Training;Mobility Training  Education Provided Comments: role of OT, ADL retraining, transfers training, use of AD, safety with transfers and ADLs, IPR requirements, use of a/e, posture  Education Method: Verbal;Demonstration  Barriers to Learning: Cognition  Education Outcome: Verbalized understanding;Continued education needed    Plan  Occupational Therapy Plan  Times Per Week: 5-7x/wk  Current Treatment Recommendations: Strengthening;ROM;Balance training;Functional mobility training;Endurance training;Patient/Caregiver education & training;Safety education & training;Neuromuscular re-education;Equipment evaluation, education, & procurement;Self-Care / ADL;Home management training;Coordination training    Goals  Short Term Goals  Time Frame for Short Term Goals: 1/1/25  Short Term Goal 1: Pt will perform toilet transfer with min A  Short Term Goal 2: Pt will perform toileting task with mod A  Short Term Goal 3: Pt will perform LB dressing with mod A  Short Term Goal 4: Pt will perform LB bathing with CGA and utilizing AE PRN  Short Term Goal 5: Pt will perform 5 x 20 reps BUE ex to incr strength and activity tolerance for ADLs and fxl transfers  Long Term Goals  Time Frame for Long Term Goals : 1/06/25  Long Term Goal 1: Pt will perform toilet transfer mod I  Long Term Goal 2: Pt will perform TB dressing with mod I  Long Term Goal 3: Pt will perform TB bathing with  mod I  Long Term Goal 4: Pt will perform simple IADL task with mod I    Therapy Time   Individual Concurrent Group Co-treatment   Time In 0900         Time Out 1000         Minutes 60         Timed Code Treatment Minutes: 60 Minutes       Nam Pugh OT

## 2024-12-26 NOTE — PROGRESS NOTES
[facility]: ACUTE REHAB UNIT  SPEECH-LANGUAGE PATHOLOGY      [x] Daily  [] Weekly Care Conference Note  [] Discharge    Patient:Fabiana Ceballos      :1945  MRN:[id number]  Rehab Dx/Hx: SDH (subdural hematoma) [S06.5XAA]    Precautions: falls, contact   Home situation: lives alone. Indep with med management, finances, and all household tasks.  ST Dx: [] Aphasia  [] Dysarthria  [] Apraxia   [x] Oropharyngeal dysphagia [x] Cognitive Impairment  [] Other:   Date of Admit: 2024  Room #: 0155/0155-01    Current functional status (updated daily):         Pt being seen for : [] Speech/Language Treatment  [x] Dysphagia Treatment [x] Cognitive Treatment  [] Other:  Communication: [x]WFL  [] Aphasia  [] Dysarthria  [] Apraxia  [] Pragmatic Impairment [] Non-verbal  [] Hearing Loss  [] Other:   Cognition: [] WFL  [] Mild  [x] Moderate  [x] Severe [] Unable to Assess  [] Other:  Memory: [] WFL  [] Mild  [x] Moderate  [x] Severe [] Unable to Assess  [] Other:  Behavior: [] Alert  [x] Cooperative  [x]  Pleasant  [] Confused  [] Agitated  [] Uncooperative  [] Distractible [] Motivated  [] Self-Limiting [] Anxious  [] Other:  Endurance:  [] Adequate for participation in SLP sessions  [x] Reduced overall  [x] Lethargic  [] Other:  Safety: [] No concerns at this time  [x] Reduced insight into deficits  [x]  Reduced safety awareness [] Not following call light procedures  [] Unable to Assess  [] Other:    Current Diet Order:ADULT DIET; Dysphagia - Soft and Bite Sized  Swallowing Precautions: upright for all intake, stay upright for at least 30 mins after intake, small bites/sips, close supervision, oral care 2-3x/day to reduce adverse affects in the event of aspiration, increase physical mobility as able, alternate bites/sips, slow rate of intake, general GERD precautions, general aspiration precautions, and hold PO and contact SLP if s/s of aspiration or worsening respiratory status develop.       Date: 2024      Tx    Assessment: Pt lethargic throughout session, requiring frequent self-directed rest breaks and increased time to complete tasks. Pt also with significantly slow thought processing, pt's family indicating she is very \"sharp\" and \"on it\" at baseline. Pt completed x3 sections of the COGNISTAT not completed yesterday. Pt scored WFL for calculation, but new goal added due to pt being indep with med management and finances PTA.  Pt with severe deficits with reasoning, mod deficits with judgement. Pt with x1 cough with thins - suspect due to pt being lethargic. No other instances of overt s/s of aspiration. SLP will monitor need for MBSS.  Edu to pt and  family re: diet recs, POC, and safe swallow strategies, continue goals above.    Plan: Continue as per plan of care.      Additional Information:   COGNISTAT completed 12/25 and 12/26    Cognitive Domain Scoring Range WFL    Score Severity Rating  Comments   Orientation  10 - 12 11 WFL Did not know name of hospital (used visual aids indep for rehab   Attention 6 - 8 5 Mild     Memory Registration 3 - 2 4 Mild     Language: Comprehension 5 - 6 DNT   Targeted with  MAST this date - see above   Language: Repetition  11 - 12 12 WFL     Language: Naming 7 - 8 DNT   SLP did not have stimulus cards - pt in contact  due  to C-diff   Constructional Ability  4 - 6 DNT       Memory 10 - 12 5 Mod-severe Required category cue x1, MC cue x3     Calculations 3 - 4 3 WFL  Difficulty with subtraction     Reasoning - Similarities 5 - 8  2 Severe      Reasoning - Judgement  4 - 6 2 Moderate              Barriers toward progress: Confusion, Cognitive deficit, Limited safety awareness, Limited participation, Limited insight into deficits, and Unrealistic expectations  Discharge recommendations:  [] Home independently  [] Home with assistance [x]  24 hour supervision  [] ECF [] Other:  Continued Tx Upon Discharge: ? [x] Yes [] No [] TBD based on progress while on ARU [] Vital Stim indicated []

## 2024-12-26 NOTE — DISCHARGE INSTR - COC
Continuity of Care Form    Patient Name: Fabiana Hart   :  1945  MRN:  [id number]    Admit date:  2024  Discharge date:  ***    Code Status Order: Full Code   Advance Directives:   Advance Care Flowsheet Documentation             Admitting Physician:  Monica Colindres MD  PCP: Daniel Bustos PA-C    Discharging Nurse: ***  Discharging Hospital Unit/Room#: 0155/0155-01  Discharging Unit Phone Number: ***    Emergency Contact:   Extended Emergency Contact Information  Primary Emergency Contact: MEAGHAN HART  Home Phone: [phone]  Relation: Child    Past Surgical History:  Past Surgical History:   Procedure Laterality Date    BLADDER REPAIR      CHOLECYSTECTOMY      COLONOSCOPY      CORONARY ANGIOPLASTY      HYSTERECTOMY (CERVIX STATUS UNKNOWN)      LUMBAR DISCECTOMY  2011    microlumber L4-5 left.    TOTAL KNEE ARTHROPLASTY Right 2023    TOTAL KNEE ARTHROPLASTY Right 2023    RIGHT TOTAL KNEE ARTHROPLASTY performed by Daniel Jimenez DO at [facility] [place] [place]       Immunization History:   Immunization History   Administered Date(s) Administered    COVID-19, PFIZER PURPLE top, DILUTE for use, (age 12 y+), 30mcg/0.3mL 2021, 2021, 10/01/2021    COVID-19, PFIZER, (age 12y+), IM, 30mcg/0.3mL 2023, 10/04/2024    Influenza A (F5D5-73) Vaccine PF IM 2010    Influenza Virus Vaccine 10/18/2006, 10/23/2007, 2012, 2016    Influenza, FLUZONE High Dose (age 65 y+), IM, Quadv, 0.7mL 10/05/2020, 10/12/2021, 10/18/2022, 10/16/2023    Pneumococcal, PPSV23, PNEUMOVAX 23, (age 2y+), SC/IM, 0.5mL 2020    TDaP, ADACEL (age 10y-64y), BOOSTRIX (age 10y+), IM, 0.5mL 2000    Zoster Live (Zostavax) 2012       Active Problems:  Patient Active Problem List   Diagnosis Code    DDD (degenerative disc disease), lumbar M51.369    Unilateral primary osteoarthritis, right knee M17.11    Right knee pain M25.561    Arthritis of right knee M17.11    Coronary

## 2024-12-27 LAB
ANION GAP SERPL CALCULATED.3IONS-SCNC: 9 MMOL/L (ref 3–16)
BASOPHILS # BLD: 0 K/UL (ref 0–0.2)
BASOPHILS NFR BLD: 0.4 %
BUN SERPL-MCNC: 16 MG/DL (ref 7–20)
CALCIUM SERPL-MCNC: 8.7 MG/DL (ref 8.3–10.6)
CHLORIDE SERPL-SCNC: 108 MMOL/L (ref 99–110)
CO2 SERPL-SCNC: 26 MMOL/L (ref 21–32)
CREAT SERPL-MCNC: 0.5 MG/DL (ref 0.6–1.2)
DEPRECATED RDW RBC AUTO: 17.9 % (ref 12.4–15.4)
EOSINOPHIL # BLD: 0.2 K/UL (ref 0–0.6)
EOSINOPHIL NFR BLD: 1.9 %
GFR SERPLBLD CREATININE-BSD FMLA CKD-EPI: >90 ML/MIN/{1.73_M2}
GLUCOSE SERPL-MCNC: 121 MG/DL (ref 70–99)
HCT VFR BLD AUTO: 33.2 % (ref 36–48)
HGB BLD-MCNC: 11.1 G/DL (ref 12–16)
LYMPHOCYTES # BLD: 2.4 K/UL (ref 1–5.1)
LYMPHOCYTES NFR BLD: 31.2 %
MAGNESIUM SERPL-MCNC: 1.79 MG/DL (ref 1.8–2.4)
MCH RBC QN AUTO: 27.6 PG (ref 26–34)
MCHC RBC AUTO-ENTMCNC: 33.4 G/DL (ref 31–36)
MCV RBC AUTO: 82.6 FL (ref 80–100)
MONOCYTES # BLD: 0.7 K/UL (ref 0–1.3)
MONOCYTES NFR BLD: 8.5 %
NEUTROPHILS # BLD: 4.5 K/UL (ref 1.7–7.7)
NEUTROPHILS NFR BLD: 58 %
PLATELET # BLD AUTO: 262 K/UL (ref 135–450)
PMV BLD AUTO: 8.1 FL (ref 5–10.5)
POTASSIUM SERPL-SCNC: 3.2 MMOL/L (ref 3.5–5.1)
RBC # BLD AUTO: 4.02 M/UL (ref 4–5.2)
SODIUM SERPL-SCNC: 143 MMOL/L (ref 136–145)
WBC # BLD AUTO: 7.8 K/UL (ref 4–11)

## 2024-12-27 PROCEDURE — 80048 BASIC METABOLIC PNL TOTAL CA: CPT

## 2024-12-27 PROCEDURE — 97535 SELF CARE MNGMENT TRAINING: CPT

## 2024-12-27 PROCEDURE — 6370000000 HC RX 637 (ALT 250 FOR IP): Performed by: STUDENT IN AN ORGANIZED HEALTH CARE EDUCATION/TRAINING PROGRAM

## 2024-12-27 PROCEDURE — 97112 NEUROMUSCULAR REEDUCATION: CPT

## 2024-12-27 PROCEDURE — 97110 THERAPEUTIC EXERCISES: CPT

## 2024-12-27 PROCEDURE — 97130 THER IVNTJ EA ADDL 15 MIN: CPT

## 2024-12-27 PROCEDURE — 6360000002 HC RX W HCPCS: Performed by: STUDENT IN AN ORGANIZED HEALTH CARE EDUCATION/TRAINING PROGRAM

## 2024-12-27 PROCEDURE — 97129 THER IVNTJ 1ST 15 MIN: CPT

## 2024-12-27 PROCEDURE — 97116 GAIT TRAINING THERAPY: CPT

## 2024-12-27 PROCEDURE — 85025 COMPLETE CBC W/AUTO DIFF WBC: CPT

## 2024-12-27 PROCEDURE — 83735 ASSAY OF MAGNESIUM: CPT

## 2024-12-27 PROCEDURE — 97530 THERAPEUTIC ACTIVITIES: CPT

## 2024-12-27 PROCEDURE — 1280000000 HC REHAB R&B

## 2024-12-27 PROCEDURE — 36415 COLL VENOUS BLD VENIPUNCTURE: CPT

## 2024-12-27 RX ORDER — LANOLIN ALCOHOL/MO/W.PET/CERES
400 CREAM (GRAM) TOPICAL DAILY
Status: DISPENSED | OUTPATIENT
Start: 2024-12-27

## 2024-12-27 RX ORDER — POTASSIUM CHLORIDE 1500 MG/1
40 TABLET, EXTENDED RELEASE ORAL ONCE
Status: COMPLETED | OUTPATIENT
Start: 2024-12-27 | End: 2024-12-27

## 2024-12-27 RX ADMIN — ACETAMINOPHEN 650 MG: 325 TABLET ORAL at 08:10

## 2024-12-27 RX ADMIN — ATORVASTATIN CALCIUM 10 MG: 10 TABLET, FILM COATED ORAL at 08:09

## 2024-12-27 RX ADMIN — Medication 1 TABLET: at 21:24

## 2024-12-27 RX ADMIN — GABAPENTIN 200 MG: 100 CAPSULE ORAL at 21:24

## 2024-12-27 RX ADMIN — ASPIRIN 81 MG 81 MG: 81 TABLET ORAL at 08:09

## 2024-12-27 RX ADMIN — CETIRIZINE HYDROCHLORIDE 5 MG: 5 TABLET ORAL at 08:09

## 2024-12-27 RX ADMIN — Medication 1 TABLET: at 08:08

## 2024-12-27 RX ADMIN — OXYCODONE 5 MG: 5 TABLET ORAL at 18:34

## 2024-12-27 RX ADMIN — LEVOTHYROXINE SODIUM 50 MCG: 0.05 TABLET ORAL at 06:23

## 2024-12-27 RX ADMIN — PANTOPRAZOLE SODIUM 40 MG: 40 TABLET, DELAYED RELEASE ORAL at 06:23

## 2024-12-27 RX ADMIN — METOPROLOL TARTRATE 25 MG: 25 TABLET, FILM COATED ORAL at 08:09

## 2024-12-27 RX ADMIN — GABAPENTIN 200 MG: 100 CAPSULE ORAL at 08:09

## 2024-12-27 RX ADMIN — HEPARIN SODIUM 5000 UNITS: 5000 INJECTION INTRAVENOUS; SUBCUTANEOUS at 06:23

## 2024-12-27 RX ADMIN — LISINOPRIL 10 MG: 10 TABLET ORAL at 08:08

## 2024-12-27 RX ADMIN — FIDAXOMICIN 200 MG: 200 TABLET, FILM COATED ORAL at 21:33

## 2024-12-27 RX ADMIN — LISINOPRIL 10 MG: 10 TABLET ORAL at 21:24

## 2024-12-27 RX ADMIN — HEPARIN SODIUM 5000 UNITS: 5000 INJECTION INTRAVENOUS; SUBCUTANEOUS at 21:24

## 2024-12-27 RX ADMIN — Medication 400 MG: at 14:07

## 2024-12-27 RX ADMIN — METOPROLOL TARTRATE 25 MG: 25 TABLET, FILM COATED ORAL at 21:24

## 2024-12-27 RX ADMIN — ACETAMINOPHEN 650 MG: 325 TABLET ORAL at 14:07

## 2024-12-27 RX ADMIN — GABAPENTIN 200 MG: 100 CAPSULE ORAL at 14:07

## 2024-12-27 RX ADMIN — POTASSIUM CHLORIDE 40 MEQ: 1500 TABLET, EXTENDED RELEASE ORAL at 14:07

## 2024-12-27 RX ADMIN — FIDAXOMICIN 200 MG: 200 TABLET, FILM COATED ORAL at 08:08

## 2024-12-27 RX ADMIN — QUETIAPINE FUMARATE 25 MG: 25 TABLET ORAL at 21:24

## 2024-12-27 RX ADMIN — FLUTICASONE PROPIONATE 1 SPRAY: 50 SPRAY, METERED NASAL at 21:31

## 2024-12-27 RX ADMIN — HEPARIN SODIUM 5000 UNITS: 5000 INJECTION INTRAVENOUS; SUBCUTANEOUS at 14:06

## 2024-12-27 ASSESSMENT — PAIN DESCRIPTION - DESCRIPTORS
DESCRIPTORS: ACHING

## 2024-12-27 ASSESSMENT — PAIN DESCRIPTION - LOCATION
LOCATION: HEAD

## 2024-12-27 ASSESSMENT — PAIN DESCRIPTION - ORIENTATION
ORIENTATION: ANTERIOR

## 2024-12-27 ASSESSMENT — PAIN SCALES - GENERAL
PAINLEVEL_OUTOF10: 3
PAINLEVEL_OUTOF10: 5
PAINLEVEL_OUTOF10: 0
PAINLEVEL_OUTOF10: 4

## 2024-12-27 NOTE — PROGRESS NOTES
Fabiana Ceballos  12/27/2024  [id number]    Chief Complaint: SDH (subdural hematoma)    Subjective:   No acute events overnight. Today Fabiana is seen in her room, resting in bed. She reports sleeping well last night and feeling more rested today. She notes continued headaches and diarrhea.     Personally reviewed labs.     ROS: denies f/c, n/v, cp, sob    Objective:  Patient Vitals for the past 24 hrs:   BP Temp Temp src Pulse Resp SpO2   12/27/24 0800 (!) 141/85 98 °F (36.7 °C) Oral (!) 106 18 96 %   12/26/24 2031 119/78 98.7 °F (37.1 °C) Oral 95 16 97 %   12/26/24 1329 126/74 -- -- (!) 104 -- 96 %   12/26/24 1200 131/76 -- -- -- -- --     Gen: No distress, pleasant.   HEENT: Normocephalic, left craniotomy incision healing well  CV: extremities well perfused  Resp: No respiratory distress. On room air  Abd: Soft, nondistended   Ext: No edema.   Neuro: Alert, oriented, appropriately interactive. Speech fluent  Psych: appropriate mood and affect    Wt Readings from Last 3 Encounters:   12/24/24 74.4 kg (164 lb)   11/15/24 76.2 kg (168 lb)   06/21/24 83.9 kg (185 lb)       Laboratory data:   Lab Results   Component Value Date    WBC 7.8 12/27/2024    HGB 11.1 (L) 12/27/2024    HCT 33.2 (L) 12/27/2024    MCV 82.6 12/27/2024     12/27/2024       Lab Results   Component Value Date/Time     12/27/2024 05:30 AM    K 3.2 12/27/2024 05:30 AM     12/27/2024 05:30 AM    CO2 26 12/27/2024 05:30 AM    BUN 16 12/27/2024 05:30 AM    CREATININE 0.5 12/27/2024 05:30 AM    GLUCOSE 121 12/27/2024 05:30 AM    CALCIUM 8.7 12/27/2024 05:30 AM        Therapy progress:  Physical therapy:  Bed Mobility:     Sit>supine:  Assistance Level: Moderate assistance  Skilled Clinical Factors: for BLE, maintaining spinal px  Supine>sit:  Assistance Level: Moderate assistance  Skilled Clinical Factors: for trunk, maintaining spinal px  Transfers:  Surface: Standard toilet, From bed, To bed, To chair with arms  Additional Factors: Hand  placement cues, Increased time to complete, Verbal cues  Device: Walker (RW)  Sit>stand:  Assistance Level: Minimal assistance  Skilled Clinical Factors: Robby to CGA  Stand>sit:  Assistance Level: Minimal assistance  Bed<>chair  Assistance Level: Minimal assistance  Stand Pivot:     Lateral transfer:     Car transfer:     Ambulation:  Surface: Level surface  Device: Rolling walker  Distance: 15' + 10' + 40'  Activity: Within Room, Within Unit  Activity Comments: daughter providing w/c follow when ambulating from therapy gym to room  Additional Factors: Verbal cues, Increased time to complete  Assistance Level: Contact guard assist, Minimal assistance  Gait Deviations: Slow david, Decreased step length bilateral  Skilled Clinical Factors: VC/TC for upright posture, RW management  Stairs:     Curb:     Wheelchair:       Occupational therapy:   Feeding     Grooming/Oral Hygiene  Assistance Level: Contact guard assist  Skilled Clinical Factors: standing at sink to wash hands and brush teeth with CGA for 4-5 minutes, cues to locate items on R side of sink  UE Bathing     LE Bathing     UE Dressing  Assistance Level: Moderate assistance  Skilled Clinical Factors: to assist with donning TLSO  LE Dressing  Equipment Provided: Reachers  Assistance Level: Maximum assistance  Skilled Clinical Factors: assist to thread BLEs into pants, poor use of reacher with cues, assist to manage pants up in back fully, able to tie pants in stance with CGA for balance  Putting On/Taking Off Footwear     Toileting  Assistance Level: Dependent  Skilled Clinical Factors: incontinent of bowel, assist to manage soiled brief    Speech therapy:    ADULT DIET; Dysphagia - Soft and Bite Sized  ADULT ORAL NUTRITION SUPPLEMENT; Dinner; Standard High Calorie/High Protein Oral Supplement    Body mass index is 28.14 kg/m².    Assessment and Plan:  Fabiana Ceballos is an 79 year old female with a past medical history significant for CAD s/p PCI, prior DVT

## 2024-12-27 NOTE — PROGRESS NOTES
PROGRESS NOTE    HPI: Fabiana Ceballos is a(n)79 y.o. female admitted for work-up and treatment for SDH (subdural hematoma) [S06.5XAA].     We are following for C. Diff.    Subjective:     No acute events overnight.  One episode diarrhea today.  She denies abdominal pain.      Objective:     I/O last 3 completed shifts:  In: 630 [P.O.:630]  Out: -       BP (!) 141/85   Pulse (!) 106   Temp 98 °F (36.7 °C) (Oral)   Resp 18   Ht 1.626 m (5' 4.02\")   Wt 74.4 kg (164 lb)   SpO2 96%   BMI 28.14 kg/m²     Physical Exam:  HEENT: anicteric sclera, oropharyngeal membranes pink and moist.  Cor: RRR  Lungs: non-labored, no respiratory distress  Abdomen: soft, NT. No ascites.  No hepatomegaly or splenomegaly  Extremities: no edema  Neuro: alert and oriented x 3      Results:   Lab Results   Component Value Date    INR 0.96 12/14/2023     Lab Results   Component Value Date    WBC 7.8 12/27/2024    HGB 11.1 (L) 12/27/2024    HCT 33.2 (L) 12/27/2024    MCV 82.6 12/27/2024     12/27/2024     BUN/Cr/glu/ALT/AST/amyl/lip:  16/0.5/--/--/--/--/-- (12/27 0530)  No results found.      Impression:  79 year old female with past medical history of CAD s/p PCI, prior DVT (after TKA), HTN, HLD, T5-sacral fusion (8/2024) who presented to [facility] on 12/18/24 from clinic after a fall with headache and AMS, diagnosed with left SDH and underwent craniotomy. Now at SNF at [facility] and developed diarrhea.    C. Diff colitis.  On day 2 of Dificid.  White count has normalized.    Plan:  Continue 200 mg fidaxomicin x 10 days.  Avoid PPIs and H2-Brandy.  Diet as tolerated.  Monitor and replace electrolytes as needed.    Please do not hesitate to call with questions or concerns.      Electronically signed by: JEF Morales 12/27/2024 11:59 AM     (Office) [phone]  (Fax) [phone]  Available via perfect serve

## 2024-12-27 NOTE — PROGRESS NOTES
Physical Therapy  Facility/Department: [facility]  Rehabilitation Physical Therapy Treatment Note    NAME: Fabiana Ceballos  : 1945 (79 y.o.)  MRN: [id number]  CODE STATUS: Full Code    Date of Service: 24       Restrictions:  Restrictions/Precautions: Modified Diet, Fall Risk, Contact Precautions, General Precautions, ROM Restrictions  Required Braces or Orthoses  Spinal: Thoracic Lumbar Sacral Orthotics  Spinal Other: when OOB, may don at EOB  Position Activity Restriction  Spinal Precautions: No Lifting, No Twisting, No Bending  Spinal Precautions Comments: Per pt/family pt with spinal px and to wear TLSO until mid february  Other Position/Activity Restrictions: TLSO, soft and bite sized, recent back surgery 2024, L scalp incision with staples     SUBJECTIVE  Subjective: Patient eating lunch upon arrival, agreeable to PT session.  Pain: Reports headache       OBJECTIVE  Cognition  Overall Cognitive Status: Exceptions  Arousal/Alertness: Delayed responses to stimuli  Following Commands: Follows one step commands with increased time  Attention Span: Attends with cues to redirect;Difficulty attending to directions;Difficulty dividing attention  Memory: Impaired;Decreased short term memory;Decreased recall of recent events;Decreased recall of precautions  Safety Judgement: Decreased awareness of need for assistance;Decreased awareness of need for safety  Problem Solving: Assistance required to correct errors made;Assistance required to identify errors made;Assistance required to implement solutions;Assistance required to generate solutions  Insights: Decreased awareness of deficits  Initiation: Requires cues for some  Sequencing: Requires cues for some  Orientation  Overall Orientation Status: Impaired  Orientation Level: Oriented to person;Disoriented to place;Disoriented to time;Disoriented to situation    Functional Mobility  Roll Left  Assistance Level: Contact guard assist  Skilled Clinical Factors:  skilled PT services per POC to promote activity tolerance, general strength, and safety awareness needed to maximize function and reduce risk for falls.  Activity Tolerance: Patient limited by fatigue;Patient limited by endurance;Treatment limited secondary to decreased cognition  Discharge Recommendations: 24 hour supervision or assist;Home with Home health PT  PT Equipment Recommendations  Equipment Needed: Yes  Mobility Devices: Wheelchair    Goals  Patient Goals   Patient Goals : \"get out of here\"  Short Term Goals  Time Frame for Short Term Goals: 1 week 12/30/2024  Short Term Goal 1: Pt will complete bed mobility with CGA  Short Term Goal 2: Pt will complete functional t/f with LRAD with SBA  Short Term Goal 3: Pt will ambulate >50' with LRAD wtih SBA without LOB  Short Term Goal 4: Pt will ascend/descend curb step with LRAD with CGA  Short Term Goal 5: Pt will tolerate formal balance assessment and appropriate goal to be set  Long Term Goals  Time Frame for Long Term Goals : 14 days 1/06/2025  Long Term Goal 1: Pt will complete bed mobility with Bekah  Long Term Goal 2: Pt will complete STS with LRAD with Bekah  Long Term Goal 3: Pt will complete functional t/f with LRAD with Sup  Long Term Goal 4: Pt will ambulate >150' with LRAD with SUP without LOB  Long Term Goal 5: Pt will ascend/descend curb step with LRAD with SUP    PLAN OF CARE/SAFETY  Physical Therapy Plan  General Plan: 5-7 times per week  Specific Instructions for Next Treatment: Progress mobility and gait as tolerated  Current Treatment Recommendations: Strengthening;ROM;Balance training;Functional mobility training;Transfer training;Endurance training;Wheelchair mobility training;Gait training;Stair training;Neuromuscular re-education;Manual;Cognitive reorientation;Home exercise program;Safety education & training;Patient/Caregiver education & training;Equipment evaluation, education, & procurement;Modalities;Positioning;Therapeutic

## 2024-12-27 NOTE — PROGRESS NOTES
[facility]: ACUTE REHAB UNIT  SPEECH-LANGUAGE PATHOLOGY      [x] Daily  [] Weekly Care Conference Note  [] Discharge    Patient:Fabiana Ceballos      :1945  MRN:[id number]  Rehab Dx/Hx: SDH (subdural hematoma) [S06.5XAA]    Precautions: falls, contact   Home situation: lives alone. Indep with med management, finances, and all household tasks.  ST Dx: [] Aphasia  [] Dysarthria  [] Apraxia   [x] Oropharyngeal dysphagia [x] Cognitive Impairment  [] Other:   Date of Admit: 2024  Room #: 0155/0155-01    Current functional status (updated daily):         Pt being seen for : [] Speech/Language Treatment  [x] Dysphagia Treatment [x] Cognitive Treatment  [] Other:  Communication: [x]WFL  [] Aphasia  [] Dysarthria  [] Apraxia  [] Pragmatic Impairment [] Non-verbal  [] Hearing Loss  [] Other:   Cognition: [] WFL  [] Mild  [x] Moderate  [x] Severe [] Unable to Assess  [] Other:  Memory: [] WFL  [] Mild  [x] Moderate  [x] Severe [] Unable to Assess  [] Other:  Behavior: [] Alert  [x] Cooperative  [x]  Pleasant  [] Confused  [] Agitated  [] Uncooperative  [] Distractible [] Motivated  [] Self-Limiting [] Anxious  [] Other:  Endurance:  [] Adequate for participation in SLP sessions  [x] Reduced overall  [x] Lethargic  [] Other:  Safety: [] No concerns at this time  [x] Reduced insight into deficits  [x]  Reduced safety awareness [] Not following call light procedures  [] Unable to Assess  [] Other:    Current Diet Order:ADULT DIET; Dysphagia - Soft and Bite Sized  ADULT ORAL NUTRITION SUPPLEMENT; Dinner; Standard High Calorie/High Protein Oral Supplement  Swallowing Precautions: upright for all intake, stay upright for at least 30 mins after intake, small bites/sips, close supervision, oral care 2-3x/day to reduce adverse affects in the event of aspiration, increase physical mobility as able, alternate bites/sips, slow rate of intake, general GERD precautions, general aspiration precautions, and hold PO and contact SLP if s/s  cues    Pt with significantly slow thought processing, requiring increased time and frequent repetition of stimuli.    Functional problem solving with cell phone   -pt with difficulty attempting to check voicemail - reduced insight re: difficulty  -pt required max cues to problem solve with SLP       Goal met 12/26/24.       New goal added 12/26/24:    Goal 4: Pt will complete executive function tasks (e.g.meds, time, money, etc) with 80% acc given min cues    Sample Prescription Label  -pt used a sample label to answer questions with 36% acc indep, improved to 82% acc given MAX cues    At this time, recommending assist with med management at d/c.      Other areas targeted:     Education:   SLP edu pt re: role of SLP, rationale of tx tasks, thought org strategies, recall strategies, med management strategies     Safety Devices: [x] Call light within reach  [] Chair alarm activated  [x] Bed alarm activated  [] Other:  [] Call light within reach  [] Chair alarm activated  [] Bed alarm activated  [] Other:    Assessment: Pt alert and cooperative, agreeable to tx. Pt with significantly slow thought processing, requiring increased time and frequent repetition of stimuli. Pt required mod-max cues to increaser acc with a thought org / category members task. Pt also required max cues to increase acc when using a sample prescription label to answer questions. At this time, recommending assist with med management. Pt with difficulty recalling a 4 item grocery list following 10 min delay, pt did benefit from MC cues. Recommend 24 hr supervision. Continue goals above.   Plan: Continue as per plan of care.      Additional Information:   COGNISTAT completed 12/25 and 12/26    Cognitive Domain Scoring Range WFL    Score Severity Rating  Comments   Orientation  10 - 12 11 WFL Did not know name of hospital (used visual aids indep for rehab   Attention 6 - 8 5 Mild     Memory Registration 3 - 2 4 Mild     Language: Comprehension 5 -

## 2024-12-27 NOTE — PROGRESS NOTES
Occupational Therapy  Facility/Department: [facility]  Rehabilitation Occupational Therapy Daily Treatment Note    Date: 24  Patient Name: Fabiana Ceballos       Room: 0155/0155-01  MRN: [id number]  Account: [id number]   : 1945  (79 y.o.) Gender: female                    Past Medical History:  has a past medical history of Anesthesia, Arthritis, Hyperlipidemia, Hypertension, Lumbar herniated disc, Lumbar spondylosis, Lumbar stenosis, Prediabetes, Radiculopathy of lumbar region, Scoliosis, and Thyroid disease.  Past Surgical History:   has a past surgical history that includes Hysterectomy; Cholecystectomy; bladder repair; Colonoscopy; lumbar discectomy (2011); Total knee arthroplasty (Right, 2023); Coronary angioplasty; and Total knee arthroplasty (Right, 2023).    Restrictions  Restrictions/Precautions: Modified Diet, Fall Risk, Contact Precautions, General Precautions, ROM Restrictions  Spinal Precautions Comments: Per pt/family pt with spinal px and to wear TLSO until mid february  Other Position/Activity Restrictions: TLSO, soft and bite sized, recent back surgery 2024, L scalp incision with staples  Required Braces or Orthoses  Spinal: Thoracic Lumbar Sacral Orthotics  Spinal Other: when OOB, may don at EOB  Required Braces or Orthoses?: Yes    Subjective  Subjective: Pt in bed, needing to use bathroom, agreeable to OT session, no pain at rest, increasing to 5/10 in back with movement, aching, able to continue therapy with repositioning and distraction, /69, HR 90, O2 sats 96% on RA.  Restrictions/Precautions: Modified Diet;Fall Risk;Contact Precautions;General Precautions;ROM Restrictions             Objective     Cognition  Overall Cognitive Status: Exceptions  Arousal/Alertness: Delayed responses to stimuli  Following Commands: Follows one step commands with increased time  Attention Span: Attends with cues to redirect;Difficulty attending to directions;Difficulty

## 2024-12-28 PROCEDURE — 1280000000 HC REHAB R&B

## 2024-12-28 PROCEDURE — 6370000000 HC RX 637 (ALT 250 FOR IP): Performed by: STUDENT IN AN ORGANIZED HEALTH CARE EDUCATION/TRAINING PROGRAM

## 2024-12-28 PROCEDURE — 6360000002 HC RX W HCPCS: Performed by: STUDENT IN AN ORGANIZED HEALTH CARE EDUCATION/TRAINING PROGRAM

## 2024-12-28 PROCEDURE — 92526 ORAL FUNCTION THERAPY: CPT

## 2024-12-28 PROCEDURE — 97535 SELF CARE MNGMENT TRAINING: CPT

## 2024-12-28 PROCEDURE — 97129 THER IVNTJ 1ST 15 MIN: CPT

## 2024-12-28 PROCEDURE — 97130 THER IVNTJ EA ADDL 15 MIN: CPT

## 2024-12-28 PROCEDURE — 97530 THERAPEUTIC ACTIVITIES: CPT

## 2024-12-28 RX ADMIN — Medication 1 TABLET: at 21:09

## 2024-12-28 RX ADMIN — HEPARIN SODIUM 5000 UNITS: 5000 INJECTION INTRAVENOUS; SUBCUTANEOUS at 21:09

## 2024-12-28 RX ADMIN — FIDAXOMICIN 200 MG: 200 TABLET, FILM COATED ORAL at 21:09

## 2024-12-28 RX ADMIN — GABAPENTIN 200 MG: 100 CAPSULE ORAL at 02:54

## 2024-12-28 RX ADMIN — Medication 1 TABLET: at 08:25

## 2024-12-28 RX ADMIN — LISINOPRIL 10 MG: 10 TABLET ORAL at 21:10

## 2024-12-28 RX ADMIN — ATORVASTATIN CALCIUM 10 MG: 10 TABLET, FILM COATED ORAL at 08:26

## 2024-12-28 RX ADMIN — PANTOPRAZOLE SODIUM 40 MG: 40 TABLET, DELAYED RELEASE ORAL at 06:54

## 2024-12-28 RX ADMIN — GABAPENTIN 200 MG: 100 CAPSULE ORAL at 14:27

## 2024-12-28 RX ADMIN — GABAPENTIN 200 MG: 100 CAPSULE ORAL at 08:26

## 2024-12-28 RX ADMIN — LISINOPRIL 10 MG: 10 TABLET ORAL at 08:25

## 2024-12-28 RX ADMIN — FIDAXOMICIN 200 MG: 200 TABLET, FILM COATED ORAL at 08:24

## 2024-12-28 RX ADMIN — METOPROLOL TARTRATE 25 MG: 25 TABLET, FILM COATED ORAL at 21:10

## 2024-12-28 RX ADMIN — CETIRIZINE HYDROCHLORIDE 5 MG: 5 TABLET ORAL at 08:26

## 2024-12-28 RX ADMIN — OXYCODONE 5 MG: 5 TABLET ORAL at 08:25

## 2024-12-28 RX ADMIN — LEVOTHYROXINE SODIUM 50 MCG: 0.05 TABLET ORAL at 06:54

## 2024-12-28 RX ADMIN — Medication 400 MG: at 08:25

## 2024-12-28 RX ADMIN — HEPARIN SODIUM 5000 UNITS: 5000 INJECTION INTRAVENOUS; SUBCUTANEOUS at 06:54

## 2024-12-28 RX ADMIN — Medication 5 MG: at 21:10

## 2024-12-28 RX ADMIN — HEPARIN SODIUM 5000 UNITS: 5000 INJECTION INTRAVENOUS; SUBCUTANEOUS at 14:27

## 2024-12-28 RX ADMIN — ASPIRIN 81 MG 81 MG: 81 TABLET ORAL at 08:26

## 2024-12-28 RX ADMIN — FLUTICASONE PROPIONATE 1 SPRAY: 50 SPRAY, METERED NASAL at 21:10

## 2024-12-28 RX ADMIN — QUETIAPINE FUMARATE 25 MG: 25 TABLET ORAL at 21:10

## 2024-12-28 RX ADMIN — METOPROLOL TARTRATE 25 MG: 25 TABLET, FILM COATED ORAL at 08:25

## 2024-12-28 RX ADMIN — GABAPENTIN 200 MG: 100 CAPSULE ORAL at 21:09

## 2024-12-28 ASSESSMENT — PAIN DESCRIPTION - LOCATION
LOCATION: HEAD

## 2024-12-28 ASSESSMENT — PAIN DESCRIPTION - FREQUENCY: FREQUENCY: CONTINUOUS

## 2024-12-28 ASSESSMENT — PAIN - FUNCTIONAL ASSESSMENT: PAIN_FUNCTIONAL_ASSESSMENT: PREVENTS OR INTERFERES SOME ACTIVE ACTIVITIES AND ADLS

## 2024-12-28 ASSESSMENT — PAIN DESCRIPTION - ONSET: ONSET: ON-GOING

## 2024-12-28 ASSESSMENT — PAIN SCALES - GENERAL
PAINLEVEL_OUTOF10: 4
PAINLEVEL_OUTOF10: 5
PAINLEVEL_OUTOF10: 5
PAINLEVEL_OUTOF10: 0

## 2024-12-28 ASSESSMENT — PAIN DESCRIPTION - PAIN TYPE: TYPE: ACUTE PAIN

## 2024-12-28 ASSESSMENT — PAIN DESCRIPTION - DESCRIPTORS
DESCRIPTORS: ACHING
DESCRIPTORS: ACHING

## 2024-12-28 NOTE — PLAN OF CARE
Problem: ABCDS Injury Assessment  Goal: Absence of physical injury  Outcome: Progressing  Flowsheets (Taken 12/28/2024 1309)  Absence of Physical Injury: Implement safety measures based on patient assessment     Problem: Pain  Goal: Verbalizes/displays adequate comfort level or baseline comfort level  Outcome: Progressing  Flowsheets (Taken 12/28/2024 1309)  Verbalizes/displays adequate comfort level or baseline comfort level:   Encourage patient to monitor pain and request assistance   Administer analgesics based on type and severity of pain and evaluate response   Consider cultural and social influences on pain and pain management   Assess pain using appropriate pain scale   Implement non-pharmacological measures as appropriate and evaluate response   Notify Licensed Independent Practitioner if interventions unsuccessful or patient reports new pain

## 2024-12-28 NOTE — PROGRESS NOTES
[facility]: ACUTE REHAB UNIT  SPEECH-LANGUAGE PATHOLOGY      [x] Daily  [] Weekly Care Conference Note  [] Discharge    Patient:Fabiana Ceballos      :1945  MRN:[id number]  Rehab Dx/Hx: SDH (subdural hematoma) [S06.5XAA]    Precautions: falls, contact   Home situation: lives alone. Indep with med management, finances, and all household tasks.  ST Dx: [] Aphasia  [] Dysarthria  [] Apraxia   [x] Oropharyngeal dysphagia [x] Cognitive Impairment  [] Other:   Date of Admit: 2024  Room #: 0155/0155-01    Current functional status (updated daily):         Pt being seen for : [] Speech/Language Treatment  [x] Dysphagia Treatment [x] Cognitive Treatment  [] Other:  Communication: [x]WFL  [] Aphasia  [] Dysarthria  [] Apraxia  [] Pragmatic Impairment [] Non-verbal  [] Hearing Loss  [] Other:   Cognition: [] WFL  [] Mild  [x] Moderate  [x] Severe [] Unable to Assess  [] Other:  Memory: [] WFL  [] Mild  [x] Moderate  [x] Severe [] Unable to Assess  [] Other:  Behavior: [] Alert  [x] Cooperative  [x]  Pleasant  [] Confused  [] Agitated  [] Uncooperative  [] Distractible [] Motivated  [] Self-Limiting [] Anxious  [] Other:  Endurance:  [] Adequate for participation in SLP sessions  [x] Reduced overall  [x] Lethargic  [] Other:  Safety: [] No concerns at this time  [x] Reduced insight into deficits  [x]  Reduced safety awareness [] Not following call light procedures  [] Unable to Assess  [] Other:    Current Diet Order:ADULT DIET; Dysphagia - Soft and Bite Sized  ADULT ORAL NUTRITION SUPPLEMENT; Dinner; Standard High Calorie/High Protein Oral Supplement  Swallowing Precautions: upright for all intake, stay upright for at least 30 mins after intake, small bites/sips, close supervision, oral care 2-3x/day to reduce adverse affects in the event of aspiration, increase physical mobility as able, alternate bites/sips, slow rate of intake, general GERD precautions, general aspiration precautions, and hold PO and contact SLP if s/s  aids indep for rehab   Attention 6 - 8 5 Mild     Memory Registration 3 - 2 4 Mild     Language: Comprehension 5 - 6 DNT   Targeted with  MAST this date - see above   Language: Repetition  11 - 12 12 WFL     Language: Naming 7 - 8 DNT   SLP did not have stimulus cards - pt in contact  due  to C-diff   Constructional Ability  4 - 6 DNT       Memory 10 - 12 5 Mod-severe Required category cue x1, MC cue x3     Calculations 3 - 4 3 WFL  Difficulty with subtraction     Reasoning - Similarities 5 - 8  2 Severe      Reasoning - Judgement  4 - 6 2 Moderate              Barriers toward progress: Confusion, Cognitive deficit, Limited safety awareness, Limited participation, Limited insight into deficits, and Unrealistic expectations  Discharge recommendations:  [] Home independently  [] Home with assistance [x]  24 hour supervision  [] ECF [] Other:  Continued Tx Upon Discharge: ? [x] Yes [] No [] TBD based on progress while on ARU [] Vital Stim indicated [] Other:   Estimated discharge date: 01/10/25    Interventions used this date:  [] Speech/Language Treatment  [] Instruction in HEP [] Group [] Dysphagia Treatment [x] Cognitive Treatment   [] Other:      Total Time Breakdown / Charges    Time in Time out Total Time / units   Cognitive Tx 0800 0830 30 min / 2 units    Speech Tx -- -- --   Dysphagia Tx 0730 0800 30 min / 1 unit       Electronically Signed by     Winter Yanes M.S. CCC-SLP  Speech-language pathologist  [id number]

## 2024-12-28 NOTE — PROGRESS NOTES
PROGRESS NOTE  S:79 yrs Patient  admitted on 12/24/2024 with SDH (subdural hematoma) [S06.5XAA] .    Diarrhea improving    Current Hospital Schedued Meds   magnesium oxide  400 mg Oral Daily    Fidaxomicin  200 mg Oral BID    lisinopril  10 mg Oral BID    metoprolol tartrate  25 mg Oral BID    multivitamin  1 tablet Oral Daily    pantoprazole  40 mg Oral QAM AC    QUEtiapine  25 mg Oral Nightly    heparin (porcine)  5,000 Units SubCUTAneous 3 times per day    aspirin  81 mg Oral Daily    gabapentin  200 mg Oral Q6H    atorvastatin  10 mg Oral Daily    fluticasone  1 spray Each Nostril Nightly    levothyroxine  50 mcg Oral Daily    calcium carb-cholecalciferol  1 tablet Oral BID    cetirizine  5 mg Oral Daily    lidocaine  1 patch TransDERmal Daily     Current Hospital IV Meds    Current Hospital PRN Meds  hydrALAZINE, oxyCODONE, acetaminophen, ondansetron, polyethylene glycol, bisacodyl, melatonin    Exam:   Vitals:    12/28/24 1215   BP: (!) 141/82   Pulse:    Resp: 18   Temp:    SpO2:      I/O last 3 completed shifts:  In: 120 [P.O.:120]  Out: -    General appearance: alert, appears stated age, and cooperative  HEENT: PERRLA  Neck: no adenopathy, no carotid bruit, no JVD, supple, symmetrical, trachea midline, and thyroid not enlarged, symmetric, no tenderness/mass/nodules  Lungs: clear to auscultation bilaterally  Heart: regular rate and rhythm, S1, S2 normal, no murmur, click, rub or gallop  Abdomen: soft, non-tender; bowel sounds normal; no masses,  no organomegaly  Extremities: extremities normal, atraumatic, no cyanosis or edema     Labs:  CBC:   Recent Labs     12/27/24  0530   WBC 7.8   HGB 11.1*   HCT 33.2*   MCV 82.6        BMP:   Recent Labs     12/27/24  0530      K 3.2*      CO2 26   BUN 16   CREATININE 0.5*     LIVER PROFILE: No results for input(s): \"AST\", \"ALT\", \"LIPASE\", \"AMYLASE\", \"BILIDIR\", \"BILITOT\", \"ALKPHOS\" in the last 72

## 2024-12-28 NOTE — PROGRESS NOTES
Occupational Therapy  Facility/Department: [facility]  Rehabilitation Occupational Therapy Daily Treatment Note    Date: 24  Patient Name: Fabiana Ceballos       Room: 0155/0155-01  MRN: [id number]  Account: [id number]   : 1945  (79 y.o.) Gender: female                    Past Medical History:  has a past medical history of Anesthesia, Arthritis, Hyperlipidemia, Hypertension, Lumbar herniated disc, Lumbar spondylosis, Lumbar stenosis, Prediabetes, Radiculopathy of lumbar region, Scoliosis, and Thyroid disease.  Past Surgical History:   has a past surgical history that includes Hysterectomy; Cholecystectomy; bladder repair; Colonoscopy; lumbar discectomy (2011); Total knee arthroplasty (Right, 2023); Coronary angioplasty; and Total knee arthroplasty (Right, 2023).    Restrictions  Restrictions/Precautions: Modified Diet, Fall Risk, Contact Precautions, General Precautions, ROM Restrictions  Spinal Precautions Comments: Per pt/family pt with spinal px and to wear TLSO until mid february  Other Position/Activity Restrictions: TLSO, soft and bite sized, recent back surgery 2024, L scalp incision with staples, contact-Cdiff, spinal  Required Braces or Orthoses  Spinal: Thoracic Lumbar Sacral Orthotics  Spinal Other: when OOB, may don at EOB  Required Braces or Orthoses?: Yes    Subjective  Subjective: Patient supine in bed upon OT arrival. Patient with fatigue and fluctuating alertness throughout session requiring increasing moderate cues to remain on task and alert. Patient vomitted at end of session and assisted back to bed with BP supine 163/86 and 65bpm; RN aware  Restrictions/Precautions: Modified Diet;Fall Risk;Contact Precautions;General Precautions;ROM Restrictions             Objective     Cognition  Overall Cognitive Status: Exceptions  Arousal/Alertness: Delayed responses to stimuli;Inconsistent responses to stimuli (patient requiring increased cues to remain alert as session

## 2024-12-28 NOTE — PROGRESS NOTES
Physical Therapy  Facility/Department: [facility]  Rehabilitation Physical Therapy Treatment Note    NAME: Fabiana Ceballos  : 1945 (79 y.o.)  MRN: [id number]  CODE STATUS: Full Code    Date of Service: 24       Restrictions:  Restrictions/Precautions: Modified Diet, Fall Risk, Contact Precautions, General Precautions, ROM Restrictions  Required Braces or Orthoses  Spinal: Thoracic Lumbar Sacral Orthotics  Spinal Other: when OOB, may don at EOB  Position Activity Restriction  Spinal Precautions: No Lifting, No Twisting, No Bending  Spinal Precautions Comments: Per pt/family pt with spinal px and to wear TLSO until mid february  Other Position/Activity Restrictions: TLSO, soft and bite sized, recent back surgery 2024, L scalp incision with staples     SUBJECTIVE  Subjective: Patient seated EOB upon arrival after ambulating to bathroom.  Pain: Voices \"horrendous\" headache, unable to provide pain rating       OBJECTIVE  Orientation  Overall Orientation Status: Impaired  Orientation Level: Oriented to person;Disoriented to place;Disoriented to time;Disoriented to situation    Functional Mobility  Sit to Supine  Assistance Level: Minimal assistance  Balance  Standing Balance: Minimal assistance  Transfers  Surface: From bed;To chair with arms  Additional Factors: Hand placement cues;Increased time to complete;Verbal cues  Device: Walker (RW)  Sit to Stand  Assistance Level: Minimal assistance  Stand to Sit  Assistance Level: Minimal assistance  Bed To/From Chair  Technique: Stand pivot  Assistance Level: Minimal assistance  Skilled Clinical Factors: Robby first SPT, 2nd person present during 2nd for safety d/t arousal levels      PT Exercises  Exercise Treatment: Seated 1.5# ankle weights 10x: LAQ      2nd Session:  Patient seen for further PT d/t unable to complete full hour of therapy at 10am. Approached patient when nursing administering medications. Continuing to not open eyes, grabbing at sheets and

## 2024-12-29 VITALS
WEIGHT: 158.29 LBS | OXYGEN SATURATION: 96 % | SYSTOLIC BLOOD PRESSURE: 131 MMHG | DIASTOLIC BLOOD PRESSURE: 78 MMHG | TEMPERATURE: 98 F | HEART RATE: 96 BPM | BODY MASS INDEX: 27.02 KG/M2 | RESPIRATION RATE: 16 BRPM | HEIGHT: 64 IN

## 2024-12-29 PROCEDURE — 6360000002 HC RX W HCPCS: Performed by: STUDENT IN AN ORGANIZED HEALTH CARE EDUCATION/TRAINING PROGRAM

## 2024-12-29 PROCEDURE — 6370000000 HC RX 637 (ALT 250 FOR IP): Performed by: STUDENT IN AN ORGANIZED HEALTH CARE EDUCATION/TRAINING PROGRAM

## 2024-12-29 PROCEDURE — 1280000000 HC REHAB R&B

## 2024-12-29 RX ORDER — POTASSIUM CHLORIDE 1500 MG/1
20 TABLET, EXTENDED RELEASE ORAL 2 TIMES DAILY
Status: DISCONTINUED | OUTPATIENT
Start: 2024-12-29 | End: 2024-12-30

## 2024-12-29 RX ADMIN — PANTOPRAZOLE SODIUM 40 MG: 40 TABLET, DELAYED RELEASE ORAL at 06:50

## 2024-12-29 RX ADMIN — Medication 5 MG: at 21:21

## 2024-12-29 RX ADMIN — LEVOTHYROXINE SODIUM 50 MCG: 0.05 TABLET ORAL at 06:50

## 2024-12-29 RX ADMIN — GABAPENTIN 200 MG: 100 CAPSULE ORAL at 14:03

## 2024-12-29 RX ADMIN — ACETAMINOPHEN 650 MG: 325 TABLET ORAL at 09:33

## 2024-12-29 RX ADMIN — Medication 1 TABLET: at 09:33

## 2024-12-29 RX ADMIN — FLUTICASONE PROPIONATE 1 SPRAY: 50 SPRAY, METERED NASAL at 21:23

## 2024-12-29 RX ADMIN — GABAPENTIN 200 MG: 100 CAPSULE ORAL at 04:26

## 2024-12-29 RX ADMIN — QUETIAPINE FUMARATE 25 MG: 25 TABLET ORAL at 21:22

## 2024-12-29 RX ADMIN — ASPIRIN 81 MG 81 MG: 81 TABLET ORAL at 09:35

## 2024-12-29 RX ADMIN — METOPROLOL TARTRATE 25 MG: 25 TABLET, FILM COATED ORAL at 09:34

## 2024-12-29 RX ADMIN — Medication 400 MG: at 09:34

## 2024-12-29 RX ADMIN — HEPARIN SODIUM 5000 UNITS: 5000 INJECTION INTRAVENOUS; SUBCUTANEOUS at 04:27

## 2024-12-29 RX ADMIN — GABAPENTIN 200 MG: 100 CAPSULE ORAL at 21:22

## 2024-12-29 RX ADMIN — HEPARIN SODIUM 5000 UNITS: 5000 INJECTION INTRAVENOUS; SUBCUTANEOUS at 14:03

## 2024-12-29 RX ADMIN — HEPARIN SODIUM 5000 UNITS: 5000 INJECTION INTRAVENOUS; SUBCUTANEOUS at 21:22

## 2024-12-29 RX ADMIN — ATORVASTATIN CALCIUM 10 MG: 10 TABLET, FILM COATED ORAL at 09:34

## 2024-12-29 RX ADMIN — POTASSIUM CHLORIDE 20 MEQ: 1500 TABLET, EXTENDED RELEASE ORAL at 21:22

## 2024-12-29 RX ADMIN — Medication 1 TABLET: at 09:32

## 2024-12-29 RX ADMIN — DICLOFENAC SODIUM TOPICAL GEL, 1% 4 G: 10 GEL TOPICAL at 21:23

## 2024-12-29 RX ADMIN — CETIRIZINE HYDROCHLORIDE 5 MG: 5 TABLET ORAL at 09:34

## 2024-12-29 RX ADMIN — LISINOPRIL 10 MG: 10 TABLET ORAL at 09:32

## 2024-12-29 RX ADMIN — FIDAXOMICIN 200 MG: 200 TABLET, FILM COATED ORAL at 21:22

## 2024-12-29 RX ADMIN — METOPROLOL TARTRATE 25 MG: 25 TABLET, FILM COATED ORAL at 21:21

## 2024-12-29 RX ADMIN — Medication 1 TABLET: at 21:22

## 2024-12-29 RX ADMIN — GABAPENTIN 200 MG: 100 CAPSULE ORAL at 09:33

## 2024-12-29 RX ADMIN — LISINOPRIL 10 MG: 10 TABLET ORAL at 21:22

## 2024-12-29 RX ADMIN — FIDAXOMICIN 200 MG: 200 TABLET, FILM COATED ORAL at 09:32

## 2024-12-29 ASSESSMENT — PAIN SCALES - GENERAL
PAINLEVEL_OUTOF10: 9
PAINLEVEL_OUTOF10: 9
PAINLEVEL_OUTOF10: 0

## 2024-12-29 ASSESSMENT — PAIN DESCRIPTION - LOCATION: LOCATION: HEAD

## 2024-12-29 NOTE — PLAN OF CARE
Patient remains free from falls and injuries. Call light within reach, patient has bed alarm on and non skid footwear on.  Patient not always compliant with asking staff for assistance with transfers, she is located close to nursing station to help patient get out of bed without help. Jeff Lyle, RN

## 2024-12-29 NOTE — PLAN OF CARE
Problem: Pain  Goal: Verbalizes/displays adequate comfort level or baseline comfort level  12/28/2024 2155 by Shima Chau RN  Outcome: Progressing  12/28/2024 1309 by Carleen Flynn RN  Outcome: Progressing  Flowsheets (Taken 12/28/2024 1309)  Verbalizes/displays adequate comfort level or baseline comfort level:   Encourage patient to monitor pain and request assistance   Administer analgesics based on type and severity of pain and evaluate response   Consider cultural and social influences on pain and pain management   Assess pain using appropriate pain scale   Implement non-pharmacological measures as appropriate and evaluate response   Notify Licensed Independent Practitioner if interventions unsuccessful or patient reports new pain     Problem: Safety - Adult  Goal: Free from fall injury  Outcome: Progressing     Problem: ABCDS Injury Assessment  Goal: Absence of physical injury  12/28/2024 1309 by Carleen Flynn RN  Outcome: Progressing  Flowsheets (Taken 12/28/2024 1309)  Absence of Physical Injury: Implement safety measures based on patient assessment

## 2024-12-29 NOTE — PROGRESS NOTES
Fabiana Ceballos  12/28/2024  [id number]    Chief Complaint: SDH (subdural hematoma)    Subjective:   Patient states that she is feeling well and denies any new onset complaints.     ROS: denies f/c, n/v, cp, sob    Objective:  Patient Vitals for the past 24 hrs:   BP Temp Temp src Pulse Resp SpO2   12/28/24 2100 137/84 97.9 °F (36.6 °C) Oral 97 18 95 %   12/28/24 1215 (!) 141/82 -- -- -- 18 --   12/28/24 1016 (!) 166/108 -- -- 100 -- --   12/28/24 0819 (!) 164/93 -- -- 93 18 93 %     Gen: No distress, pleasant.   HEENT: Normocephalic, left craniotomy incision healing well  CV: extremities well perfused  Resp: No respiratory distress. On room air  Abd: Soft, nondistended   Ext: No edema.   Neuro: Alert, oriented, appropriately interactive. Speech fluent  Psych: appropriate mood and affect    Wt Readings from Last 3 Encounters:   12/24/24 74.4 kg (164 lb)   11/15/24 76.2 kg (168 lb)   06/21/24 83.9 kg (185 lb)       Laboratory data:   Lab Results   Component Value Date    WBC 7.8 12/27/2024    HGB 11.1 (L) 12/27/2024    HCT 33.2 (L) 12/27/2024    MCV 82.6 12/27/2024     12/27/2024       Lab Results   Component Value Date/Time     12/27/2024 05:30 AM    K 3.2 12/27/2024 05:30 AM     12/27/2024 05:30 AM    CO2 26 12/27/2024 05:30 AM    BUN 16 12/27/2024 05:30 AM    CREATININE 0.5 12/27/2024 05:30 AM    GLUCOSE 121 12/27/2024 05:30 AM    CALCIUM 8.7 12/27/2024 05:30 AM        Therapy progress:  Physical therapy:  Bed Mobility:     Sit>supine:  Assistance Level: Minimal assistance  Skilled Clinical Factors: for BLE, maintaining spinal px  Supine>sit:  Assistance Level: Minimal assistance  Skilled Clinical Factors: for trunk, maintaining spinal px  Transfers:  Surface: From bed, To chair with arms  Additional Factors: Hand placement cues, Increased time to complete, Verbal cues  Device: Walker (RW)  Sit>stand:  Assistance Level: Minimal assistance  Skilled Clinical Factors: Robby to

## 2024-12-30 ENCOUNTER — APPOINTMENT (OUTPATIENT)
Dept: CT IMAGING | Age: 79
DRG: 949 | End: 2024-12-30
Attending: STUDENT IN AN ORGANIZED HEALTH CARE EDUCATION/TRAINING PROGRAM
Payer: MEDICARE

## 2024-12-30 LAB
ANION GAP SERPL CALCULATED.3IONS-SCNC: 11 MMOL/L (ref 3–16)
BACTERIA URNS QL MICRO: ABNORMAL /HPF
BASOPHILS # BLD: 0.1 K/UL (ref 0–0.2)
BASOPHILS NFR BLD: 1 %
BILIRUB UR QL STRIP.AUTO: NEGATIVE
BUN SERPL-MCNC: 17 MG/DL (ref 7–20)
CALCIUM SERPL-MCNC: 9.3 MG/DL (ref 8.3–10.6)
CHLORIDE SERPL-SCNC: 104 MMOL/L (ref 99–110)
CLARITY UR: ABNORMAL
CO2 SERPL-SCNC: 22 MMOL/L (ref 21–32)
COLOR UR: YELLOW
CREAT SERPL-MCNC: 0.5 MG/DL (ref 0.6–1.2)
DEPRECATED RDW RBC AUTO: 18.5 % (ref 12.4–15.4)
EOSINOPHIL # BLD: 0.2 K/UL (ref 0–0.6)
EOSINOPHIL NFR BLD: 1.7 %
EPI CELLS #/AREA URNS HPF: ABNORMAL /HPF (ref 0–5)
GFR SERPLBLD CREATININE-BSD FMLA CKD-EPI: >90 ML/MIN/{1.73_M2}
GLUCOSE SERPL-MCNC: 97 MG/DL (ref 70–99)
GLUCOSE UR STRIP.AUTO-MCNC: NEGATIVE MG/DL
HCT VFR BLD AUTO: 35.8 % (ref 36–48)
HGB BLD-MCNC: 11.8 G/DL (ref 12–16)
HGB UR QL STRIP.AUTO: ABNORMAL
KETONES UR STRIP.AUTO-MCNC: NEGATIVE MG/DL
LEUKOCYTE ESTERASE UR QL STRIP.AUTO: ABNORMAL
LYMPHOCYTES # BLD: 2.4 K/UL (ref 1–5.1)
LYMPHOCYTES NFR BLD: 23.4 %
MCH RBC QN AUTO: 27.4 PG (ref 26–34)
MCHC RBC AUTO-ENTMCNC: 32.9 G/DL (ref 31–36)
MCV RBC AUTO: 83.3 FL (ref 80–100)
MONOCYTES # BLD: 0.6 K/UL (ref 0–1.3)
MONOCYTES NFR BLD: 6.3 %
NEUTROPHILS # BLD: 6.8 K/UL (ref 1.7–7.7)
NEUTROPHILS NFR BLD: 67.6 %
NITRITE UR QL STRIP.AUTO: NEGATIVE
PH UR STRIP.AUTO: 7.5 [PH] (ref 5–8)
PLATELET # BLD AUTO: 275 K/UL (ref 135–450)
PMV BLD AUTO: 8.4 FL (ref 5–10.5)
POTASSIUM SERPL-SCNC: 4.2 MMOL/L (ref 3.5–5.1)
PROT UR STRIP.AUTO-MCNC: 30 MG/DL
RBC # BLD AUTO: 4.29 M/UL (ref 4–5.2)
RBC #/AREA URNS HPF: ABNORMAL /HPF (ref 0–4)
SODIUM SERPL-SCNC: 137 MMOL/L (ref 136–145)
SP GR UR STRIP.AUTO: 1.02 (ref 1–1.03)
UA COMPLETE W REFLEX CULTURE PNL UR: YES
UA DIPSTICK W REFLEX MICRO PNL UR: YES
URN SPEC COLLECT METH UR: ABNORMAL
UROBILINOGEN UR STRIP-ACNC: 0.2 E.U./DL
WBC # BLD AUTO: 10.1 K/UL (ref 4–11)
WBC #/AREA URNS HPF: ABNORMAL /HPF (ref 0–5)

## 2024-12-30 PROCEDURE — 81001 URINALYSIS AUTO W/SCOPE: CPT

## 2024-12-30 PROCEDURE — 6370000000 HC RX 637 (ALT 250 FOR IP): Performed by: STUDENT IN AN ORGANIZED HEALTH CARE EDUCATION/TRAINING PROGRAM

## 2024-12-30 PROCEDURE — 36415 COLL VENOUS BLD VENIPUNCTURE: CPT

## 2024-12-30 PROCEDURE — 97129 THER IVNTJ 1ST 15 MIN: CPT

## 2024-12-30 PROCEDURE — 87086 URINE CULTURE/COLONY COUNT: CPT

## 2024-12-30 PROCEDURE — 1280000000 HC REHAB R&B

## 2024-12-30 PROCEDURE — 92526 ORAL FUNCTION THERAPY: CPT

## 2024-12-30 PROCEDURE — 97112 NEUROMUSCULAR REEDUCATION: CPT

## 2024-12-30 PROCEDURE — 97110 THERAPEUTIC EXERCISES: CPT

## 2024-12-30 PROCEDURE — 85025 COMPLETE CBC W/AUTO DIFF WBC: CPT

## 2024-12-30 PROCEDURE — 87186 SC STD MICRODIL/AGAR DIL: CPT

## 2024-12-30 PROCEDURE — 87077 CULTURE AEROBIC IDENTIFY: CPT

## 2024-12-30 PROCEDURE — 70450 CT HEAD/BRAIN W/O DYE: CPT

## 2024-12-30 PROCEDURE — 97116 GAIT TRAINING THERAPY: CPT

## 2024-12-30 PROCEDURE — 6360000002 HC RX W HCPCS: Performed by: STUDENT IN AN ORGANIZED HEALTH CARE EDUCATION/TRAINING PROGRAM

## 2024-12-30 PROCEDURE — 97535 SELF CARE MNGMENT TRAINING: CPT

## 2024-12-30 PROCEDURE — 97130 THER IVNTJ EA ADDL 15 MIN: CPT

## 2024-12-30 PROCEDURE — 80048 BASIC METABOLIC PNL TOTAL CA: CPT

## 2024-12-30 PROCEDURE — 97530 THERAPEUTIC ACTIVITIES: CPT

## 2024-12-30 RX ORDER — CIPROFLOXACIN 500 MG/1
500 TABLET, FILM COATED ORAL 2 TIMES DAILY
Status: DISCONTINUED | OUTPATIENT
Start: 2024-12-30 | End: 2024-12-31

## 2024-12-30 RX ORDER — ASCORBIC ACID 500 MG
500 TABLET ORAL 2 TIMES DAILY
Status: DISPENSED | OUTPATIENT
Start: 2024-12-30

## 2024-12-30 RX ADMIN — ATORVASTATIN CALCIUM 10 MG: 10 TABLET, FILM COATED ORAL at 08:22

## 2024-12-30 RX ADMIN — OXYCODONE 5 MG: 5 TABLET ORAL at 17:14

## 2024-12-30 RX ADMIN — DICLOFENAC SODIUM TOPICAL GEL, 1% 4 G: 10 GEL TOPICAL at 08:23

## 2024-12-30 RX ADMIN — HEPARIN SODIUM 5000 UNITS: 5000 INJECTION INTRAVENOUS; SUBCUTANEOUS at 13:02

## 2024-12-30 RX ADMIN — GABAPENTIN 200 MG: 100 CAPSULE ORAL at 22:13

## 2024-12-30 RX ADMIN — LEVOTHYROXINE SODIUM 50 MCG: 0.05 TABLET ORAL at 05:35

## 2024-12-30 RX ADMIN — ASPIRIN 81 MG 81 MG: 81 TABLET ORAL at 08:22

## 2024-12-30 RX ADMIN — Medication 5 MG: at 22:12

## 2024-12-30 RX ADMIN — Medication 1 TABLET: at 22:13

## 2024-12-30 RX ADMIN — HEPARIN SODIUM 5000 UNITS: 5000 INJECTION INTRAVENOUS; SUBCUTANEOUS at 22:12

## 2024-12-30 RX ADMIN — POTASSIUM CHLORIDE 20 MEQ: 1500 TABLET, EXTENDED RELEASE ORAL at 08:22

## 2024-12-30 RX ADMIN — DICLOFENAC SODIUM TOPICAL GEL, 1% 4 G: 10 GEL TOPICAL at 13:01

## 2024-12-30 RX ADMIN — DICLOFENAC SODIUM TOPICAL GEL, 1% 4 G: 10 GEL TOPICAL at 16:01

## 2024-12-30 RX ADMIN — CIPROFLOXACIN HYDROCHLORIDE 500 MG: 500 TABLET, FILM COATED ORAL at 22:13

## 2024-12-30 RX ADMIN — GABAPENTIN 200 MG: 100 CAPSULE ORAL at 15:26

## 2024-12-30 RX ADMIN — METOPROLOL TARTRATE 25 MG: 25 TABLET, FILM COATED ORAL at 22:12

## 2024-12-30 RX ADMIN — METOPROLOL TARTRATE 25 MG: 25 TABLET, FILM COATED ORAL at 08:23

## 2024-12-30 RX ADMIN — Medication 400 MG: at 08:22

## 2024-12-30 RX ADMIN — CIPROFLOXACIN HYDROCHLORIDE 500 MG: 500 TABLET, FILM COATED ORAL at 13:02

## 2024-12-30 RX ADMIN — DICLOFENAC SODIUM TOPICAL GEL, 1% 4 G: 10 GEL TOPICAL at 22:14

## 2024-12-30 RX ADMIN — OXYCODONE 5 MG: 5 TABLET ORAL at 22:13

## 2024-12-30 RX ADMIN — CETIRIZINE HYDROCHLORIDE 5 MG: 5 TABLET ORAL at 08:21

## 2024-12-30 RX ADMIN — OXYCODONE HYDROCHLORIDE AND ACETAMINOPHEN 500 MG: 500 TABLET ORAL at 13:02

## 2024-12-30 RX ADMIN — LISINOPRIL 10 MG: 10 TABLET ORAL at 22:13

## 2024-12-30 RX ADMIN — Medication 1 TABLET: at 08:22

## 2024-12-30 RX ADMIN — PANTOPRAZOLE SODIUM 40 MG: 40 TABLET, DELAYED RELEASE ORAL at 05:35

## 2024-12-30 RX ADMIN — ACETAMINOPHEN 650 MG: 325 TABLET ORAL at 08:20

## 2024-12-30 RX ADMIN — FLUTICASONE PROPIONATE 1 SPRAY: 50 SPRAY, METERED NASAL at 22:14

## 2024-12-30 RX ADMIN — GABAPENTIN 200 MG: 100 CAPSULE ORAL at 08:21

## 2024-12-30 RX ADMIN — HEPARIN SODIUM 5000 UNITS: 5000 INJECTION INTRAVENOUS; SUBCUTANEOUS at 05:35

## 2024-12-30 RX ADMIN — FIDAXOMICIN 200 MG: 200 TABLET, FILM COATED ORAL at 08:20

## 2024-12-30 RX ADMIN — OXYCODONE HYDROCHLORIDE AND ACETAMINOPHEN 500 MG: 500 TABLET ORAL at 22:13

## 2024-12-30 RX ADMIN — LISINOPRIL 10 MG: 10 TABLET ORAL at 08:22

## 2024-12-30 RX ADMIN — Medication 1 TABLET: at 08:23

## 2024-12-30 RX ADMIN — GABAPENTIN 200 MG: 100 CAPSULE ORAL at 03:55

## 2024-12-30 RX ADMIN — FIDAXOMICIN 200 MG: 200 TABLET, FILM COATED ORAL at 22:12

## 2024-12-30 ASSESSMENT — PAIN SCALES - WONG BAKER
WONGBAKER_NUMERICALRESPONSE: HURTS LITTLE MORE
WONGBAKER_NUMERICALRESPONSE: HURTS EVEN MORE

## 2024-12-30 ASSESSMENT — PAIN DESCRIPTION - LOCATION
LOCATION: GENERALIZED
LOCATION: HEAD;GENERALIZED

## 2024-12-30 ASSESSMENT — PAIN SCALES - GENERAL
PAINLEVEL_OUTOF10: 6
PAINLEVEL_OUTOF10: 4
PAINLEVEL_OUTOF10: 3
PAINLEVEL_OUTOF10: 6

## 2024-12-30 ASSESSMENT — PAIN DESCRIPTION - DESCRIPTORS
DESCRIPTORS: ACHING
DESCRIPTORS: ACHING;DISCOMFORT;SORE

## 2024-12-30 ASSESSMENT — PAIN DESCRIPTION - FREQUENCY: FREQUENCY: INTERMITTENT

## 2024-12-30 ASSESSMENT — PAIN - FUNCTIONAL ASSESSMENT: PAIN_FUNCTIONAL_ASSESSMENT: PREVENTS OR INTERFERES SOME ACTIVE ACTIVITIES AND ADLS

## 2024-12-30 ASSESSMENT — PAIN DESCRIPTION - PAIN TYPE: TYPE: ACUTE PAIN

## 2024-12-30 ASSESSMENT — PAIN DESCRIPTION - ONSET: ONSET: GRADUAL

## 2024-12-30 NOTE — PROGRESS NOTES
Fabiana Ceballos  12/30/2024  [id number]    Chief Complaint: SDH (subdural hematoma)    Subjective:   No acute events overnight. Today Fabiana is seen in her room. She appears very fatigued and is seating up in her wheelchair. Therapy notes that she was initially doing very well this morning and was working on walking, but after a half hour she became very tired. Will obtain UA and CT head.     ROS: denies f/c, n/v, cp, sob    Objective:  Patient Vitals for the past 24 hrs:   BP Temp Temp src Pulse Resp SpO2   12/30/24 0836 (!) 147/89 -- -- (!) 109 -- 95 %   12/30/24 0815 (!) 147/89 98.2 °F (36.8 °C) Oral (!) 109 18 95 %   12/29/24 2115 131/78 98 °F (36.7 °C) Oral 96 16 96 %     Gen: No distress, very fatigued  HEENT: Normocephalic, left craniotomy incision healing well  CV: extremities well perfused  Resp: No respiratory distress. On room air  Abd: Soft, nondistended   Ext: No edema.   Neuro: Alert, fatigued, moves all four extremities   Psych: appropriate mood and affect    Wt Readings from Last 3 Encounters:   12/29/24 71.8 kg (158 lb 4.6 oz)   11/15/24 76.2 kg (168 lb)   06/21/24 83.9 kg (185 lb)       Laboratory data:   Lab Results   Component Value Date    WBC 10.1 12/30/2024    HGB 11.8 (L) 12/30/2024    HCT 35.8 (L) 12/30/2024    MCV 83.3 12/30/2024     12/30/2024       Lab Results   Component Value Date/Time     12/30/2024 05:47 AM    K 4.2 12/30/2024 05:47 AM     12/30/2024 05:47 AM    CO2 22 12/30/2024 05:47 AM    BUN 17 12/30/2024 05:47 AM    CREATININE 0.5 12/30/2024 05:47 AM    GLUCOSE 97 12/30/2024 05:47 AM    CALCIUM 9.3 12/30/2024 05:47 AM        Therapy progress:  Physical therapy:  Bed Mobility:     Sit>supine:  Assistance Level: Minimal assistance  Skilled Clinical Factors: for BLE, maintaining spinal px  Supine>sit:  Assistance Level: Contact guard assist  Skilled Clinical Factors: with cues for spinal precuations, HOB elevated  Transfers:  Surface: From bed, To chair with  medications as needed for regular bowel movements      Bladder: Check PVR x 3.  IMC if PVR > 200ml or if any volume is > 500 ml.      Sleep: melatonin provided prn.      PPX  DVT: heparin   GI: not indicated     Follow up appointments: PCP, Neurosurgery     Therapy progress: pending therapies today  Services: HH PT, OT, ST, nursing  DME: JOSEP  EDOD: 1/10/24    Monica Colindres MD  12/30/2024, 11:09 AM

## 2024-12-30 NOTE — PROGRESS NOTES
Physical Therapy  Facility/Department: [facility]  Rehabilitation Physical Therapy Treatment Note    NAME: Fabiana Ceballos  : 1945 (79 y.o.)  MRN: [id number]  CODE STATUS: Full Code    Date of Service: 24       Restrictions:  Restrictions/Precautions: Modified Diet, Fall Risk, Contact Precautions, General Precautions, ROM Restrictions  Required Braces or Orthoses  Spinal: Thoracic Lumbar Sacral Orthotics  Spinal Other: when OOB, may don at EOB  Position Activity Restriction  Spinal Precautions: No Lifting, No Twisting, No Bending  Spinal Precautions Comments: Per pt/family pt with spinal px and to wear TLSO until mid february  Other Position/Activity Restrictions: TLSO, soft and bite sized, recent back surgery 2024, L scalp incision with staples, contact-Cdiff, spinal     SUBJECTIVE  Subjective: pt found in bed, RN attending to pt's needs  Pain: 5/10 pain PEARSON, RN provided pt with medication       OBJECTIVE  Cognition  Overall Cognitive Status: Exceptions  Arousal/Alertness: Delayed responses to stimuli;Inconsistent responses to stimuli  Following Commands: Follows one step commands with increased time  Attention Span: Attends with cues to redirect;Difficulty attending to directions;Difficulty dividing attention  Memory: Impaired;Decreased short term memory;Decreased recall of recent events;Decreased recall of precautions  Safety Judgement: Decreased awareness of need for assistance;Decreased awareness of need for safety  Problem Solving: Assistance required to correct errors made;Assistance required to identify errors made;Assistance required to implement solutions;Assistance required to generate solutions  Insights: Decreased awareness of deficits  Initiation: Requires cues for some  Sequencing: Requires cues for some  Orientation  Overall Orientation Status: Impaired  Orientation Level: Oriented to person;Disoriented to place;Disoriented to time;Disoriented to situation    Functional Mobility  Supine  training;Neuromuscular re-education;Manual;Cognitive reorientation;Home exercise program;Safety education & training;Patient/Caregiver education & training;Equipment evaluation, education, & procurement;Modalities;Positioning;Therapeutic activities  Safety Devices  Type of Devices: Patient at risk for falls;All fall risk precautions in place;Left in bed;Bed alarm in place;Call light within reach;Nurse notified;Gait belt    EDUCATION  Education  Education Given To: Patient  Education Provided: Role of Therapy;Plan of Care;Precautions;Safety;Transfer Training;Fall Prevention Strategies;Mobility Training;Cognition  Education Provided Comments: Role of PT, safe progression of mobility, use of RW, TLSO and spinal px.  Education Method: Verbal  Barriers to Learning: Cognition;Hearing  Education Outcome: Continued education needed;Verbalized understanding        Therapy Time   Individual Concurrent Group Co-treatment   Time In 0830         Time Out 0930         Minutes 60           Timed Code Treatment Minutes: 60 Minutes       Debora Jack PT, 12/30/24 at 9:21 AM

## 2024-12-30 NOTE — PROGRESS NOTES
After tossing ball, pt became very lethargic and sleepy and unable to keep eyes open or attend to R side to collect clothespins on R side while being propelled in w/c back to room. Continue POC.  Activity Tolerance: Patient limited by fatigue;Patient limited by endurance;Treatment limited secondary to decreased cognition  Discharge Recommendations: 24 hour supervision or assist;Home with Home health OT;S Level 4  OT Equipment Recommendations  Other: TBD  Safety Devices  Safety Devices in place: Yes  Type of devices: Gait belt;Call light within reach;Nurse notified;Bed alarm in place;Left in bed    Patient Education  Education  Education Given To: Patient  Education Provided: Role of Therapy;Plan of Care;Precautions;Safety;ADL Function;Transfer Training;Fall Prevention Strategies;Energy Conservation;Cognition;Mobility Training  Education Provided Comments: bed mobility, attention to task, sitting balance, hand placement for sit to stand transfers, standing balance, safety during dressing/toileting.  Education Method: Verbal;Demonstration  Barriers to Learning: Cognition  Education Outcome: Verbalized understanding;Continued education needed;Demonstrated understanding    Plan  Occupational Therapy Plan  Times Per Week: 5-7x/wk  Current Treatment Recommendations: Strengthening;ROM;Balance training;Functional mobility training;Endurance training;Patient/Caregiver education & training;Safety education & training;Neuromuscular re-education;Equipment evaluation, education, & procurement;Self-Care / ADL;Home management training;Coordination training;Gait training;Cognitive reorientation;Pain management;Positioning;Cognitive/Perceptual training    Goals  Short Term Goals  Time Frame for Short Term Goals: 1/1/25  Short Term Goal 1: Pt will perform toilet transfer with min A  Short Term Goal 2: Pt will perform toileting task with mod A  Short Term Goal 3: Pt will perform LB dressing with mod A  Short Term Goal 4: Pt will  perform LB bathing with CGA and utilizing AE PRN  Short Term Goal 5: Pt will perform 5 x 20 reps BUE ex to incr strength and activity tolerance for ADLs and fxl transfers  Long Term Goals  Time Frame for Long Term Goals : 1/06/25  Long Term Goal 1: Pt will perform toilet transfer mod I  Long Term Goal 2: Pt will perform TB dressing with mod I  Long Term Goal 3: Pt will perform TB bathing with  mod I  Long Term Goal 4: Pt will perform simple IADL task with mod I    Therapy Time   Individual Concurrent Group Co-treatment   Time In 1400         Time Out 1500         Minutes 60         Timed Code Treatment Minutes: 60 Minutes       Nam Pugh, OT

## 2024-12-30 NOTE — PROGRESS NOTES
with FWW. Patient with posterior lean requiring Min A to maintain static standing using FWW.  Putting On/Taking Off Footwear     Toileting  Assistance Level: Dependent  Skilled Clinical Factors: incontinent of bladder requiring full assist for clothing management over hips and pericare. Patient stands using FWW with Min A    Speech therapy:    ADULT DIET; Dysphagia - Soft and Bite Sized  ADULT ORAL NUTRITION SUPPLEMENT; Dinner; Standard High Calorie/High Protein Oral Supplement    Body mass index is 27.16 kg/m².    Assessment and Plan:  Fabiana Ceballos is an 79 year old female with a past medical history significant for CAD s/p PCI, prior DVT (after TKA), HTN, HLD, T5-sacral fusion (8/2024) who presented to [facility] on 12/18/24 from clinic after a fall with headache and AMS. She was found to have left SDH s/p left craniotomy. She was admitted to [facility] on 12/24/24 due to functional deficits below her baseline.      Left SDH  - s/p left craniotomy  - goal SBP<160  - on lower dose of home gabapentin  - completed 7 day course of Keppra prophylaxis per Neurosurgery  - PT, OT, ST    R shoulder pain  B/l knee pain  Start voltaren 4g QID    Hypokalemia  - 20 BID for 3 days starting 12/29    Hypomagnesemia  - start daily replacement      Dysphagia  - on modified diet  - ST    C diff  - consulted GI, appreciate assistance. Continue dificid for 14 day course  -GI note reviewed- continue dificid 12/28     CAD  - history of stents   - ok to resume home asa on 12/27  - statin     HTN  - continue metoprolol  - resumed lisinopril  - holding home HCTZ     Hypothyroidism  - levothyoxine      GERD  - pantoprazole     Bowels: adjust medications as needed for regular bowel movements      Bladder: Check PVR x 3.  IMC if PVR > 200ml or if any volume is > 500 ml.      Sleep: melatonin provided prn.      PPX  DVT: heparin   GI: not indicated     Follow up appointments: PCP, Neurosurgery     Therapy progress: demonstrated mild improvement with  balance and tolerance to stand at sink for 4-5 minutes with CGA for grooming tasks   Services: HH PT, OT, ST, nursing  DME: TBD  EDOD: 1/10/24    Douglas Wooten DO  12/29/2024, 8:09 PM

## 2024-12-30 NOTE — PROGRESS NOTES
[facility]: ACUTE REHAB UNIT  SPEECH-LANGUAGE PATHOLOGY      [x] Daily  [] Weekly Care Conference Note  [] Discharge    Patient:Fabiana Ceballos      :1945  MRN:[id number]  Rehab Dx/Hx: SDH (subdural hematoma) [S06.5XAA]    Precautions: falls, contact   Home situation: lives alone. Indep with med management, finances, and all household tasks.  ST Dx: [] Aphasia  [] Dysarthria  [] Apraxia   [x] Oropharyngeal dysphagia [x] Cognitive Impairment  [] Other:   Date of Admit: 2024  Room #: 0155/0155-01    Current functional status (updated daily):         Pt being seen for : [] Speech/Language Treatment  [x] Dysphagia Treatment [x] Cognitive Treatment  [] Other:  Communication: [x]WFL  [] Aphasia  [] Dysarthria  [] Apraxia  [] Pragmatic Impairment [] Non-verbal  [] Hearing Loss  [] Other:   Cognition: [] WFL  [] Mild  [x] Moderate  [x] Severe [] Unable to Assess  [] Other:  Memory: [] WFL  [] Mild  [x] Moderate  [x] Severe [] Unable to Assess  [] Other:  Behavior: [] Alert  [x] Cooperative  [x]  Pleasant  [] Confused  [] Agitated  [] Uncooperative  [] Distractible [] Motivated  [] Self-Limiting [] Anxious  [] Other:  Endurance:  [] Adequate for participation in SLP sessions  [x] Reduced overall  [x] Lethargic  [] Other:  Safety: [] No concerns at this time  [x] Reduced insight into deficits  [x]  Reduced safety awareness [] Not following call light procedures  [] Unable to Assess  [] Other:    Current Diet Order:ADULT DIET; Dysphagia - Soft and Bite Sized  ADULT ORAL NUTRITION SUPPLEMENT; Dinner; Standard High Calorie/High Protein Oral Supplement  Swallowing Precautions: upright for all intake, stay upright for at least 30 mins after intake, small bites/sips, close supervision, oral care 2-3x/day to reduce adverse affects in the event of aspiration, increase physical mobility as able, alternate bites/sips, slow rate of intake, general GERD precautions, general aspiration precautions, and hold PO and contact SLP if s/s

## 2024-12-30 NOTE — PLAN OF CARE
Problem: Safety - Adult  Goal: Free from fall injury  12/30/2024 1029 by Rylan Alfaro, RN  Outcome: Progressing  12/29/2024 2319 by Shima Chau, RN  Outcome: Progressing     Problem: Pain  Goal: Verbalizes/displays adequate comfort level or baseline comfort level  12/30/2024 1029 by Rylan Alfaro, RN  Outcome: Progressing  12/29/2024 2319 by Shima Chau, RN  Outcome: Progressing

## 2024-12-31 PROBLEM — Z86.79 HISTORY OF SUBDURAL HEMATOMA: Status: ACTIVE | Noted: 2024-12-31

## 2024-12-31 PROBLEM — G93.40 ACUTE ENCEPHALOPATHY: Status: ACTIVE | Noted: 2024-12-31

## 2024-12-31 PROBLEM — G93.89 PNEUMOCEPHALUS: Status: ACTIVE | Noted: 2024-12-31

## 2024-12-31 PROBLEM — Z86.73 HISTORY OF STROKE: Status: ACTIVE | Noted: 2024-12-31

## 2024-12-31 LAB — 25(OH)D3 SERPL-MCNC: 37.9 NG/ML

## 2024-12-31 PROCEDURE — 36415 COLL VENOUS BLD VENIPUNCTURE: CPT

## 2024-12-31 PROCEDURE — 97535 SELF CARE MNGMENT TRAINING: CPT

## 2024-12-31 PROCEDURE — 99222 1ST HOSP IP/OBS MODERATE 55: CPT | Performed by: STUDENT IN AN ORGANIZED HEALTH CARE EDUCATION/TRAINING PROGRAM

## 2024-12-31 PROCEDURE — 97129 THER IVNTJ 1ST 15 MIN: CPT

## 2024-12-31 PROCEDURE — 92526 ORAL FUNCTION THERAPY: CPT

## 2024-12-31 PROCEDURE — 97116 GAIT TRAINING THERAPY: CPT

## 2024-12-31 PROCEDURE — 82306 VITAMIN D 25 HYDROXY: CPT

## 2024-12-31 PROCEDURE — 1280000000 HC REHAB R&B

## 2024-12-31 PROCEDURE — APPSS45 APP SPLIT SHARED TIME 31-45 MINUTES

## 2024-12-31 PROCEDURE — 97110 THERAPEUTIC EXERCISES: CPT

## 2024-12-31 PROCEDURE — 95816 EEG AWAKE AND DROWSY: CPT | Performed by: PSYCHIATRY & NEUROLOGY

## 2024-12-31 PROCEDURE — 95819 EEG AWAKE AND ASLEEP: CPT

## 2024-12-31 PROCEDURE — 6360000002 HC RX W HCPCS: Performed by: STUDENT IN AN ORGANIZED HEALTH CARE EDUCATION/TRAINING PROGRAM

## 2024-12-31 PROCEDURE — 97130 THER IVNTJ EA ADDL 15 MIN: CPT

## 2024-12-31 PROCEDURE — 97530 THERAPEUTIC ACTIVITIES: CPT

## 2024-12-31 PROCEDURE — 6370000000 HC RX 637 (ALT 250 FOR IP): Performed by: STUDENT IN AN ORGANIZED HEALTH CARE EDUCATION/TRAINING PROGRAM

## 2024-12-31 RX ADMIN — OXYCODONE HYDROCHLORIDE AND ACETAMINOPHEN 500 MG: 500 TABLET ORAL at 20:21

## 2024-12-31 RX ADMIN — OXYCODONE HYDROCHLORIDE AND ACETAMINOPHEN 500 MG: 500 TABLET ORAL at 09:28

## 2024-12-31 RX ADMIN — OXYCODONE 5 MG: 5 TABLET ORAL at 20:21

## 2024-12-31 RX ADMIN — HEPARIN SODIUM 5000 UNITS: 5000 INJECTION INTRAVENOUS; SUBCUTANEOUS at 20:20

## 2024-12-31 RX ADMIN — Medication 1 TABLET: at 09:28

## 2024-12-31 RX ADMIN — Medication 5 MG: at 20:22

## 2024-12-31 RX ADMIN — DICLOFENAC SODIUM TOPICAL GEL, 1% 4 G: 10 GEL TOPICAL at 16:40

## 2024-12-31 RX ADMIN — CETIRIZINE HYDROCHLORIDE 5 MG: 5 TABLET ORAL at 09:28

## 2024-12-31 RX ADMIN — DICLOFENAC SODIUM TOPICAL GEL, 1% 4 G: 10 GEL TOPICAL at 20:27

## 2024-12-31 RX ADMIN — METOPROLOL TARTRATE 25 MG: 25 TABLET, FILM COATED ORAL at 20:21

## 2024-12-31 RX ADMIN — AMOXICILLIN AND CLAVULANATE POTASSIUM 1 TABLET: 875; 125 TABLET, FILM COATED ORAL at 20:21

## 2024-12-31 RX ADMIN — Medication 1 TABLET: at 20:21

## 2024-12-31 RX ADMIN — HEPARIN SODIUM 5000 UNITS: 5000 INJECTION INTRAVENOUS; SUBCUTANEOUS at 14:33

## 2024-12-31 RX ADMIN — ATORVASTATIN CALCIUM 10 MG: 10 TABLET, FILM COATED ORAL at 09:28

## 2024-12-31 RX ADMIN — FIDAXOMICIN 200 MG: 200 TABLET, FILM COATED ORAL at 20:46

## 2024-12-31 RX ADMIN — GABAPENTIN 200 MG: 100 CAPSULE ORAL at 14:33

## 2024-12-31 RX ADMIN — GABAPENTIN 200 MG: 100 CAPSULE ORAL at 05:01

## 2024-12-31 RX ADMIN — ASPIRIN 81 MG 81 MG: 81 TABLET ORAL at 09:28

## 2024-12-31 RX ADMIN — GABAPENTIN 200 MG: 100 CAPSULE ORAL at 20:21

## 2024-12-31 RX ADMIN — LEVOTHYROXINE SODIUM 50 MCG: 0.05 TABLET ORAL at 05:01

## 2024-12-31 RX ADMIN — GABAPENTIN 200 MG: 100 CAPSULE ORAL at 09:28

## 2024-12-31 RX ADMIN — FLUTICASONE PROPIONATE 1 SPRAY: 50 SPRAY, METERED NASAL at 20:27

## 2024-12-31 RX ADMIN — DICLOFENAC SODIUM TOPICAL GEL, 1% 4 G: 10 GEL TOPICAL at 13:59

## 2024-12-31 RX ADMIN — LISINOPRIL 10 MG: 10 TABLET ORAL at 20:22

## 2024-12-31 RX ADMIN — CIPROFLOXACIN HYDROCHLORIDE 500 MG: 500 TABLET, FILM COATED ORAL at 09:28

## 2024-12-31 RX ADMIN — Medication 400 MG: at 09:27

## 2024-12-31 RX ADMIN — PANTOPRAZOLE SODIUM 40 MG: 40 TABLET, DELAYED RELEASE ORAL at 05:01

## 2024-12-31 RX ADMIN — FIDAXOMICIN 200 MG: 200 TABLET, FILM COATED ORAL at 09:31

## 2024-12-31 RX ADMIN — HEPARIN SODIUM 5000 UNITS: 5000 INJECTION INTRAVENOUS; SUBCUTANEOUS at 05:01

## 2024-12-31 ASSESSMENT — PAIN DESCRIPTION - FREQUENCY: FREQUENCY: INTERMITTENT

## 2024-12-31 ASSESSMENT — PAIN DESCRIPTION - PAIN TYPE: TYPE: ACUTE PAIN;SURGICAL PAIN

## 2024-12-31 ASSESSMENT — PAIN SCALES - GENERAL
PAINLEVEL_OUTOF10: 6
PAINLEVEL_OUTOF10: 4
PAINLEVEL_OUTOF10: 0

## 2024-12-31 ASSESSMENT — PAIN SCALES - WONG BAKER
WONGBAKER_NUMERICALRESPONSE: HURTS EVEN MORE
WONGBAKER_NUMERICALRESPONSE: HURTS LITTLE MORE

## 2024-12-31 ASSESSMENT — PAIN - FUNCTIONAL ASSESSMENT: PAIN_FUNCTIONAL_ASSESSMENT: PREVENTS OR INTERFERES SOME ACTIVE ACTIVITIES AND ADLS

## 2024-12-31 ASSESSMENT — PAIN DESCRIPTION - ONSET: ONSET: ON-GOING

## 2024-12-31 ASSESSMENT — PAIN DESCRIPTION - LOCATION: LOCATION: GENERALIZED

## 2024-12-31 ASSESSMENT — PAIN DESCRIPTION - DESCRIPTORS: DESCRIPTORS: ACHING;DISCOMFORT;SORE

## 2024-12-31 NOTE — PROGRESS NOTES
Occupational Therapy  Facility/Department: [facility]  Rehabilitation Occupational Therapy Daily Treatment Note    Date: 24  Patient Name: Fabiana Ceballos       Room: 0155/0155-01  MRN: [id number]  Account: [id number]   : 1945  (79 y.o.) Gender: female            Pt was bathed during OT session this date. Pt was Showered with soap/water with Total Assist.          Past Medical History:  has a past medical history of Anesthesia, Arthritis, Hyperlipidemia, Hypertension, Lumbar herniated disc, Lumbar spondylosis, Lumbar stenosis, Prediabetes, Radiculopathy of lumbar region, Scoliosis, and Thyroid disease.  Past Surgical History:   has a past surgical history that includes Hysterectomy; Cholecystectomy; bladder repair; Colonoscopy; lumbar discectomy (2011); Total knee arthroplasty (Right, 2023); Coronary angioplasty; and Total knee arthroplasty (Right, 2023).    Restrictions  Restrictions/Precautions: Modified Diet, Fall Risk, Contact Precautions, General Precautions, ROM Restrictions  Spinal Precautions Comments: Per pt/family pt with spinal px and to wear TLSO until mid february  Other Position/Activity Restrictions: TLSO, soft and bite sized, recent back surgery 2024, L scalp incision with staples, contact-Cdiff, spinal  Required Braces or Orthoses  Spinal: Thoracic Lumbar Sacral Orthotics  Spinal Other: when OOB, may don at EOB  Required Braces or Orthoses?: Yes    Subjective  Subjective: Pt in bed, receiving medication from RN, lethargic, agreeable to OT session, no s/s of pain, /62, , O2 sats 97% on RA.  Restrictions/Precautions: Modified Diet;Fall Risk;Contact Precautions;General Precautions;ROM Restrictions             Objective     Cognition  Overall Cognitive Status: Exceptions  Arousal/Alertness: Delayed responses to stimuli;Inconsistent responses to stimuli  Following Commands: Follows one step commands with increased time  Attention Span: Attends with cues to  cues;Hand placement cues;Increased time to complete;With handrails  Device: Walker  Sit to Stand  Assistance Level: Maximum assistance  Skilled Clinical Factors: CGA from EOB at start of session, min A from toilet and min A from w/c at end of session, max A to stand from TTB  Stand to Sit  Assistance Level: Maximum assistance  Skilled Clinical Factors: ranging from CGA to max A during session  Bed To/From Chair  Technique: Stand step  Assistance Level: Moderate assistance  Skilled Clinical Factors: CGA/min A at start of session for stand step bed>toilet, mod A at end of session w/c>bed for stand pivot  Stand Pivot  Assistance Level: Dependent  Skilled Clinical Factors: max A of 2 stand pivot TTB>w/c, mod A for w/c>bed         Assessment  Assessment  Assessment: Pt agreeable to OT session. Pt demonstrated much improved strength for transfers and toileting at start of session with CGA to walk to bathroom and min A to complete sit<>stand from toilet. Pt demonstrated good sequencing and coordination to complete toileting with min A this date. Pt fatigued very quickly during session with pt falling asleep/not opening eyes consistently on TTB during shower. Pt perseverating on washing hair and face in shower with more than 5 minutes completing washing hair and face and task required Chinik termination to wash the rest of the body. Pt able to don shirt and sweatshirt with mod A and no VCs for sequencing. Pt unable to attend to task enough to use a/e for LB dressing and task completed with total assist and max A of 2 to manage pants up in stance and pivot to w/c due to lethargy and poor command following. Pt required max VCs, max A, and termination of task for oral hygiene while seated in w/c at sink as well. Pt given ~5 minutes to rest in w/c with vitals checked and WFL. Pt completed stand pivot w/c>bed at end of session with mod A and performed sit>supine with min A. Pt asleep in less than 1 minute of lying down in bed.

## 2024-12-31 NOTE — PROGRESS NOTES
Tucker is an 79 year old female with a past medical history significant for CAD s/p PCI, prior DVT (after TKA), HTN, HLD, T5-sacral fusion (8/2024) who presented to [facility] on 12/18/24 from clinic after a fall with headache and AMS. She was found to have left SDH s/p left craniotomy. She was admitted to [facility] on 12/24/24 due to functional deficits below her baseline.      Left SDH  - s/p left craniotomy 12/19  - will determine timing of staple removal  - goal SBP<160  - on lower dose of home gabapentin  - completed 7 day course of Keppra prophylaxis per Neurosurgery  - PT, OT, ST  - increased fatigue and decreased participation 12/30. CT head obtained 12/30. Hold seroquel qhs. UA positive for UTI  - consult Neurology due to intermittent episodes of severe fatigue, question of related recovery process from brain bleed versus seizure. Or UTI    UTI  - culture positive for E coli  - sensitivities pending  - cipro    R shoulder pain  B/l knee pain  Startws voltaren 4g QID    Hypokalemia  - replace PRN    Hypomagnesemia  - continue daily replacement      Dysphagia  - on modified diet  - ST    C diff  - consulted GI, appreciate assistance. Continue dificid for 14 day course  -GI note reviewed- continue dificid 12/28     CAD  - history of stents   - ok to resume home asa on 12/27  - statin     HTN  - continue metoprolol  - resumed lisinopril  - holding home HCTZ     Hypothyroidism  - levothyoxine      GERD  - pantoprazole     Bowels: adjust medications as needed for regular bowel movements      Bladder: Check PVR x 3.  IMC if PVR > 200ml or if any volume is > 500 ml.      Sleep: melatonin provided prn.      PPX  DVT: heparin   GI: not indicated     Follow up appointments: PCP, Neurosurgery     Therapy progress: demonstrated much improved strength for transfers and toileting at start of session with CGA to walk to bathroom and min A to complete sit<>stand from toilet   Services: HH PT, OT, ST, nursing  DME: TBD  EDOD:  1/10/24    Monica Colindres MD  12/31/2024, 11:47 AM

## 2024-12-31 NOTE — CONSULTS
[facility]  Inpatient Neurology Consult  [facility] Neurology            Fabiana Ceballos  1945    Date of Service: 12/31/2024    Referring Physician: Monica Colindres MD      Reason for the consult and CC: AMS    HPI:   The patient is a 79 y.o.  years old female with a prior medical history of hyperlipidemia, hypertension, hypothyroid, CAD status post stents who was admitted to ARU on 12/24 for rehab status post bilateral SDH.  Patient previously had a fall with headache and altered mental status on 12/18 and was diagnosed with bilateral subdural hematomas, 1.2 cm on the left and 0.6 cm on the right.  Patient was admitted to  and underwent a left craniotomy on 12/19.  Patient was put on Keppra for 7 days postoperatively.  An EEG was completed there for persistent postoperative aphasia but there is no evidence of seizure activity.  Per nursing staff, patient was more confused and less interactive yesterday.  He says that he could not understand her speech and that she was \"speaking gibberish\" and could not feed herself. This morning, patient is more somnolent and was unable to participate in therapy.   urinalysis was completed yesterday which was positive for UTI and patient was started on Cipro. She is also being treated for C. difficile. A CT head was completed yesterday which revealed postoperative changes but no acute findings.  Per chart review, patient is typically alert although she has had some mild confusion.  A SLP [facility] on 12/25 noted patient to be oriented with moderate to severe memory deficits and mild attention deficits, as well as mild dysarthria and mild receptive language deficits.        Constitutional:   Vitals:    12/31/24 0807 12/31/24 0915 12/31/24 0950 12/31/24 1042   BP: 133/77 101/62 101/62 (!) 140/85   Pulse: (!) 103 (!) 108 (!) 108 98   Resp:  18     Temp:  97.3 °F (36.3 °C)     TempSrc:  Oral     SpO2: 97% 97%  96%   Weight:       Height:             I personally

## 2024-12-31 NOTE — PROGRESS NOTES
pt progresing to sba for bed mobility, CGA for transfers (1 instance of max a to safely return to w/c upon stnading 2* LOB, more successful in later attempts) and CGA-min A for gait up to 55 ft with RW. if pt does not have 24/7 supv/assist and family trainign prior to d/c, rec SNF for increased safety and continued skilled PT.  Activity Tolerance: Patient limited by fatigue;Patient limited by endurance;Treatment limited secondary to decreased cognition  Discharge Recommendations: 24 hour supervision or assist;Home with Home health PT  PT Equipment Recommendations  Equipment Needed: Yes  Mobility Devices: Walker  Walker: Rolling  Wheelchair: Standard  Other: Recommend wheelchair due to fluctuations in activity tolerance/arousal levels, elevated BP    Goals  Patient Goals   Patient Goals : \"get out of here\"  Short Term Goals  Time Frame for Short Term Goals: 1 week 12/30/2024  Short Term Goal 1: Pt will complete bed mobility with CGA- GOAL MET  Short Term Goal 2: Pt will complete functional t/f with LRAD with SBA- GOA LNOT MET< CGA  Short Term Goal 3: Pt will ambulate >50' with LRAD wtih SBA without LOB- GOAL NOT MET, CGA  Short Term Goal 4: Pt will ascend/descend curb step with LRAD with CGA- GOAL NOT MET< NT 2* fatigue  Short Term Goal 5: Pt will tolerate formal balance assessment and appropriate goal to be set - 12/28 Not met d/t cognition, arousal levels  Long Term Goals  Time Frame for Long Term Goals : 14 days 1/06/2025  Long Term Goal 1: Pt will complete bed mobility with Bekah  Long Term Goal 2: Pt will complete STS with LRAD with Bekah  Long Term Goal 3: Pt will complete functional t/f with LRAD with Sup  Long Term Goal 4: Pt will ambulate >150' with LRAD with SUP without LOB  Long Term Goal 5: Pt will ascend/descend curb step with LRAD with SUP    PLAN OF CARE/SAFETY  Physical Therapy Plan  General Plan: 5-7 times per week  Current Treatment Recommendations: Strengthening;ROM;Balance training;Functional  mobility training;Transfer training;Endurance training;Wheelchair mobility training;Gait training;Stair training;Neuromuscular re-education;Manual;Cognitive reorientation;Home exercise program;Safety education & training;Patient/Caregiver education & training;Equipment evaluation, education, & procurement;Modalities;Positioning;Therapeutic activities  Safety Devices  Type of Devices: Patient at risk for falls;All fall risk precautions in place;Left in bed;Bed alarm in place;Call light within reach;Nurse notified;Gait belt    EDUCATION  Education  Education Given To: Patient  Education Provided: Role of Therapy;Plan of Care;Precautions;Safety;Transfer Training;Fall Prevention Strategies;Mobility Training;Cognition  Education Provided Comments: Role of PT, safe progression of mobility, use of RW, TLSO and spinal px.  Education Method: Verbal  Barriers to Learning: Cognition;Hearing  Education Outcome: Continued education needed;Verbalized understanding        Therapy Time   Individual Concurrent Group Co-treatment   Time In 0800         Time Out 0900         Minutes 60           Timed Code Treatment Minutes: 60 Minutes       Debora Jack PT, 12/31/24 at 11:33 AM

## 2024-12-31 NOTE — PLAN OF CARE
Problem: Pain  Goal: Verbalizes/displays adequate comfort level or baseline comfort level  12/30/2024 2300 by Luly Wooten RN  Outcome: Progressing  Flowsheets (Taken 12/30/2024 2300)  Verbalizes/displays adequate comfort level or baseline comfort level:   Encourage patient to monitor pain and request assistance   Assess pain using appropriate pain scale   Administer analgesics based on type and severity of pain and evaluate response   Implement non-pharmacological measures as appropriate and evaluate response

## 2024-12-31 NOTE — PROGRESS NOTES
[facility]: ACUTE REHAB UNIT  SPEECH-LANGUAGE PATHOLOGY      [x] Daily  [] Weekly Care Conference Note  [] Discharge    Patient:Fabiana Ceballos      :1945  MRN:[id number]  Rehab Dx/Hx: SDH (subdural hematoma) [S06.5XAA]    Precautions: falls, contact   Home situation: lives alone. Indep with med management, finances, and all household tasks.  ST Dx: [] Aphasia  [] Dysarthria  [] Apraxia   [x] Oropharyngeal dysphagia [x] Cognitive Impairment  [] Other:   Date of Admit: 2024  Room #: 0155/0155-01    Current functional status (updated daily):         Pt being seen for : [] Speech/Language Treatment  [x] Dysphagia Treatment [x] Cognitive Treatment  [] Other:  Communication: [x]WFL  [] Aphasia  [] Dysarthria  [] Apraxia  [] Pragmatic Impairment [] Non-verbal  [] Hearing Loss  [] Other:   Cognition: [] WFL  [] Mild  [x] Moderate  [x] Severe [] Unable to Assess  [] Other:  Memory: [] WFL  [] Mild  [x] Moderate  [x] Severe [] Unable to Assess  [] Other:  Behavior: [] Alert  [x] Cooperative  [x]  Pleasant  [] Confused  [] Agitated  [] Uncooperative  [] Distractible [] Motivated  [] Self-Limiting [] Anxious  [] Other:  Endurance:  [] Adequate for participation in SLP sessions  [x] Reduced overall  [x] Lethargic  [] Other:  Safety: [] No concerns at this time  [x] Reduced insight into deficits  [x]  Reduced safety awareness [] Not following call light procedures  [] Unable to Assess  [] Other:    Current Diet Order:ADULT DIET; Dysphagia - Soft and Bite Sized  ADULT ORAL NUTRITION SUPPLEMENT; Dinner; Standard High Calorie/High Protein Oral Supplement  Swallowing Precautions: upright for all intake, stay upright for at least 30 mins after intake, small bites/sips, close supervision, oral care 2-3x/day to reduce adverse affects in the event of aspiration, increase physical mobility as able, alternate bites/sips, slow rate of intake, general GERD precautions, general aspiration precautions, and hold PO and contact SLP if s/s

## 2024-12-31 NOTE — PATIENT CARE CONFERENCE
[facility]  Inpatient Rehabilitation  Weekly Team Conference Note    Date: 2025  Patient Name: Fabiana Ceballos        MRN: [id number]    : 1945  (79 y.o.)  Gender: female   Referring Practitioner: Monica Colindres MD  Diagnosis: Fall, SDH s/p craniotomy with evacuation      Interventions to be utilized toward barriers to discharge, per discipline:  NURSING  Nursing observed barriers to dc: Pain, Cognitive deficit, Impulsivity, Limited safety awareness, Limited insight into deficits, Decreased endurance, Upper extremity weakness, Lower extremity weakness, Impaired vision, Medication managment, and limited assistance at home.  Nursing interventions: assist w/ ADLs, pain control/medication management, increase strength/endurance to improve balance and mobility, impulsive and disoriented at times but, easily reoriented   Family Education: No  Fall Risk:  Yes    Stay with me?: Yes    PHYSICAL THERAPY  Physical therapy observed barriers to dc:     Baseline: Lives alone, multi-story home, ramped entrance, step into bathroom. Avery mobility and gait with RW              Current level: SBA bed mobility, CGA-min A transfers/gait up to 50 ft, unable to complete steps 2* participation/drowsiness               Barriers to DC: lives alone, falls, TLOS/spinal px, motor control, slow processing, decreased strength, balance, activity tolerance and safety              Needs in order to achieve dc home/next level of care: Will need to be SBA or better mobility and gait with RW, able to complete curb step to d/c home. Will likely need 24hrA. DME possibly manual w/c or transport chair dependent on progression of gait.      Physical therapy interventions:   Current Treatment Recommendations: Strengthening, ROM, Balance training, Functional mobility training, Transfer training, Endurance training, Wheelchair mobility training, Gait training, Stair training, Neuromuscular re-education, Manual, Cognitive  appropriate.        Interdisciplinary Goals:   1.) Pt will complete toileting with mod A.  2.) pt will complete functional transfers with Sba and LRAD  3.) Pt will carryover use of compensatory strategies for improved recall, safety, and insight across all disciplines given min cues   4.)  5.)        [x]  Family Training discussed at conference and to be scheduled. To be scheduled the week of January 6th.         Discharge Plan   Estimated discharge date: 1/10/24  Destination: home health  Pass: No  Services at Discharge: Home Health  Physical Therapy, Occupational Therapy, and Speech Therapy   Equipment at Discharge: Rolling Walker, Wheelchair    Team Members Present at Conference:  : Rajat Rivera RN     Occupational Therapist: Nam Pugh OTR/L  Physical Therapist: Debora Jack PT, DPT   Speech Therapist: Winter Suh MA CCC-SLP  Nurse: Iker Timmons RN  Dietician: Elda Cano RD, LD  Scribe:ESTEFANIA Wu/ALIZE  : ESTEFANIA Wu/L  Psychiatry: N/A    Family members present at conference: No      I led this team conference and I approve the established interdisciplinary plan of care as documented within the medical record of Fabiana Ceballos.    MD: Electronically signed by Monica Colindres MD on 1/2/2025 at 11:58 AM

## 2025-01-01 ENCOUNTER — APPOINTMENT (OUTPATIENT)
Dept: CT IMAGING | Age: 80
DRG: 949 | End: 2025-01-01
Attending: STUDENT IN AN ORGANIZED HEALTH CARE EDUCATION/TRAINING PROGRAM
Payer: MEDICARE

## 2025-01-01 LAB
ANION GAP SERPL CALCULATED.3IONS-SCNC: 10 MMOL/L (ref 3–16)
BASOPHILS # BLD: 0 K/UL (ref 0–0.2)
BASOPHILS NFR BLD: 0.5 %
BUN SERPL-MCNC: 17 MG/DL (ref 7–20)
CALCIUM SERPL-MCNC: 9.4 MG/DL (ref 8.3–10.6)
CHLORIDE SERPL-SCNC: 104 MMOL/L (ref 99–110)
CO2 SERPL-SCNC: 24 MMOL/L (ref 21–32)
CREAT SERPL-MCNC: 0.6 MG/DL (ref 0.6–1.2)
DEPRECATED RDW RBC AUTO: 18.8 % (ref 12.4–15.4)
EOSINOPHIL # BLD: 0.2 K/UL (ref 0–0.6)
EOSINOPHIL NFR BLD: 1.9 %
GFR SERPLBLD CREATININE-BSD FMLA CKD-EPI: 90 ML/MIN/{1.73_M2}
GLUCOSE SERPL-MCNC: 107 MG/DL (ref 70–99)
HCT VFR BLD AUTO: 36.1 % (ref 36–48)
HGB BLD-MCNC: 11.9 G/DL (ref 12–16)
LYMPHOCYTES # BLD: 1.3 K/UL (ref 1–5.1)
LYMPHOCYTES NFR BLD: 14.5 %
MCH RBC QN AUTO: 27.5 PG (ref 26–34)
MCHC RBC AUTO-ENTMCNC: 33 G/DL (ref 31–36)
MCV RBC AUTO: 83.2 FL (ref 80–100)
MONOCYTES # BLD: 0.6 K/UL (ref 0–1.3)
MONOCYTES NFR BLD: 7 %
NEUTROPHILS # BLD: 7 K/UL (ref 1.7–7.7)
NEUTROPHILS NFR BLD: 76.1 %
PLATELET # BLD AUTO: 304 K/UL (ref 135–450)
PMV BLD AUTO: 8 FL (ref 5–10.5)
POTASSIUM SERPL-SCNC: 4.1 MMOL/L (ref 3.5–5.1)
RBC # BLD AUTO: 4.33 M/UL (ref 4–5.2)
SODIUM SERPL-SCNC: 138 MMOL/L (ref 136–145)
WBC # BLD AUTO: 9.2 K/UL (ref 4–11)

## 2025-01-01 PROCEDURE — 1280000000 HC REHAB R&B

## 2025-01-01 PROCEDURE — 6370000000 HC RX 637 (ALT 250 FOR IP): Performed by: STUDENT IN AN ORGANIZED HEALTH CARE EDUCATION/TRAINING PROGRAM

## 2025-01-01 PROCEDURE — 6360000002 HC RX W HCPCS: Performed by: PHYSICAL MEDICINE & REHABILITATION

## 2025-01-01 PROCEDURE — 2580000003 HC RX 258: Performed by: PHYSICAL MEDICINE & REHABILITATION

## 2025-01-01 PROCEDURE — 70450 CT HEAD/BRAIN W/O DYE: CPT

## 2025-01-01 PROCEDURE — 36415 COLL VENOUS BLD VENIPUNCTURE: CPT

## 2025-01-01 PROCEDURE — 80048 BASIC METABOLIC PNL TOTAL CA: CPT

## 2025-01-01 PROCEDURE — 6360000002 HC RX W HCPCS: Performed by: STUDENT IN AN ORGANIZED HEALTH CARE EDUCATION/TRAINING PROGRAM

## 2025-01-01 PROCEDURE — 85025 COMPLETE CBC W/AUTO DIFF WBC: CPT

## 2025-01-01 RX ADMIN — OXYCODONE 5 MG: 5 TABLET ORAL at 20:08

## 2025-01-01 RX ADMIN — HEPARIN SODIUM 5000 UNITS: 5000 INJECTION INTRAVENOUS; SUBCUTANEOUS at 13:33

## 2025-01-01 RX ADMIN — ASPIRIN 81 MG 81 MG: 81 TABLET ORAL at 09:02

## 2025-01-01 RX ADMIN — HEPARIN SODIUM 5000 UNITS: 5000 INJECTION INTRAVENOUS; SUBCUTANEOUS at 20:13

## 2025-01-01 RX ADMIN — DICLOFENAC SODIUM TOPICAL GEL, 1% 4 G: 10 GEL TOPICAL at 17:22

## 2025-01-01 RX ADMIN — CETIRIZINE HYDROCHLORIDE 5 MG: 5 TABLET ORAL at 09:01

## 2025-01-01 RX ADMIN — METOPROLOL TARTRATE 25 MG: 25 TABLET, FILM COATED ORAL at 09:02

## 2025-01-01 RX ADMIN — FIDAXOMICIN 200 MG: 200 TABLET, FILM COATED ORAL at 09:02

## 2025-01-01 RX ADMIN — Medication 400 MG: at 09:01

## 2025-01-01 RX ADMIN — GABAPENTIN 200 MG: 100 CAPSULE ORAL at 13:33

## 2025-01-01 RX ADMIN — METOPROLOL TARTRATE 25 MG: 25 TABLET, FILM COATED ORAL at 20:08

## 2025-01-01 RX ADMIN — LISINOPRIL 10 MG: 10 TABLET ORAL at 20:08

## 2025-01-01 RX ADMIN — GABAPENTIN 200 MG: 100 CAPSULE ORAL at 09:01

## 2025-01-01 RX ADMIN — Medication 5 MG: at 20:08

## 2025-01-01 RX ADMIN — Medication 1 TABLET: at 09:01

## 2025-01-01 RX ADMIN — OXYCODONE HYDROCHLORIDE AND ACETAMINOPHEN 500 MG: 500 TABLET ORAL at 20:08

## 2025-01-01 RX ADMIN — HEPARIN SODIUM 5000 UNITS: 5000 INJECTION INTRAVENOUS; SUBCUTANEOUS at 05:27

## 2025-01-01 RX ADMIN — PANTOPRAZOLE SODIUM 40 MG: 40 TABLET, DELAYED RELEASE ORAL at 05:27

## 2025-01-01 RX ADMIN — MEROPENEM 1000 MG: 1 INJECTION INTRAVENOUS at 10:07

## 2025-01-01 RX ADMIN — OXYCODONE HYDROCHLORIDE AND ACETAMINOPHEN 500 MG: 500 TABLET ORAL at 09:01

## 2025-01-01 RX ADMIN — LEVOTHYROXINE SODIUM 50 MCG: 0.05 TABLET ORAL at 05:27

## 2025-01-01 RX ADMIN — DICLOFENAC SODIUM TOPICAL GEL, 1% 4 G: 10 GEL TOPICAL at 20:09

## 2025-01-01 RX ADMIN — MEROPENEM 1000 MG: 1 INJECTION INTRAVENOUS at 17:25

## 2025-01-01 RX ADMIN — ATORVASTATIN CALCIUM 10 MG: 10 TABLET, FILM COATED ORAL at 09:01

## 2025-01-01 RX ADMIN — DICLOFENAC SODIUM TOPICAL GEL, 1% 4 G: 10 GEL TOPICAL at 13:33

## 2025-01-01 RX ADMIN — GABAPENTIN 200 MG: 100 CAPSULE ORAL at 20:08

## 2025-01-01 RX ADMIN — DICLOFENAC SODIUM TOPICAL GEL, 1% 4 G: 10 GEL TOPICAL at 09:01

## 2025-01-01 RX ADMIN — FLUTICASONE PROPIONATE 1 SPRAY: 50 SPRAY, METERED NASAL at 20:10

## 2025-01-01 RX ADMIN — Medication 1 TABLET: at 20:08

## 2025-01-01 RX ADMIN — Medication 1 TABLET: at 09:02

## 2025-01-01 RX ADMIN — FIDAXOMICIN 200 MG: 200 TABLET, FILM COATED ORAL at 20:12

## 2025-01-01 RX ADMIN — LISINOPRIL 10 MG: 10 TABLET ORAL at 09:01

## 2025-01-01 ASSESSMENT — PAIN SCALES - GENERAL
PAINLEVEL_OUTOF10: 4
PAINLEVEL_OUTOF10: 0

## 2025-01-01 ASSESSMENT — PAIN DESCRIPTION - DESCRIPTORS: DESCRIPTORS: ACHING;DISCOMFORT

## 2025-01-01 ASSESSMENT — PAIN DESCRIPTION - FREQUENCY: FREQUENCY: INTERMITTENT

## 2025-01-01 ASSESSMENT — PAIN DESCRIPTION - ORIENTATION: ORIENTATION: LEFT;RIGHT

## 2025-01-01 ASSESSMENT — PAIN SCALES - WONG BAKER: WONGBAKER_NUMERICALRESPONSE: HURTS LITTLE MORE

## 2025-01-01 ASSESSMENT — PAIN DESCRIPTION - LOCATION: LOCATION: GENERALIZED

## 2025-01-01 ASSESSMENT — PAIN DESCRIPTION - ONSET: ONSET: ON-GOING

## 2025-01-01 ASSESSMENT — PAIN DESCRIPTION - PAIN TYPE: TYPE: ACUTE PAIN;SURGICAL PAIN

## 2025-01-01 ASSESSMENT — PAIN - FUNCTIONAL ASSESSMENT: PAIN_FUNCTIONAL_ASSESSMENT: PREVENTS OR INTERFERES SOME ACTIVE ACTIVITIES AND ADLS

## 2025-01-01 NOTE — PROCEDURES
INTERPRETATION:  This 25-minute, computer-assisted video EEG recording is mildly abnormal.  It showed mild degree of generalized slowing background activity.  No potentially epileptiform activity was present during the recording.      The EEG findings were consistent with mild degree of generalized non-specific cerebral dysfunction.    CLASSIFICATION:  Dysrhythmia grade 1, generalized.  Sleep - unsuccessful.  EKG channel.    DESCRIPTION:    BACKGROUND:  The awake recording revealed 9 Hz alpha activity over the posterior head region.  There were increased 4 to 7 Hz theta activity into the EEG background.  Given the extensive study, the patient still did not sleep.  The EEG showed normal V waves during drowsiness.  There was no significant change on the EEG background with photic stimulation.  Hyperventilation was omitted due to old age.      INTERICTAL DISCHARGES: None    CLINICAL EVENTS:  None    The EKG channel was unremarkable.

## 2025-01-01 NOTE — PLAN OF CARE
Problem: Pain  Goal: Verbalizes/displays adequate comfort level or baseline comfort level  Outcome: Progressing  Flowsheets (Taken 12/31/2024 230)  Verbalizes/displays adequate comfort level or baseline comfort level:   Encourage patient to monitor pain and request assistance   Assess pain using appropriate pain scale   Administer analgesics based on type and severity of pain and evaluate response   Implement non-pharmacological measures as appropriate and evaluate response

## 2025-01-02 LAB
BACTERIA UR CULT: ABNORMAL
BACTERIA UR CULT: ABNORMAL
ORGANISM: ABNORMAL

## 2025-01-02 PROCEDURE — 97535 SELF CARE MNGMENT TRAINING: CPT

## 2025-01-02 PROCEDURE — 99222 1ST HOSP IP/OBS MODERATE 55: CPT | Performed by: INTERNAL MEDICINE

## 2025-01-02 PROCEDURE — 97130 THER IVNTJ EA ADDL 15 MIN: CPT

## 2025-01-02 PROCEDURE — 6360000002 HC RX W HCPCS: Performed by: PHYSICAL MEDICINE & REHABILITATION

## 2025-01-02 PROCEDURE — 2580000003 HC RX 258: Performed by: PHYSICAL MEDICINE & REHABILITATION

## 2025-01-02 PROCEDURE — 97116 GAIT TRAINING THERAPY: CPT

## 2025-01-02 PROCEDURE — 97129 THER IVNTJ 1ST 15 MIN: CPT

## 2025-01-02 PROCEDURE — 97530 THERAPEUTIC ACTIVITIES: CPT

## 2025-01-02 PROCEDURE — 1280000000 HC REHAB R&B

## 2025-01-02 PROCEDURE — 6370000000 HC RX 637 (ALT 250 FOR IP): Performed by: STUDENT IN AN ORGANIZED HEALTH CARE EDUCATION/TRAINING PROGRAM

## 2025-01-02 PROCEDURE — 6360000002 HC RX W HCPCS: Performed by: STUDENT IN AN ORGANIZED HEALTH CARE EDUCATION/TRAINING PROGRAM

## 2025-01-02 RX ADMIN — METOPROLOL TARTRATE 25 MG: 25 TABLET, FILM COATED ORAL at 08:58

## 2025-01-02 RX ADMIN — FLUTICASONE PROPIONATE 1 SPRAY: 50 SPRAY, METERED NASAL at 19:59

## 2025-01-02 RX ADMIN — FIDAXOMICIN 200 MG: 200 TABLET, FILM COATED ORAL at 19:57

## 2025-01-02 RX ADMIN — Medication 400 MG: at 09:05

## 2025-01-02 RX ADMIN — LISINOPRIL 10 MG: 10 TABLET ORAL at 08:59

## 2025-01-02 RX ADMIN — OXYCODONE HYDROCHLORIDE AND ACETAMINOPHEN 500 MG: 500 TABLET ORAL at 08:58

## 2025-01-02 RX ADMIN — ACETAMINOPHEN 650 MG: 325 TABLET ORAL at 19:54

## 2025-01-02 RX ADMIN — ASPIRIN 81 MG 81 MG: 81 TABLET ORAL at 08:58

## 2025-01-02 RX ADMIN — GABAPENTIN 200 MG: 100 CAPSULE ORAL at 14:42

## 2025-01-02 RX ADMIN — HEPARIN SODIUM 5000 UNITS: 5000 INJECTION INTRAVENOUS; SUBCUTANEOUS at 19:58

## 2025-01-02 RX ADMIN — CETIRIZINE HYDROCHLORIDE 5 MG: 5 TABLET ORAL at 08:58

## 2025-01-02 RX ADMIN — HEPARIN SODIUM 5000 UNITS: 5000 INJECTION INTRAVENOUS; SUBCUTANEOUS at 06:36

## 2025-01-02 RX ADMIN — MEROPENEM 1000 MG: 1 INJECTION INTRAVENOUS at 08:57

## 2025-01-02 RX ADMIN — HEPARIN SODIUM 5000 UNITS: 5000 INJECTION INTRAVENOUS; SUBCUTANEOUS at 14:42

## 2025-01-02 RX ADMIN — METOPROLOL TARTRATE 25 MG: 25 TABLET, FILM COATED ORAL at 19:53

## 2025-01-02 RX ADMIN — FIDAXOMICIN 200 MG: 200 TABLET, FILM COATED ORAL at 08:58

## 2025-01-02 RX ADMIN — ATORVASTATIN CALCIUM 10 MG: 10 TABLET, FILM COATED ORAL at 08:58

## 2025-01-02 RX ADMIN — PANTOPRAZOLE SODIUM 40 MG: 40 TABLET, DELAYED RELEASE ORAL at 06:35

## 2025-01-02 RX ADMIN — DICLOFENAC SODIUM TOPICAL GEL, 1% 4 G: 10 GEL TOPICAL at 14:50

## 2025-01-02 RX ADMIN — OXYCODONE HYDROCHLORIDE AND ACETAMINOPHEN 500 MG: 500 TABLET ORAL at 19:54

## 2025-01-02 RX ADMIN — DICLOFENAC SODIUM TOPICAL GEL, 1% 4 G: 10 GEL TOPICAL at 19:59

## 2025-01-02 RX ADMIN — Medication 5 MG: at 19:54

## 2025-01-02 RX ADMIN — GABAPENTIN 200 MG: 100 CAPSULE ORAL at 19:54

## 2025-01-02 RX ADMIN — ACETAMINOPHEN 650 MG: 325 TABLET ORAL at 14:42

## 2025-01-02 RX ADMIN — LISINOPRIL 10 MG: 10 TABLET ORAL at 19:53

## 2025-01-02 RX ADMIN — Medication 1 TABLET: at 19:53

## 2025-01-02 RX ADMIN — MEROPENEM 1000 MG: 1 INJECTION INTRAVENOUS at 18:05

## 2025-01-02 RX ADMIN — Medication 1 TABLET: at 08:58

## 2025-01-02 RX ADMIN — MEROPENEM 1000 MG: 1 INJECTION INTRAVENOUS at 01:25

## 2025-01-02 RX ADMIN — GABAPENTIN 200 MG: 100 CAPSULE ORAL at 08:58

## 2025-01-02 RX ADMIN — LEVOTHYROXINE SODIUM 50 MCG: 0.05 TABLET ORAL at 06:35

## 2025-01-02 RX ADMIN — DICLOFENAC SODIUM TOPICAL GEL, 1% 4 G: 10 GEL TOPICAL at 09:04

## 2025-01-02 ASSESSMENT — PAIN SCALES - GENERAL
PAINLEVEL_OUTOF10: 0
PAINLEVEL_OUTOF10: 0
PAINLEVEL_OUTOF10: 2
PAINLEVEL_OUTOF10: 0

## 2025-01-02 ASSESSMENT — PAIN DESCRIPTION - LOCATION: LOCATION: HEAD

## 2025-01-02 ASSESSMENT — PAIN - FUNCTIONAL ASSESSMENT: PAIN_FUNCTIONAL_ASSESSMENT: ACTIVITIES ARE NOT PREVENTED

## 2025-01-02 ASSESSMENT — PAIN DESCRIPTION - ORIENTATION: ORIENTATION: MID

## 2025-01-02 ASSESSMENT — PAIN DESCRIPTION - DESCRIPTORS: DESCRIPTORS: ACHING;DISCOMFORT

## 2025-01-02 NOTE — CONSULTS
potential for severe exacerbation of infection/side effects of treatment.  Therapy requires intensive monitoring for antimicrobial agent toxicity         Angie Haines M.D.    Thank you for the opportunity to participate in the care of your patient.    Please do not hesitate to contact me:   [phone] office

## 2025-01-02 NOTE — PROGRESS NOTES
Fabiana Ceballos  1/2/2025  [id number]    Chief Complaint: SDH (subdural hematoma)    Subjective:   No acute events overnight. Today Fabiana is seen with Speech present. She continues to appear fatigued, but is slightly more alert than prior. She notes continued headache, but is unable to quantify it.     Updated daughter bedside.     Personally reviewed labs.     ROS: denies f/c, n/v, cp, sob    Objective:  Patient Vitals for the past 24 hrs:   BP Temp Temp src Pulse Resp SpO2   01/02/25 0858 139/79 98.3 °F (36.8 °C) Oral 93 18 96 %   01/02/25 0738 (!) 141/89 -- -- (!) 101 -- 96 %   01/01/25 2038 -- -- -- -- 18 --   01/01/25 2000 131/79 97.5 °F (36.4 °C) Oral (!) 102 18 96 %     Gen: No distress, fatigued  HEENT: Normocephalic, left craniotomy incision healing well with staples in place  CV: extremities well perfused  Resp: No respiratory distress. On room air  Abd: Soft, nondistended   Ext: No edema.   Neuro: Alert, fatigued, moves all four extremities   Psych: appropriate mood and affect    Wt Readings from Last 3 Encounters:   12/31/24 72.7 kg (160 lb 4.4 oz)   11/15/24 76.2 kg (168 lb)   06/21/24 83.9 kg (185 lb)       Laboratory data:   Lab Results   Component Value Date    WBC 9.2 01/01/2025    HGB 11.9 (L) 01/01/2025    HCT 36.1 01/01/2025    MCV 83.2 01/01/2025     01/01/2025       Lab Results   Component Value Date/Time     01/01/2025 07:41 AM    K 4.1 01/01/2025 07:41 AM     01/01/2025 07:41 AM    CO2 24 01/01/2025 07:41 AM    BUN 17 01/01/2025 07:41 AM    CREATININE 0.6 01/01/2025 07:41 AM    GLUCOSE 107 01/01/2025 07:41 AM    CALCIUM 9.4 01/01/2025 07:41 AM        Therapy progress:  Physical therapy:  Bed Mobility:     Sit>supine:  Assistance Level: Minimal assistance  Skilled Clinical Factors: for BLE, maintaining spinal px  Supine>sit:  Assistance Level: Contact guard assist, Stand by assist  Skilled Clinical Factors: with cues for spinal precuations, HOB elevated  Transfers:  Surface:  craniotomy 12/19  - will determine timing of staple removal - awaiting callback from Neurosurgery  - goal SBP<160  - on lower dose of home gabapentin  - completed 7 day course of Keppra prophylaxis per Neurosurgery  - PT, OT, ST  - increased fatigue and decreased participation 12/30. CT head obtained 12/30. Hold seroquel qhs. UA positive for UTI  - consulted Neurology due to intermittent episodes of severe fatigue. Repeat CT head stable. EEG unremarkable    ESBL UTI  - merrem   - consult ID for assistance in setting of C diff, appreciate assistance    R shoulder pain  B/l knee pain  Started voltaren 4g QID    Hypokalemia  - replace PRN    Hypomagnesemia  - continue daily replacement      Dysphagia  - on modified diet  - ST    C diff  - consulted GI, appreciate assistance. Continue dificid for 14 day course  -GI note reviewed- continue dificid 12/28     CAD  - history of stents   - ok to resume home asa on 12/27  - statin     HTN  - continue metoprolol  - resumed lisinopril  - holding home HCTZ     Hypothyroidism  - levothyoxine      GERD  - pantoprazole     Bowels: adjust medications as needed for regular bowel movements      Bladder: Check PVR x 3.  IMC if PVR > 200ml or if any volume is > 500 ml.      Sleep: melatonin provided prn.      PPX  DVT: heparin   GI: not indicated     Follow up appointments: PCP, Neurosurgery     Therapy progress: pending therapies today  Services:  PT, OT, ST, nursing; 24 hour assistance  DME: [employer]  EDOD: 1/10/24    Interdisciplinary team conference was held today with entire rehab treatment team including PT, OT, SLP (if applicable), Dietician, RN, and SW. Discussion focused on progress toward rehab goals and discharge planning. Making progress. Barriers include level of assistance, availability of assistance, endurance, comorbidities. We as a medical team, and I as the physician , made a plan to work on these barriers to facilitate safe discharge. Plan will be presented

## 2025-01-02 NOTE — PROGRESS NOTES
Physical Therapy  Facility/Department: [facility]  Rehabilitation Physical Therapy Treatment Note    NAME: Fabiana Ceballos  : 1945 (79 y.o.)  MRN: [id number]  CODE STATUS: Full Code    Date of Service: 25       Restrictions:  Restrictions/Precautions: Modified Diet, Fall Risk, Contact Precautions, General Precautions, ROM Restrictions  Required Braces or Orthoses  Spinal: Thoracic Lumbar Sacral Orthotics  Spinal Other: when OOB, may don at EOB  Position Activity Restriction  Spinal Precautions: No Lifting, No Twisting, No Bending  Spinal Precautions Comments: Per pt/family pt with spinal px and to wear TLSO until mid february  Other Position/Activity Restrictions: TLSO, soft and bite sized, recent back surgery 2024, L scalp incision with staples, contact-Cdiff, spinal     SUBJECTIVE  Subjective: found in recliner  Pain: pt did not respond when asked if she had pain       OBJECTIVE  Cognition  Overall Cognitive Status: Exceptions  Arousal/Alertness: Delayed responses to stimuli;Inconsistent responses to stimuli  Following Commands: Follows one step commands with increased time  Attention Span: Attends with cues to redirect;Difficulty attending to directions;Difficulty dividing attention  Memory: Impaired;Decreased short term memory;Decreased recall of recent events;Decreased recall of precautions  Safety Judgement: Decreased awareness of need for assistance;Decreased awareness of need for safety  Problem Solving: Assistance required to correct errors made;Assistance required to identify errors made;Assistance required to implement solutions;Assistance required to generate solutions  Insights: Decreased awareness of deficits  Initiation: Requires cues for some  Sequencing: Requires cues for some  Cognition Comment: Patient lethargic requiring increased cues to remain alert as session progressed. Patient requiring increased time for cognitive processing.  Orientation  Overall Orientation Status:  Impaired  Orientation Level: Oriented to person;Disoriented to place;Disoriented to time;Disoriented to situation    Functional Mobility  Balance  Standing Balance: Minimal assistance  Standing Balance  Activity: pt completed hand washing at sink without UE support and CGA-min a for static/ dyn balance with shoulder width ELIDA. multiple posterior LOB without righting reactions.  Sit to Stand  Assistance Level: Contact guard assist;Minimal assistance  Skilled Clinical Factors: cues for safe hand placement  Stand to Sit  Assistance Level: Contact guard assist;Minimal assistance      Environmental Mobility  Ambulation  Surface: Level surface  Device: Rolling walker  Distance: 20 ft x 2 reps about room  Assistance Level: Contact guard assist;Minimal assistance  Gait Deviations: Slow david;Decreased step length bilateral;Unsteady gait      PT Exercises  Dynamic Standing Balance Exercises: x20 reps fo BUE reaches to targets on IL side (maintain spinal precautions) in various planes with 1 UE support adn CGA-min a with shoulder width ELIDA    Pt in recliner at end of session with call light/needs within reach and alarm donned    Second Session    Pt found in recliner providing am medications. Pt presenting with increased drowsiness this am, limited verbal responses to therapist questions. Pt also often closing eyes, delayed if any reaction to therapy question/ task initiation.     Sit to stand recliner to RW with min a    Gait x 22 ft with rw, min a demonstrating poor RW management, decreased step height/length and heel strike. Pt demos variable gait speeds     Pivot to commode with RW and min a, cues for sequencing/ safety prior to sitting    Pt utilized commode, voided bladder. Requires min a for dyn balance while completing pericare, clothing management with no righting reactions present.    Pivot from commode to w/c with min A    Stand pivot from w/c to bed with bed rail and min a  Sit to supine SBA  Pt in bed at end of

## 2025-01-02 NOTE — PROGRESS NOTES
Comprehensive Nutrition Assessment    Type and Reason for Visit:  Reassess    Nutrition Recommendations/Plan:   Continue soft and bite sized diet - textures and consistencies per SLP   Continue Ensure daily   Encourage PO intakes as tolerated   Assist with meals as needed   Monitor nutrition adequacy, pertinent labs, bowel habits, wt changes, and clinical progress     Malnutrition Assessment:  Malnutrition Status:  At risk for malnutrition (12/26/24 1253)    Context:  Acute Illness     Findings of the 6 clinical characteristics of malnutrition:  Energy Intake:  Mild decrease in energy intake  Weight Loss:  Unable to assess (limited recent hx and unspecified/\"stated\" collection methods)     Body Fat Loss:  Mild body fat loss (question natural aging versus malnutrition) Orbital   Muscle Mass Loss:  Mild muscle mass loss Temples (temporalis) (question natural aging versus malnutrition)  Fluid Accumulation:  No fluid accumulation     Strength:  Not Performed    Nutrition Assessment:    Follow up: Pt nutirtionally improving AEB PO intakes of % this admission. Pt continues on soft and bite sized diet with thin liquids. Ensure ordered daily, will continue to promote PO and protein intakes this admission. Weights relatively stable this admission. Will continue to monitor further nutritional needs.    Nutrition Related Findings:    BM on 1/1. Active BS. +C. diff. Labs reviewed. Wound Type: Surgical Incision       Current Nutrition Intake & Therapies:    Average Meal Intake: 51-75%, %  Average Supplements Intake: 51-75%  ADULT DIET; Dysphagia - Soft and Bite Sized  ADULT ORAL NUTRITION SUPPLEMENT; Dinner; Standard High Calorie/High Protein Oral Supplement    Anthropometric Measures:  Height: 162.6 cm (5' 4.02\")  Ideal Body Weight (IBW): 120 lbs (55 kg)    Admission Body Weight: 74.4 kg (164 lb 0.4 oz) (stated)  Current Body Weight: 72.6 kg (160 lb), 136.7 % IBW. Weight Source: Not specified  Current BMI

## 2025-01-02 NOTE — PROGRESS NOTES
[facility]: ACUTE REHAB UNIT  SPEECH-LANGUAGE PATHOLOGY      [x] Daily  [] Weekly Care Conference Note  [] Discharge    Patient:Fabiana Ceballos      :1945  MRN:[id number]  Rehab Dx/Hx: SDH (subdural hematoma) [S06.5XAA]    Precautions: falls, contact   Home situation: lives alone. Indep with med management, finances, and all household tasks.  ST Dx: [] Aphasia  [] Dysarthria  [] Apraxia   [x] Oropharyngeal dysphagia [x] Cognitive Impairment  [] Other:   Date of Admit: 2024  Room #: 0155/0155-01    Current functional status (updated daily):         Pt being seen for : [] Speech/Language Treatment  [x] Dysphagia Treatment [x] Cognitive Treatment  [] Other:  Communication: [x]WFL  [] Aphasia  [] Dysarthria  [] Apraxia  [] Pragmatic Impairment [] Non-verbal  [] Hearing Loss  [] Other:   Cognition: [] WFL  [] Mild  [x] Moderate  [x] Severe [] Unable to Assess  [] Other:  Memory: [] WFL  [] Mild  [x] Moderate  [x] Severe [] Unable to Assess  [] Other:  Behavior: [] Alert  [x] Cooperative  [x]  Pleasant  [] Confused  [] Agitated  [] Uncooperative  [] Distractible [] Motivated  [] Self-Limiting [] Anxious  [] Other:  Endurance:  [] Adequate for participation in SLP sessions  [x] Reduced overall  [x] Lethargic  [] Other:  Safety: [] No concerns at this time  [x] Reduced insight into deficits  [x]  Reduced safety awareness [] Not following call light procedures  [] Unable to Assess  [] Other:    Current Diet Order:ADULT DIET; Dysphagia - Soft and Bite Sized  ADULT ORAL NUTRITION SUPPLEMENT; Dinner; Standard High Calorie/High Protein Oral Supplement  Swallowing Precautions: upright for all intake, stay upright for at least 30 mins after intake, small bites/sips, close supervision, oral care 2-3x/day to reduce adverse affects in the event of aspiration, increase physical mobility as able, alternate bites/sips, slow rate of intake, general GERD precautions, general aspiration precautions, and hold PO and contact SLP if s/s

## 2025-01-02 NOTE — PLAN OF CARE
Problem: Pain  Goal: Verbalizes/displays adequate comfort level or baseline comfort level  Note: Pain assessment completed. Nonpharmacological methods offered prior to and during times of pain. Medicated per orders as necessary.       Problem: Safety - Adult  Goal: Free from fall injury  Outcome: Progressing  Note: Pt. Free from falls or self injury at this time. Falls risk protocol maintained. Call light within reach.

## 2025-01-02 NOTE — PROGRESS NOTES
Occupational Therapy  Facility/Department: [facility]  Rehabilitation Occupational Therapy Daily Treatment Note    Date: 25  Patient Name: Fabiana Ceballos       Room: 0155/0155-01  MRN: [id number]  Account: [id number]   : 1945  (79 y.o.) Gender: female                    Past Medical History:  has a past medical history of Anesthesia, Arthritis, Hyperlipidemia, Hypertension, Lumbar herniated disc, Lumbar spondylosis, Lumbar stenosis, Prediabetes, Radiculopathy of lumbar region, Scoliosis, and Thyroid disease.  Past Surgical History:   has a past surgical history that includes Hysterectomy; Cholecystectomy; bladder repair; Colonoscopy; lumbar discectomy (2011); Total knee arthroplasty (Right, 2023); Coronary angioplasty; and Total knee arthroplasty (Right, 2023).    Restrictions  Restrictions/Precautions: Modified Diet, Fall Risk, Contact Precautions, General Precautions, ROM Restrictions  Spinal Precautions Comments: Per pt/family pt with spinal px and to wear TLSO until mid february  Other Position/Activity Restrictions: TLSO, soft and bite sized, recent back surgery 2024, L scalp incision with staples, contact-Cdiff, spinal px  Required Braces or Orthoses  Spinal: Thoracic Lumbar Sacral Orthotics  Spinal Other: when OOB, may don at EOB  Required Braces or Orthoses?: Yes    Subjective  Subjective: Pt upright in bed with daughter present. Agreeable to OT tx. Vitals WFL, denies pain at rest  Restrictions/Precautions: Modified Diet;Fall Risk;Contact Precautions;General Precautions;ROM Restrictions             Objective     Cognition  Overall Cognitive Status: Exceptions  Arousal/Alertness: Delayed responses to stimuli;Inconsistent responses to stimuli  Following Commands: Follows one step commands with increased time  Attention Span: Attends with cues to redirect;Difficulty attending to directions;Difficulty dividing attention  Memory: Impaired;Decreased short term memory;Decreased

## 2025-01-03 LAB
ANION GAP SERPL CALCULATED.3IONS-SCNC: 8 MMOL/L (ref 3–16)
BASOPHILS # BLD: 0 K/UL (ref 0–0.2)
BASOPHILS NFR BLD: 0.4 %
BUN SERPL-MCNC: 18 MG/DL (ref 7–20)
CALCIUM SERPL-MCNC: 9.2 MG/DL (ref 8.3–10.6)
CHLORIDE SERPL-SCNC: 106 MMOL/L (ref 99–110)
CO2 SERPL-SCNC: 26 MMOL/L (ref 21–32)
CREAT SERPL-MCNC: 0.6 MG/DL (ref 0.6–1.2)
DEPRECATED RDW RBC AUTO: 18.9 % (ref 12.4–15.4)
EOSINOPHIL # BLD: 0.2 K/UL (ref 0–0.6)
EOSINOPHIL NFR BLD: 2.3 %
GFR SERPLBLD CREATININE-BSD FMLA CKD-EPI: 90 ML/MIN/{1.73_M2}
GLUCOSE SERPL-MCNC: 98 MG/DL (ref 70–99)
HCT VFR BLD AUTO: 35.3 % (ref 36–48)
HGB BLD-MCNC: 11.4 G/DL (ref 12–16)
LYMPHOCYTES # BLD: 2.3 K/UL (ref 1–5.1)
LYMPHOCYTES NFR BLD: 28 %
MCH RBC QN AUTO: 27.1 PG (ref 26–34)
MCHC RBC AUTO-ENTMCNC: 32.5 G/DL (ref 31–36)
MCV RBC AUTO: 83.4 FL (ref 80–100)
MONOCYTES # BLD: 0.5 K/UL (ref 0–1.3)
MONOCYTES NFR BLD: 6.7 %
NEUTROPHILS # BLD: 5.2 K/UL (ref 1.7–7.7)
NEUTROPHILS NFR BLD: 62.6 %
PLATELET # BLD AUTO: 313 K/UL (ref 135–450)
PMV BLD AUTO: 8.2 FL (ref 5–10.5)
POTASSIUM SERPL-SCNC: 4.1 MMOL/L (ref 3.5–5.1)
RBC # BLD AUTO: 4.23 M/UL (ref 4–5.2)
SODIUM SERPL-SCNC: 140 MMOL/L (ref 136–145)
WBC # BLD AUTO: 8.2 K/UL (ref 4–11)

## 2025-01-03 PROCEDURE — 97530 THERAPEUTIC ACTIVITIES: CPT

## 2025-01-03 PROCEDURE — 1280000000 HC REHAB R&B

## 2025-01-03 PROCEDURE — 6360000002 HC RX W HCPCS: Performed by: STUDENT IN AN ORGANIZED HEALTH CARE EDUCATION/TRAINING PROGRAM

## 2025-01-03 PROCEDURE — 97130 THER IVNTJ EA ADDL 15 MIN: CPT

## 2025-01-03 PROCEDURE — 2580000003 HC RX 258: Performed by: PHYSICAL MEDICINE & REHABILITATION

## 2025-01-03 PROCEDURE — 99232 SBSQ HOSP IP/OBS MODERATE 35: CPT | Performed by: INTERNAL MEDICINE

## 2025-01-03 PROCEDURE — 6360000002 HC RX W HCPCS: Performed by: PHYSICAL MEDICINE & REHABILITATION

## 2025-01-03 PROCEDURE — 85025 COMPLETE CBC W/AUTO DIFF WBC: CPT

## 2025-01-03 PROCEDURE — 97110 THERAPEUTIC EXERCISES: CPT

## 2025-01-03 PROCEDURE — 97129 THER IVNTJ 1ST 15 MIN: CPT

## 2025-01-03 PROCEDURE — 97535 SELF CARE MNGMENT TRAINING: CPT

## 2025-01-03 PROCEDURE — 6370000000 HC RX 637 (ALT 250 FOR IP): Performed by: STUDENT IN AN ORGANIZED HEALTH CARE EDUCATION/TRAINING PROGRAM

## 2025-01-03 PROCEDURE — 80048 BASIC METABOLIC PNL TOTAL CA: CPT

## 2025-01-03 RX ADMIN — DICLOFENAC SODIUM TOPICAL GEL, 1% 4 G: 10 GEL TOPICAL at 17:54

## 2025-01-03 RX ADMIN — METOPROLOL TARTRATE 25 MG: 25 TABLET, FILM COATED ORAL at 21:45

## 2025-01-03 RX ADMIN — Medication 1 TABLET: at 08:29

## 2025-01-03 RX ADMIN — MEROPENEM 1000 MG: 1 INJECTION INTRAVENOUS at 17:43

## 2025-01-03 RX ADMIN — FIDAXOMICIN 200 MG: 200 TABLET, FILM COATED ORAL at 08:35

## 2025-01-03 RX ADMIN — HEPARIN SODIUM 5000 UNITS: 5000 INJECTION INTRAVENOUS; SUBCUTANEOUS at 05:09

## 2025-01-03 RX ADMIN — GABAPENTIN 200 MG: 100 CAPSULE ORAL at 08:28

## 2025-01-03 RX ADMIN — GABAPENTIN 200 MG: 100 CAPSULE ORAL at 14:46

## 2025-01-03 RX ADMIN — Medication 400 MG: at 08:29

## 2025-01-03 RX ADMIN — GABAPENTIN 200 MG: 100 CAPSULE ORAL at 21:45

## 2025-01-03 RX ADMIN — PANTOPRAZOLE SODIUM 40 MG: 40 TABLET, DELAYED RELEASE ORAL at 05:09

## 2025-01-03 RX ADMIN — HEPARIN SODIUM 5000 UNITS: 5000 INJECTION INTRAVENOUS; SUBCUTANEOUS at 14:46

## 2025-01-03 RX ADMIN — METOPROLOL TARTRATE 25 MG: 25 TABLET, FILM COATED ORAL at 08:28

## 2025-01-03 RX ADMIN — HEPARIN SODIUM 5000 UNITS: 5000 INJECTION INTRAVENOUS; SUBCUTANEOUS at 21:45

## 2025-01-03 RX ADMIN — CETIRIZINE HYDROCHLORIDE 5 MG: 5 TABLET ORAL at 08:28

## 2025-01-03 RX ADMIN — OXYCODONE HYDROCHLORIDE AND ACETAMINOPHEN 500 MG: 500 TABLET ORAL at 21:45

## 2025-01-03 RX ADMIN — OXYCODONE HYDROCHLORIDE AND ACETAMINOPHEN 500 MG: 500 TABLET ORAL at 08:28

## 2025-01-03 RX ADMIN — MEROPENEM 1000 MG: 1 INJECTION INTRAVENOUS at 01:07

## 2025-01-03 RX ADMIN — Medication 1 TABLET: at 21:45

## 2025-01-03 RX ADMIN — FIDAXOMICIN 200 MG: 200 TABLET, FILM COATED ORAL at 21:46

## 2025-01-03 RX ADMIN — LEVOTHYROXINE SODIUM 50 MCG: 0.05 TABLET ORAL at 05:09

## 2025-01-03 RX ADMIN — Medication 5 MG: at 21:45

## 2025-01-03 RX ADMIN — ATORVASTATIN CALCIUM 10 MG: 10 TABLET, FILM COATED ORAL at 08:29

## 2025-01-03 RX ADMIN — LISINOPRIL 10 MG: 10 TABLET ORAL at 21:45

## 2025-01-03 RX ADMIN — LISINOPRIL 10 MG: 10 TABLET ORAL at 08:28

## 2025-01-03 RX ADMIN — DICLOFENAC SODIUM TOPICAL GEL, 1% 4 G: 10 GEL TOPICAL at 08:33

## 2025-01-03 RX ADMIN — ASPIRIN 81 MG 81 MG: 81 TABLET ORAL at 08:28

## 2025-01-03 RX ADMIN — MEROPENEM 1000 MG: 1 INJECTION INTRAVENOUS at 08:26

## 2025-01-03 ASSESSMENT — PAIN SCALES - GENERAL: PAINLEVEL_OUTOF10: 0

## 2025-01-03 NOTE — PROGRESS NOTES
situation    Functional Mobility  Supine to Sit  Assistance Level: Supervision  Skilled Clinical Factors: with cues for spinal precuations, HOB elevated  Scooting  Assistance Level: Stand by assist  Balance  Sitting Balance: Stand by assistance  Sit to Stand  Assistance Level: Contact guard assist  Stand to Sit  Assistance Level: Contact guard assist  Bed To/From Chair  Assistance Level: Contact guard assist  Skilled Clinical Factors: from EOB to wc with no AD      PT Exercises  Exercise Treatment: pt completed 20 reps of BLE seated ankle pumps, blair [place]    Second Session    Pt found sleeping in bed, difficult to wake. Denies pain when awake    Supine to sit with max a of 2    Stand pivot EOB to wc with no AD and mod A    Wc mobility x 20 ft + 15 ft with BUEs and CGA-min A    Pt completed 4 min on SCIFIT level 1 with BUE and BLE for increased LE strength/ROM    Pt in w/c at end of session with call light/needs and alarm donned. Family present  ASSESSMENT/PROGRESS TOWARDS GOALS  Vital Signs  Pulse: (!) 103  Heart Rate Source: Monitor  BP: (!) 148/69  BP Location: Right upper arm  MAP (Calculated): 95  SpO2: 98 %  O2 Device: None (Room air)    Assessment  Assessment: pt found in bed, alert and more talkative this am compared to earlier in week. SBA for bed mobility, CGA for transfers., ther ex completed without compliants EOB. continue to rec home with 24/7 and HHPT. DME: manual w/c and RW (pt states she owns)  Activity Tolerance: Patient limited by fatigue;Patient limited by endurance;Treatment limited secondary to decreased cognition  Discharge Recommendations: 24 hour supervision or assist;Home with Home health PT;Subacute/Skilled Nursing Facility  PT Equipment Recommendations  Equipment Needed: Yes  Walker: Rolling  Wheelchair: Standard    Goals  Patient Goals   Patient Goals : \"get out of here\"  Short Term Goals  Time Frame for Short Term Goals: 1 week 12/30/2024  Short Term Goal 1: Pt will complete bed  mobility with CGA- GOAL MET  Short Term Goal 2: Pt will complete functional t/f with LRAD with SBA- GOA LNOT MET< CGA  Short Term Goal 3: Pt will ambulate >50' with LRAD wtih SBA without LOB- GOAL NOT MET, CGA  Short Term Goal 4: Pt will ascend/descend curb step with LRAD with CGA- GOAL NOT MET< NT 2* fatigue  Short Term Goal 5: Pt will tolerate formal balance assessment and appropriate goal to be set - 12/28 Not met d/t cognition, arousal levels  Long Term Goals  Time Frame for Long Term Goals : 14 days 1/06/2025  Long Term Goal 1: Pt will complete bed mobility with Bekah  Long Term Goal 2: Pt will complete STS with LRAD with Bekah  Long Term Goal 3: Pt will complete functional t/f with LRAD with Sup  Long Term Goal 4: Pt will ambulate >150' with LRAD with SUP without LOB  Long Term Goal 5: Pt will ascend/descend curb step with LRAD with SUP    PLAN OF CARE/SAFETY  Physical Therapy Plan  General Plan: 5-7 times per week  Current Treatment Recommendations: Strengthening;ROM;Balance training;Functional mobility training;Transfer training;Endurance training;Wheelchair mobility training;Gait training;Stair training;Neuromuscular re-education;Manual;Cognitive reorientation;Home exercise program;Safety education & training;Patient/Caregiver education & training;Equipment evaluation, education, & procurement;Modalities;Positioning;Therapeutic activities  Safety Devices  Type of Devices: Patient at risk for falls;All fall risk precautions in place;Left in bed;Bed alarm in place;Call light within reach;Nurse notified;Gait belt    EDUCATION  Education  Education Given To: Patient  Education Provided: Role of Therapy;Plan of Care;Precautions;Safety;Transfer Training;Fall Prevention Strategies;Mobility Training;Cognition  Education Provided Comments: Role of PT, safe progression of mobility, use of RW, TLSO and spinal px.  Education Method: Verbal  Barriers to Learning: Cognition;Hearing  Education Outcome: Continued education

## 2025-01-03 NOTE — PROGRESS NOTES
remain alert.       Goal 2: Pt will complete problem solving and thought organization tasks with 80% acc given min cues     Pt with significantly slow thought processing, requiring increased time and frequent repetition of stimuli. Many tasks attempted. Limited tasks completed.         Goal met 24.       New goal added 24:    Goal 4: Pt will complete executive function tasks (e.g.meds, time, money, etc) with 80% acc given min cues      Simple Math Problems   - \"what is 5 + 3?\"  - Pt completed minimal questions with 30% accuracy and MAX cues           Other areas targeted:   Orientation:  - Month: N/A w/ max cues   - Year: indep  - JOSE / Date: N/A w/ max cues   - Place: knows she is not at home unable to recall initially; recalls in hospital later   - Self: Pt able to give last name indep ; pt unable to give first name despite SLP calling pt by first name throughout session   - : gave month and day, unable to give year w/ max cues     Safety / Functional Transfers:  Observed PCA and RN transfer pt from bathroom to wheelchair and from wheelchair to bed. Pt required MAX cues to following simple commands for safe transfer and inconsistently followed directions.       Education:   SLP edu pt re: rationale of participating, orientation     Safety Devices: [x] Call light within reach  [] Chair alarm activated  [x] Bed alarm activated  [] Other:  [] Call light within reach  [] Chair alarm activated  [] Bed alarm activated  [] Other:    Assessment:   Pt seen completing toileting with pCA upon arrival and pt inconsistently following directions for safe transfer. Pt continues to be lethargic during session despite verbal / tactile cues, impacting overall participation and progress. Pt continues to have significantly slow thought processing, recommend 24 hr supervision at d/c. Pt required MAX cues for all attempts to complete tasks and minimal participation in simple tasks observed. Pt appeared to benefit from  conversations / recall of family and daily routine. SLP to follow.     Plan: Continue as per plan of care.           Barriers toward progress: Confusion, Cognitive deficit, Limited safety awareness, Limited participation, Limited insight into deficits, and Unrealistic expectations  Discharge recommendations:  [] Home independently  [] Home with assistance [x]  24 hour supervision  [] ECF [] Other:  Continued Tx Upon Discharge: ? [x] Yes [] No [] TBD based on progress while on ARU [] Vital Stim indicated [] Other:   Estimated discharge date: 01/10/25    Interventions used this date:  [] Speech/Language Treatment  [] Instruction in HEP [] Group [] Dysphagia Treatment [x] Cognitive Treatment   [] Other:      Total Time Breakdown / Charges    Time in Time out Total Time / units   Cognitive Tx 1230 1330 60 min / 4 units   Speech Tx -- -- --   Dysphagia Tx          Electronically Signed by       Trisha Bruno MA CF-SLP   Speech Language Pathologist

## 2025-01-03 NOTE — PROGRESS NOTES
Infectious Disease Follow up Notes    CC : ESBL UTI      Antibiotics:  Meropenem 1g q8, s 1/1     Admit Date:   12/24/2024  Hospital Day: 11    Subjective:   She remains AF   No family in the room  No acute changes per RN     Objective:     Patient Vitals for the past 8 hrs:   BP Temp Temp src Pulse Resp SpO2   01/03/25 1745 136/61 98.3 °F (36.8 °C) Oral 85 18 98 %       EXAM:  General:  more alert, verbal  NAD   NCAT, cranial wound well approximated  PERRL   No rash exposed skin  Abd soft, flat, NT           LINE:   PIV in place        Scheduled Meds:   meropenem  1,000 mg IntraVENous Q8H    ascorbic acid  500 mg Oral BID    diclofenac sodium  4 g Topical 4x daily    magnesium oxide  400 mg Oral Daily    Fidaxomicin  200 mg Oral BID    lisinopril  10 mg Oral BID    metoprolol tartrate  25 mg Oral BID    multivitamin  1 tablet Oral Daily    pantoprazole  40 mg Oral QAM AC    [Held by provider] QUEtiapine  25 mg Oral Nightly    heparin (porcine)  5,000 Units SubCUTAneous 3 times per day    aspirin  81 mg Oral Daily    gabapentin  200 mg Oral Q6H    atorvastatin  10 mg Oral Daily    fluticasone  1 spray Each Nostril Nightly    levothyroxine  50 mcg Oral Daily    calcium carb-cholecalciferol  1 tablet Oral BID    cetirizine  5 mg Oral Daily    lidocaine  1 patch TransDERmal Daily         Data Review:    Lab Results   Component Value Date    WBC 8.2 01/03/2025    HGB 11.4 (L) 01/03/2025    HCT 35.3 (L) 01/03/2025    MCV 83.4 01/03/2025     01/03/2025     Lab Results   Component Value Date    CREATININE 0.6 01/03/2025    BUN 18 01/03/2025     01/03/2025    K 4.1 01/03/2025     01/03/2025    CO2 26 01/03/2025       Hepatic Function Panel: No results found for: \"ALKPHOS\", \"ALT\", \"AST\", \"BILITOT\", \"BILIDIR\", \"IBILI\", \"LABALBU\"    Cultures:   12/25C diff EIA+  12/30UC >100k Escherichia coli ESBL   Antibiotic Interpretation

## 2025-01-03 NOTE — PROGRESS NOTES
Per order, eighteen staples removed from L side of head. Pt tolerated well. Vital signs stable. Pt voices no needs at this time.

## 2025-01-03 NOTE — PROGRESS NOTES
Fabiana Ceballos  1/3/2025  [id number]    Chief Complaint: SDH (subdural hematoma)    Subjective:   No acute events overnight. Today Fabiana is seen in her room. She appears much more alert today. She denies acute complaints at this time.     Personally reviewed labs.     ROS: denies f/c, n/v, cp, sob    Objective:  Patient Vitals for the past 24 hrs:   BP Temp Temp src Pulse Resp SpO2   01/03/25 1008 (!) 148/69 -- -- (!) 103 -- 98 %   01/03/25 0828 121/67 97.7 °F (36.5 °C) Oral 88 18 95 %   01/02/25 1945 138/79 97.8 °F (36.6 °C) Oral 99 18 96 %     Gen: No distress, alert  HEENT: Normocephalic, left craniotomy incision healing well with staples in place  CV: extremities well perfused  Resp: No respiratory distress. On room air  Abd: Soft, nondistended   Ext: No edema.   Neuro: Alert, appropriate  Psych: appropriate mood and affect    Wt Readings from Last 3 Encounters:   12/31/24 72.7 kg (160 lb 4.4 oz)   11/15/24 76.2 kg (168 lb)   06/21/24 83.9 kg (185 lb)       Laboratory data:   Lab Results   Component Value Date    WBC 8.2 01/03/2025    HGB 11.4 (L) 01/03/2025    HCT 35.3 (L) 01/03/2025    MCV 83.4 01/03/2025     01/03/2025       Lab Results   Component Value Date/Time     01/03/2025 05:05 AM    K 4.1 01/03/2025 05:05 AM     01/03/2025 05:05 AM    CO2 26 01/03/2025 05:05 AM    BUN 18 01/03/2025 05:05 AM    CREATININE 0.6 01/03/2025 05:05 AM    GLUCOSE 98 01/03/2025 05:05 AM    CALCIUM 9.2 01/03/2025 05:05 AM        Therapy progress:  Physical therapy:  Bed Mobility:     Sit>supine:  Assistance Level: Minimal assistance  Skilled Clinical Factors: for BLE, maintaining spinal px  Supine>sit:  Assistance Level: Supervision  Skilled Clinical Factors: with cues for spinal precuations, HOB elevated  Transfers:  Surface: From bed, To chair with arms  Additional Factors: Hand placement cues, Increased time to complete, Verbal cues  Device: Walker (RW)  Sit>stand:  Assistance Level: Contact guard

## 2025-01-03 NOTE — PROGRESS NOTES
Occupational Therapy  Facility/Department: [facility]  Rehabilitation Occupational Therapy Daily Treatment Note    Date: 1/3/25  Patient Name: Fabiana Ceballos       Room: 0155/0155-01  MRN: [id number]  Account: [id number]   : 1945  (79 y.o.) Gender: female                    Past Medical History:  has a past medical history of Anesthesia, Arthritis, Hyperlipidemia, Hypertension, Lumbar herniated disc, Lumbar spondylosis, Lumbar stenosis, Prediabetes, Radiculopathy of lumbar region, Scoliosis, and Thyroid disease.  Past Surgical History:   has a past surgical history that includes Hysterectomy; Cholecystectomy; bladder repair; Colonoscopy; lumbar discectomy (2011); Total knee arthroplasty (Right, 2023); Coronary angioplasty; and Total knee arthroplasty (Right, 2023).    Restrictions  Restrictions/Precautions: Modified Diet, Fall Risk, Contact Precautions, General Precautions, ROM Restrictions  Spinal Precautions Comments: Per pt/family pt with spinal px and to wear TLSO until mid february  Other Position/Activity Restrictions: TLSO, soft and bite sized, recent back surgery 2024, L scalp incision with staples, contact-Cdiff, spinal px  Required Braces or Orthoses  Spinal: Thoracic Lumbar Sacral Orthotics  Spinal Other: when OOB, may don at EOB  Required Braces or Orthoses?: Yes    Subjective  Subjective: Pt seated in wc, agreeable to OT tx. /69, , SPO2 98%. Denies pain at rest  Restrictions/Precautions: Modified Diet;Fall Risk;Contact Precautions;General Precautions;ROM Restrictions             Objective     Cognition  Overall Cognitive Status: Exceptions  Arousal/Alertness: Delayed responses to stimuli;Inconsistent responses to stimuli  Following Commands: Follows one step commands with increased time  Attention Span: Attends with cues to redirect;Difficulty attending to directions;Difficulty dividing attention  Memory: Impaired;Decreased short term memory;Decreased recall of  procurement;Self-Care / ADL;Home management training;Coordination training;Gait training;Cognitive reorientation;Pain management;Positioning;Cognitive/Perceptual training    Goals  Patient Goals   Patient goals : \"go home\"  Short Term Goals  Time Frame for Short Term Goals: 1/1/25- ongoing 1/03  Short Term Goal 1: Pt will perform toilet transfer with min A. GOAL MET 12/31/24 Pt performed toilet transfer with min A.  Short Term Goal 2: Pt will perform toileting task with mod A. GOAL MET 12/31/24 Pt performed toileting task with min A.  Short Term Goal 3: Pt will perform LB dressing with mod A  Short Term Goal 4: Pt will perform LB bathing with CGA and utilizing AE PRN  Short Term Goal 5: Pt will perform 5 x 20 reps BUE ex to incr strength and activity tolerance for ADLs and fxl transfers  Long Term Goals  Time Frame for Long Term Goals : 1/06/25- extend to 1/10 d/t inc LOS  Long Term Goal 1: Pt will perform toilet transfer mod I  Long Term Goal 2: Pt will perform TB dressing with mod I  Long Term Goal 3: Pt will perform TB bathing with  mod I  Long Term Goal 4: Pt will perform simple IADL task with mod I                   Therapy Time   Individual Concurrent Group Co-treatment   Time In 1030         Time Out 1130         Minutes 60         Timed Code Treatment Minutes: 60 Minutes       Torrie Taylor OT

## 2025-01-04 PROCEDURE — 1280000000 HC REHAB R&B

## 2025-01-04 PROCEDURE — 97530 THERAPEUTIC ACTIVITIES: CPT

## 2025-01-04 PROCEDURE — 6370000000 HC RX 637 (ALT 250 FOR IP): Performed by: STUDENT IN AN ORGANIZED HEALTH CARE EDUCATION/TRAINING PROGRAM

## 2025-01-04 PROCEDURE — 97110 THERAPEUTIC EXERCISES: CPT

## 2025-01-04 PROCEDURE — 2580000003 HC RX 258: Performed by: PHYSICAL MEDICINE & REHABILITATION

## 2025-01-04 PROCEDURE — 6360000002 HC RX W HCPCS: Performed by: STUDENT IN AN ORGANIZED HEALTH CARE EDUCATION/TRAINING PROGRAM

## 2025-01-04 PROCEDURE — 6360000002 HC RX W HCPCS: Performed by: PHYSICAL MEDICINE & REHABILITATION

## 2025-01-04 PROCEDURE — 97116 GAIT TRAINING THERAPY: CPT

## 2025-01-04 PROCEDURE — 97535 SELF CARE MNGMENT TRAINING: CPT

## 2025-01-04 PROCEDURE — 97129 THER IVNTJ 1ST 15 MIN: CPT

## 2025-01-04 PROCEDURE — 97130 THER IVNTJ EA ADDL 15 MIN: CPT

## 2025-01-04 RX ORDER — LISINOPRIL 20 MG/1
20 TABLET ORAL 2 TIMES DAILY
Status: DISPENSED | OUTPATIENT
Start: 2025-01-04

## 2025-01-04 RX ADMIN — GABAPENTIN 200 MG: 100 CAPSULE ORAL at 14:34

## 2025-01-04 RX ADMIN — HEPARIN SODIUM 5000 UNITS: 5000 INJECTION INTRAVENOUS; SUBCUTANEOUS at 14:33

## 2025-01-04 RX ADMIN — Medication 1 TABLET: at 20:54

## 2025-01-04 RX ADMIN — MEROPENEM 1000 MG: 1 INJECTION INTRAVENOUS at 08:40

## 2025-01-04 RX ADMIN — PANTOPRAZOLE SODIUM 40 MG: 40 TABLET, DELAYED RELEASE ORAL at 05:46

## 2025-01-04 RX ADMIN — METOPROLOL TARTRATE 25 MG: 25 TABLET, FILM COATED ORAL at 08:31

## 2025-01-04 RX ADMIN — LISINOPRIL 20 MG: 20 TABLET ORAL at 20:54

## 2025-01-04 RX ADMIN — CETIRIZINE HYDROCHLORIDE 5 MG: 5 TABLET ORAL at 08:31

## 2025-01-04 RX ADMIN — OXYCODONE HYDROCHLORIDE AND ACETAMINOPHEN 500 MG: 500 TABLET ORAL at 20:54

## 2025-01-04 RX ADMIN — Medication 400 MG: at 08:31

## 2025-01-04 RX ADMIN — GABAPENTIN 200 MG: 100 CAPSULE ORAL at 20:54

## 2025-01-04 RX ADMIN — LISINOPRIL 10 MG: 10 TABLET ORAL at 08:31

## 2025-01-04 RX ADMIN — OXYCODONE HYDROCHLORIDE AND ACETAMINOPHEN 500 MG: 500 TABLET ORAL at 08:31

## 2025-01-04 RX ADMIN — OXYCODONE 5 MG: 5 TABLET ORAL at 20:55

## 2025-01-04 RX ADMIN — ATORVASTATIN CALCIUM 10 MG: 10 TABLET, FILM COATED ORAL at 08:31

## 2025-01-04 RX ADMIN — MEROPENEM 1000 MG: 1 INJECTION INTRAVENOUS at 17:47

## 2025-01-04 RX ADMIN — ASPIRIN 81 MG 81 MG: 81 TABLET ORAL at 08:31

## 2025-01-04 RX ADMIN — GABAPENTIN 200 MG: 100 CAPSULE ORAL at 02:47

## 2025-01-04 RX ADMIN — FIDAXOMICIN 200 MG: 200 TABLET, FILM COATED ORAL at 20:58

## 2025-01-04 RX ADMIN — HEPARIN SODIUM 5000 UNITS: 5000 INJECTION INTRAVENOUS; SUBCUTANEOUS at 20:54

## 2025-01-04 RX ADMIN — Medication 1 TABLET: at 08:31

## 2025-01-04 RX ADMIN — FLUTICASONE PROPIONATE 1 SPRAY: 50 SPRAY, METERED NASAL at 20:58

## 2025-01-04 RX ADMIN — FIDAXOMICIN 200 MG: 200 TABLET, FILM COATED ORAL at 08:30

## 2025-01-04 RX ADMIN — Medication 5 MG: at 20:54

## 2025-01-04 RX ADMIN — GABAPENTIN 200 MG: 100 CAPSULE ORAL at 08:31

## 2025-01-04 RX ADMIN — METOPROLOL TARTRATE 25 MG: 25 TABLET, FILM COATED ORAL at 20:55

## 2025-01-04 RX ADMIN — MEROPENEM 1000 MG: 1 INJECTION INTRAVENOUS at 01:26

## 2025-01-04 RX ADMIN — LEVOTHYROXINE SODIUM 50 MCG: 0.05 TABLET ORAL at 05:46

## 2025-01-04 RX ADMIN — HEPARIN SODIUM 5000 UNITS: 5000 INJECTION INTRAVENOUS; SUBCUTANEOUS at 05:46

## 2025-01-04 ASSESSMENT — PAIN DESCRIPTION - ONSET: ONSET: GRADUAL

## 2025-01-04 ASSESSMENT — PAIN DESCRIPTION - LOCATION: LOCATION: HEAD

## 2025-01-04 ASSESSMENT — PAIN SCALES - WONG BAKER
WONGBAKER_NUMERICALRESPONSE: NO HURT
WONGBAKER_NUMERICALRESPONSE: NO HURT

## 2025-01-04 ASSESSMENT — PAIN - FUNCTIONAL ASSESSMENT: PAIN_FUNCTIONAL_ASSESSMENT: ACTIVITIES ARE NOT PREVENTED

## 2025-01-04 ASSESSMENT — PAIN SCALES - GENERAL
PAINLEVEL_OUTOF10: 0
PAINLEVEL_OUTOF10: 7
PAINLEVEL_OUTOF10: 0

## 2025-01-04 ASSESSMENT — PAIN DESCRIPTION - FREQUENCY: FREQUENCY: INTERMITTENT

## 2025-01-04 ASSESSMENT — PAIN DESCRIPTION - PAIN TYPE: TYPE: ACUTE PAIN

## 2025-01-04 ASSESSMENT — PAIN DESCRIPTION - ORIENTATION: ORIENTATION: LEFT

## 2025-01-04 ASSESSMENT — PAIN DESCRIPTION - DESCRIPTORS: DESCRIPTORS: ACHING

## 2025-01-04 NOTE — PROGRESS NOTES
like herself today. Recommend continued skilled PT services to maximize function needed for safe discharge home with family.  Activity Tolerance: Patient limited by endurance;Treatment limited secondary to decreased cognition;Patient tolerated treatment well  Discharge Recommendations: 24 hour supervision or assist;Home with Home health PT;Subacute/Skilled Nursing Facility  PT Equipment Recommendations  Equipment Needed: Yes  Mobility Devices: Walker  Walker: Rolling  Wheelchair: Standard  Other: Recommend wheelchair due to fluctuations in activity tolerance/arousal levels, elevated BP    Goals  Patient Goals   Patient Goals : \"get out of here\"  Short Term Goals  Time Frame for Short Term Goals: 1 week 12/30/2024  Short Term Goal 1: Pt will complete bed mobility with CGA- GOAL MET  Short Term Goal 2: Pt will complete functional t/f with LRAD with SBA- GOA LNOT MET< CGA  Short Term Goal 3: Pt will ambulate >50' with LRAD wtih SBA without LOB- GOAL NOT MET, CGA  Short Term Goal 4: Pt will ascend/descend curb step with LRAD with CGA- GOAL NOT MET< NT 2* fatigue  Short Term Goal 5: Pt will tolerate formal balance assessment and appropriate goal to be set - 12/28 Not met d/t cognition, arousal levels  Long Term Goals  Time Frame for Long Term Goals : 14 days 1/06/2025  Long Term Goal 1: Pt will complete bed mobility with Bekah  Long Term Goal 2: Pt will complete STS with LRAD with Bekah  Long Term Goal 3: Pt will complete functional t/f with LRAD with Sup  Long Term Goal 4: Pt will ambulate >150' with LRAD with SUP without LOB  Long Term Goal 5: Pt will ascend/descend curb step with LRAD with SUP    PLAN OF CARE/SAFETY  Physical Therapy Plan  General Plan: 5-7 times per week  Current Treatment Recommendations: Strengthening;ROM;Balance training;Functional mobility training;Transfer training;Endurance training;Wheelchair mobility training;Gait training;Stair training;Neuromuscular re-education;Manual;Cognitive  reorientation;Home exercise program;Safety education & training;Patient/Caregiver education & training;Equipment evaluation, education, & procurement;Modalities;Positioning;Therapeutic activities  Safety Devices  Type of Devices: Patient at risk for falls;All fall risk precautions in place;Call light within reach;Nurse notified;Gait belt;Left in chair;Chair alarm in place    EDUCATION  Education  Education Given To: Patient  Education Provided: Role of Therapy;Plan of Care;Precautions;Safety;Transfer Training;Fall Prevention Strategies;Mobility Training;Cognition  Education Provided Comments: Role of PT, safe progression of mobility, use of RW, TLSO and spinal px.  Education Method: Verbal  Barriers to Learning: Cognition;Hearing  Education Outcome: Continued education needed;Verbalized understanding        Therapy Time   Individual Concurrent Group Co-treatment   Time In 0900         Time Out 1000         Minutes 60           Timed Code Treatment Minutes: 60 Minutes       Dayan Vicente PT, 01/04/25 at 12:04 PM

## 2025-01-04 NOTE — PROGRESS NOTES
Fabiana Ceballos  1/4/2025  [id number]    Chief Complaint: SDH (subdural hematoma)    Subjective:   No acute events overnight. Today Fabiana is seen in her room without family present. She reports sleeping well last night and continues to appear more interactive than prior. She denies acute complaints at this time. Staples removed yesterday.     No new labs.     ROS: denies f/c, n/v, cp, sob    Objective:  Patient Vitals for the past 24 hrs:   BP Temp Temp src Pulse Resp SpO2   01/04/25 0828 (!) 140/84 98.3 °F (36.8 °C) Oral (!) 102 18 97 %   01/03/25 2130 (!) 140/90 97.7 °F (36.5 °C) Oral (!) 109 18 97 %   01/03/25 1745 136/61 98.3 °F (36.8 °C) Oral 85 18 98 %     Gen: No distress, alert  HEENT: Normocephalic, left craniotomy incision healing well with staples out  CV: extremities well perfused  Resp: No respiratory distress. On room air  Abd: Soft, nondistended   Ext: No edema.   Neuro: Alert, appropriate, speech fluent  Psych: appropriate mood and affect    Wt Readings from Last 3 Encounters:   12/31/24 72.7 kg (160 lb 4.4 oz)   11/15/24 76.2 kg (168 lb)   06/21/24 83.9 kg (185 lb)       Laboratory data:   Lab Results   Component Value Date    WBC 8.2 01/03/2025    HGB 11.4 (L) 01/03/2025    HCT 35.3 (L) 01/03/2025    MCV 83.4 01/03/2025     01/03/2025       Lab Results   Component Value Date/Time     01/03/2025 05:05 AM    K 4.1 01/03/2025 05:05 AM     01/03/2025 05:05 AM    CO2 26 01/03/2025 05:05 AM    BUN 18 01/03/2025 05:05 AM    CREATININE 0.6 01/03/2025 05:05 AM    GLUCOSE 98 01/03/2025 05:05 AM    CALCIUM 9.2 01/03/2025 05:05 AM        Therapy progress:  Physical therapy:  Bed Mobility:     Sit>supine:  Assistance Level: Minimal assistance  Skilled Clinical Factors: for BLE, maintaining spinal px  Supine>sit:  Assistance Level: Supervision  Skilled Clinical Factors: with cues for spinal precuations, HOB elevated  Transfers:  Surface: From bed, To chair with arms  Additional Factors: Hand  QID    Hypokalemia  - replace PRN    Hypomagnesemia  - continue daily replacement      Dysphagia  - on modified diet  - ST    C diff  - consulted GI, appreciate assistance. Continue dificid for 14 day course  -GI note reviewed- continue dificid 12/28     CAD  - history of stents   - ok to resume home asa on 12/27  - statin     HTN  - continue metoprolol  - resumed lisinopril, increase dose to 20 mg BID  - holding home HCTZ     Hypothyroidism  - levothyoxine      GERD  - pantoprazole     Bowels: adjust medications as needed for regular bowel movements      Bladder: Check PVR x 3.  IMC if PVR > 200ml or if any volume is > 500 ml.      Sleep: melatonin provided prn.      PPX  DVT: heparin   GI: not indicated     Follow up appointments: PCP, Neurosurgery     Therapy progress: Able to sit EOB and use sock aide with Robby   Services: HH PT, OT, ST, nursing; 24 hour assistance  DME: [employer]  EDOD: 1/10/24    Monica Colindres MD  1/4/2025, 12:17 PM

## 2025-01-04 NOTE — PROGRESS NOTES
Occupational Therapy  Facility/Department: [facility]  Rehabilitation Occupational Therapy Daily Treatment Note    Date: 25  Patient Name: Fabiana Ceballos       Room: 0155/0155-01  MRN: [id number]  Account: [id number]   : 1945  (79 y.o.) Gender: female         Past Medical History:  has a past medical history of Anesthesia, Arthritis, Hyperlipidemia, Hypertension, Lumbar herniated disc, Lumbar spondylosis, Lumbar stenosis, Prediabetes, Radiculopathy of lumbar region, Scoliosis, and Thyroid disease.  Past Surgical History:   has a past surgical history that includes Hysterectomy; Cholecystectomy; bladder repair; Colonoscopy; lumbar discectomy (2011); Total knee arthroplasty (Right, 2023); Coronary angioplasty; and Total knee arthroplasty (Right, 2023).    Restrictions  Restrictions/Precautions: Modified Diet, Fall Risk, Contact Precautions, General Precautions, ROM Restrictions  Spinal Precautions Comments: Per pt/family pt with spinal px and to wear TLSO until mid february  Other Position/Activity Restrictions: TLSO, soft and bite sized, recent back surgery 2024, L scalp incision with staples, contact-Cdiff, spinal px  Required Braces or Orthoses  Spinal: Thoracic Lumbar Sacral Orthotics  Spinal Other: when OOB, may don at EOB  Required Braces or Orthoses?: Yes    Subjective  Subjective: Pt seated in wc, agreeable to OT tx. /74, , SPO2 97%. Denies pain at rest  Restrictions/Precautions: Modified Diet;Fall Risk;Contact Precautions;General Precautions;ROM Restrictions       Objective   Cognition  Overall Cognitive Status: Exceptions  Arousal/Alertness: Delayed responses to stimuli;Inconsistent responses to stimuli  Following Commands: Follows one step commands with increased time  Attention Span: Attends with cues to redirect;Difficulty attending to directions;Difficulty dividing attention  Memory: Impaired;Decreased short term memory;Decreased recall of recent

## 2025-01-04 NOTE — PROGRESS NOTES
[facility]: ACUTE REHAB UNIT  SPEECH-LANGUAGE PATHOLOGY      [x] Daily  [] Weekly Care Conference Note  [] Discharge    Patient:Fabiana Ceballos      :1945  MRN:[id number]  Rehab Dx/Hx: SDH (subdural hematoma) [S06.5XAA]    Precautions: falls, contact   Home situation: lives alone. Indep with med management, finances, and all household tasks.  ST Dx: [] Aphasia  [] Dysarthria  [] Apraxia   [x] Oropharyngeal dysphagia [x] Cognitive Impairment  [] Other:   Date of Admit: 2024  Room #: 0155/0155-01    Current functional status (updated daily):         Pt being seen for : [] Speech/Language Treatment  [x] Dysphagia Treatment [x] Cognitive Treatment  [] Other:  Communication: [x]WFL  [] Aphasia  [] Dysarthria  [] Apraxia  [] Pragmatic Impairment [] Non-verbal  [] Hearing Loss  [] Other:   Cognition: [] WFL  [] Mild  [x] Moderate  [x] Severe [] Unable to Assess  [] Other:  Memory: [] WFL  [] Mild  [x] Moderate  [x] Severe [] Unable to Assess  [] Other:  Behavior: [] Alert  [x] Cooperative  [x]  Pleasant  [] Confused  [] Agitated  [] Uncooperative  [] Distractible [] Motivated  [] Self-Limiting [] Anxious  [] Other:  Endurance:  [] Adequate for participation in SLP sessions  [x] Reduced overall  [x] Lethargic  [] Other:  Safety: [] No concerns at this time  [x] Reduced insight into deficits  [x]  Reduced safety awareness [] Not following call light procedures  [] Unable to Assess  [] Other:    Current Diet Order:ADULT DIET; Dysphagia - Soft and Bite Sized  ADULT ORAL NUTRITION SUPPLEMENT; Dinner; Standard High Calorie/High Protein Oral Supplement  Swallowing Precautions: upright for all intake, stay upright for at least 30 mins after intake, small bites/sips, close supervision, oral care 2-3x/day to reduce adverse affects in the event of aspiration, increase physical mobility as able, alternate bites/sips, slow rate of intake, general GERD precautions, general aspiration precautions, and hold PO and contact SLP if s/s  units   Cognitive Tx 1230 1330 60 min / 4 units   Speech Tx -- -- --   Dysphagia Tx          Electronically Signed by       Ghazal Chin MA CCC-SLP SP#[id number]  Speech-Language Pathologist

## 2025-01-05 VITALS
HEIGHT: 64 IN | HEART RATE: 112 BPM | OXYGEN SATURATION: 97 % | BODY MASS INDEX: 27.36 KG/M2 | TEMPERATURE: 98 F | RESPIRATION RATE: 16 BRPM | SYSTOLIC BLOOD PRESSURE: 153 MMHG | DIASTOLIC BLOOD PRESSURE: 82 MMHG | WEIGHT: 160.27 LBS

## 2025-01-05 PROCEDURE — 6360000002 HC RX W HCPCS: Performed by: PHYSICAL MEDICINE & REHABILITATION

## 2025-01-05 PROCEDURE — 1280000000 HC REHAB R&B

## 2025-01-05 PROCEDURE — 6360000002 HC RX W HCPCS: Performed by: STUDENT IN AN ORGANIZED HEALTH CARE EDUCATION/TRAINING PROGRAM

## 2025-01-05 PROCEDURE — 2580000003 HC RX 258: Performed by: PHYSICAL MEDICINE & REHABILITATION

## 2025-01-05 PROCEDURE — 6370000000 HC RX 637 (ALT 250 FOR IP): Performed by: STUDENT IN AN ORGANIZED HEALTH CARE EDUCATION/TRAINING PROGRAM

## 2025-01-05 RX ORDER — TRAZODONE HYDROCHLORIDE 50 MG/1
50 TABLET, FILM COATED ORAL NIGHTLY PRN
Status: DISPENSED | OUTPATIENT
Start: 2025-01-05

## 2025-01-05 RX ADMIN — GABAPENTIN 200 MG: 100 CAPSULE ORAL at 19:55

## 2025-01-05 RX ADMIN — ASPIRIN 81 MG 81 MG: 81 TABLET ORAL at 08:49

## 2025-01-05 RX ADMIN — MEROPENEM 1000 MG: 1 INJECTION INTRAVENOUS at 01:01

## 2025-01-05 RX ADMIN — GABAPENTIN 200 MG: 100 CAPSULE ORAL at 05:14

## 2025-01-05 RX ADMIN — GABAPENTIN 200 MG: 100 CAPSULE ORAL at 16:15

## 2025-01-05 RX ADMIN — OXYCODONE 5 MG: 5 TABLET ORAL at 19:55

## 2025-01-05 RX ADMIN — GABAPENTIN 200 MG: 100 CAPSULE ORAL at 08:50

## 2025-01-05 RX ADMIN — HEPARIN SODIUM 5000 UNITS: 5000 INJECTION INTRAVENOUS; SUBCUTANEOUS at 05:14

## 2025-01-05 RX ADMIN — FIDAXOMICIN 200 MG: 200 TABLET, FILM COATED ORAL at 19:57

## 2025-01-05 RX ADMIN — OXYCODONE HYDROCHLORIDE AND ACETAMINOPHEN 500 MG: 500 TABLET ORAL at 08:51

## 2025-01-05 RX ADMIN — CETIRIZINE HYDROCHLORIDE 5 MG: 5 TABLET ORAL at 08:50

## 2025-01-05 RX ADMIN — DICLOFENAC SODIUM TOPICAL GEL, 1% 4 G: 10 GEL TOPICAL at 19:58

## 2025-01-05 RX ADMIN — Medication 1 TABLET: at 19:54

## 2025-01-05 RX ADMIN — OXYCODONE HYDROCHLORIDE AND ACETAMINOPHEN 500 MG: 500 TABLET ORAL at 19:55

## 2025-01-05 RX ADMIN — Medication 1 TABLET: at 08:50

## 2025-01-05 RX ADMIN — MEROPENEM 1000 MG: 1 INJECTION INTRAVENOUS at 08:58

## 2025-01-05 RX ADMIN — MEROPENEM 1000 MG: 1 INJECTION INTRAVENOUS at 16:15

## 2025-01-05 RX ADMIN — FIDAXOMICIN 200 MG: 200 TABLET, FILM COATED ORAL at 08:49

## 2025-01-05 RX ADMIN — FLUTICASONE PROPIONATE 1 SPRAY: 50 SPRAY, METERED NASAL at 19:58

## 2025-01-05 RX ADMIN — LEVOTHYROXINE SODIUM 50 MCG: 0.05 TABLET ORAL at 05:14

## 2025-01-05 RX ADMIN — Medication 5 MG: at 19:55

## 2025-01-05 RX ADMIN — METOPROLOL TARTRATE 25 MG: 25 TABLET, FILM COATED ORAL at 19:54

## 2025-01-05 RX ADMIN — PANTOPRAZOLE SODIUM 40 MG: 40 TABLET, DELAYED RELEASE ORAL at 05:14

## 2025-01-05 RX ADMIN — Medication 400 MG: at 08:49

## 2025-01-05 RX ADMIN — TRAZODONE HYDROCHLORIDE 50 MG: 50 TABLET ORAL at 21:41

## 2025-01-05 RX ADMIN — LISINOPRIL 20 MG: 20 TABLET ORAL at 19:55

## 2025-01-05 RX ADMIN — HEPARIN SODIUM 5000 UNITS: 5000 INJECTION INTRAVENOUS; SUBCUTANEOUS at 14:24

## 2025-01-05 RX ADMIN — ATORVASTATIN CALCIUM 10 MG: 10 TABLET, FILM COATED ORAL at 08:50

## 2025-01-05 ASSESSMENT — PAIN DESCRIPTION - ORIENTATION: ORIENTATION: RIGHT;LEFT

## 2025-01-05 ASSESSMENT — PAIN DESCRIPTION - FREQUENCY: FREQUENCY: INTERMITTENT

## 2025-01-05 ASSESSMENT — PAIN - FUNCTIONAL ASSESSMENT: PAIN_FUNCTIONAL_ASSESSMENT: ACTIVITIES ARE NOT PREVENTED

## 2025-01-05 ASSESSMENT — PAIN DESCRIPTION - LOCATION: LOCATION: HEAD;GENERALIZED

## 2025-01-05 ASSESSMENT — PAIN DESCRIPTION - ONSET: ONSET: GRADUAL

## 2025-01-05 ASSESSMENT — PAIN SCALES - WONG BAKER
WONGBAKER_NUMERICALRESPONSE: NO HURT
WONGBAKER_NUMERICALRESPONSE: NO HURT

## 2025-01-05 ASSESSMENT — PAIN DESCRIPTION - PAIN TYPE: TYPE: ACUTE PAIN

## 2025-01-05 ASSESSMENT — PAIN DESCRIPTION - DESCRIPTORS: DESCRIPTORS: ACHING

## 2025-01-05 ASSESSMENT — PAIN SCALES - GENERAL: PAINLEVEL_OUTOF10: 7

## 2025-01-05 NOTE — PROGRESS NOTES
Fabiana Ceballos  1/5/2025  [id number]    Chief Complaint: SDH (subdural hematoma)    Subjective:   No acute events overnight. Today Fabiana is seen in her room without family present. She continues to appear more alert than prior. She reports feeling well and denies current headache. She denies loose stools.     No new labs.     ROS: denies f/c, n/v, cp, sob    Objective:  Patient Vitals for the past 24 hrs:   BP Temp Temp src Pulse Resp SpO2   01/05/25 0843 108/60 98.4 °F (36.9 °C) Oral (!) 110 16 94 %   01/04/25 2125 -- -- -- -- 16 --   01/04/25 2045 116/60 98.1 °F (36.7 °C) Oral (!) 104 18 98 %     Gen: No distress, alert, seated up in bed  HEENT: Normocephalic, left craniotomy incision healing well with staples out  CV: extremities well perfused  Resp: No respiratory distress. On room air  Abd: Soft, nondistended   Ext: No edema.   Neuro: Alert, appropriate, speech fluent  Psych: appropriate mood and affect    Wt Readings from Last 3 Encounters:   12/31/24 72.7 kg (160 lb 4.4 oz)   11/15/24 76.2 kg (168 lb)   06/21/24 83.9 kg (185 lb)       Laboratory data:   Lab Results   Component Value Date    WBC 8.2 01/03/2025    HGB 11.4 (L) 01/03/2025    HCT 35.3 (L) 01/03/2025    MCV 83.4 01/03/2025     01/03/2025       Lab Results   Component Value Date/Time     01/03/2025 05:05 AM    K 4.1 01/03/2025 05:05 AM     01/03/2025 05:05 AM    CO2 26 01/03/2025 05:05 AM    BUN 18 01/03/2025 05:05 AM    CREATININE 0.6 01/03/2025 05:05 AM    GLUCOSE 98 01/03/2025 05:05 AM    CALCIUM 9.2 01/03/2025 05:05 AM        Therapy progress:  Physical therapy:  Bed Mobility:     Sit>supine:  Assistance Level: Minimal assistance  Skilled Clinical Factors: for BLE, maintaining spinal px  Supine>sit:  Assistance Level: Supervision  Skilled Clinical Factors: with cues for spinal precuations, HOB elevated  Transfers:  Surface: From bed, To chair with arms  Additional Factors: Hand placement cues, Increased time to complete,

## 2025-01-05 NOTE — PLAN OF CARE
Problem: Safety - Adult  Goal: Free from fall injury  1/5/2025 1249 by Carleen Flynn RN  Outcome: Progressing  Flowsheets (Taken 1/5/2025 1249)  Free From Fall Injury:   Instruct family/caregiver on patient safety   Based on caregiver fall risk screen, instruct family/caregiver to ask for assistance with transferring infant if caregiver noted to have fall

## 2025-01-06 LAB
ANION GAP SERPL CALCULATED.3IONS-SCNC: 7 MMOL/L (ref 3–16)
BASOPHILS # BLD: 0 K/UL (ref 0–0.2)
BASOPHILS NFR BLD: 0.4 %
BUN SERPL-MCNC: 19 MG/DL (ref 7–20)
CALCIUM SERPL-MCNC: 9.6 MG/DL (ref 8.3–10.6)
CHLORIDE SERPL-SCNC: 107 MMOL/L (ref 99–110)
CO2 SERPL-SCNC: 28 MMOL/L (ref 21–32)
CREAT SERPL-MCNC: 0.6 MG/DL (ref 0.6–1.2)
DEPRECATED RDW RBC AUTO: 19.6 % (ref 12.4–15.4)
EOSINOPHIL # BLD: 0.2 K/UL (ref 0–0.6)
EOSINOPHIL NFR BLD: 2.2 %
GFR SERPLBLD CREATININE-BSD FMLA CKD-EPI: 90 ML/MIN/{1.73_M2}
GLUCOSE SERPL-MCNC: 109 MG/DL (ref 70–99)
HCT VFR BLD AUTO: 34.3 % (ref 36–48)
HGB BLD-MCNC: 11.3 G/DL (ref 12–16)
LYMPHOCYTES # BLD: 1.6 K/UL (ref 1–5.1)
LYMPHOCYTES NFR BLD: 20.5 %
MCH RBC QN AUTO: 27.8 PG (ref 26–34)
MCHC RBC AUTO-ENTMCNC: 32.9 G/DL (ref 31–36)
MCV RBC AUTO: 84.5 FL (ref 80–100)
MONOCYTES # BLD: 0.5 K/UL (ref 0–1.3)
MONOCYTES NFR BLD: 6.4 %
NEUTROPHILS # BLD: 5.6 K/UL (ref 1.7–7.7)
NEUTROPHILS NFR BLD: 70.5 %
PLATELET # BLD AUTO: 286 K/UL (ref 135–450)
PMV BLD AUTO: 8 FL (ref 5–10.5)
POTASSIUM SERPL-SCNC: 4.6 MMOL/L (ref 3.5–5.1)
RBC # BLD AUTO: 4.06 M/UL (ref 4–5.2)
SODIUM SERPL-SCNC: 142 MMOL/L (ref 136–145)
WBC # BLD AUTO: 8 K/UL (ref 4–11)

## 2025-01-06 PROCEDURE — 97530 THERAPEUTIC ACTIVITIES: CPT

## 2025-01-06 PROCEDURE — 2580000003 HC RX 258: Performed by: PHYSICAL MEDICINE & REHABILITATION

## 2025-01-06 PROCEDURE — 1280000000 HC REHAB R&B

## 2025-01-06 PROCEDURE — 97130 THER IVNTJ EA ADDL 15 MIN: CPT

## 2025-01-06 PROCEDURE — 85025 COMPLETE CBC W/AUTO DIFF WBC: CPT

## 2025-01-06 PROCEDURE — 97116 GAIT TRAINING THERAPY: CPT

## 2025-01-06 PROCEDURE — 97129 THER IVNTJ 1ST 15 MIN: CPT

## 2025-01-06 PROCEDURE — 6360000002 HC RX W HCPCS: Performed by: PHYSICAL MEDICINE & REHABILITATION

## 2025-01-06 PROCEDURE — 36415 COLL VENOUS BLD VENIPUNCTURE: CPT

## 2025-01-06 PROCEDURE — 97110 THERAPEUTIC EXERCISES: CPT

## 2025-01-06 PROCEDURE — 97112 NEUROMUSCULAR REEDUCATION: CPT

## 2025-01-06 PROCEDURE — 80048 BASIC METABOLIC PNL TOTAL CA: CPT

## 2025-01-06 PROCEDURE — 6370000000 HC RX 637 (ALT 250 FOR IP): Performed by: STUDENT IN AN ORGANIZED HEALTH CARE EDUCATION/TRAINING PROGRAM

## 2025-01-06 PROCEDURE — 6360000002 HC RX W HCPCS: Performed by: STUDENT IN AN ORGANIZED HEALTH CARE EDUCATION/TRAINING PROGRAM

## 2025-01-06 PROCEDURE — 92526 ORAL FUNCTION THERAPY: CPT

## 2025-01-06 PROCEDURE — 97535 SELF CARE MNGMENT TRAINING: CPT

## 2025-01-06 RX ORDER — QUETIAPINE FUMARATE 25 MG/1
25 TABLET, FILM COATED ORAL 2 TIMES DAILY PRN
Status: DISCONTINUED | OUTPATIENT
Start: 2025-01-06 | End: 2025-01-09 | Stop reason: HOSPADM

## 2025-01-06 RX ADMIN — ASPIRIN 81 MG 81 MG: 81 TABLET ORAL at 09:46

## 2025-01-06 RX ADMIN — Medication 1 TABLET: at 09:45

## 2025-01-06 RX ADMIN — Medication 5 MG: at 22:50

## 2025-01-06 RX ADMIN — LEVOTHYROXINE SODIUM 50 MCG: 0.05 TABLET ORAL at 04:28

## 2025-01-06 RX ADMIN — FIDAXOMICIN 200 MG: 200 TABLET, FILM COATED ORAL at 22:50

## 2025-01-06 RX ADMIN — GABAPENTIN 200 MG: 100 CAPSULE ORAL at 00:40

## 2025-01-06 RX ADMIN — FIDAXOMICIN 200 MG: 200 TABLET, FILM COATED ORAL at 09:44

## 2025-01-06 RX ADMIN — OXYCODONE 5 MG: 5 TABLET ORAL at 22:50

## 2025-01-06 RX ADMIN — ATORVASTATIN CALCIUM 10 MG: 10 TABLET, FILM COATED ORAL at 09:45

## 2025-01-06 RX ADMIN — DICLOFENAC SODIUM TOPICAL GEL, 1% 4 G: 10 GEL TOPICAL at 09:43

## 2025-01-06 RX ADMIN — LISINOPRIL 20 MG: 20 TABLET ORAL at 22:52

## 2025-01-06 RX ADMIN — QUETIAPINE FUMARATE 25 MG: 25 TABLET ORAL at 00:40

## 2025-01-06 RX ADMIN — Medication 400 MG: at 09:46

## 2025-01-06 RX ADMIN — OXYCODONE HYDROCHLORIDE AND ACETAMINOPHEN 500 MG: 500 TABLET ORAL at 22:50

## 2025-01-06 RX ADMIN — QUETIAPINE FUMARATE 25 MG: 25 TABLET ORAL at 22:50

## 2025-01-06 RX ADMIN — OXYCODONE HYDROCHLORIDE AND ACETAMINOPHEN 500 MG: 500 TABLET ORAL at 09:46

## 2025-01-06 RX ADMIN — Medication 1 TABLET: at 22:49

## 2025-01-06 RX ADMIN — GABAPENTIN 200 MG: 100 CAPSULE ORAL at 22:50

## 2025-01-06 RX ADMIN — HEPARIN SODIUM 5000 UNITS: 5000 INJECTION INTRAVENOUS; SUBCUTANEOUS at 03:54

## 2025-01-06 RX ADMIN — TRAZODONE HYDROCHLORIDE 50 MG: 50 TABLET ORAL at 22:50

## 2025-01-06 RX ADMIN — METOPROLOL TARTRATE 25 MG: 25 TABLET, FILM COATED ORAL at 22:51

## 2025-01-06 RX ADMIN — GABAPENTIN 200 MG: 100 CAPSULE ORAL at 09:45

## 2025-01-06 RX ADMIN — DICLOFENAC SODIUM TOPICAL GEL, 1% 4 G: 10 GEL TOPICAL at 12:55

## 2025-01-06 RX ADMIN — CETIRIZINE HYDROCHLORIDE 5 MG: 5 TABLET ORAL at 09:45

## 2025-01-06 RX ADMIN — HEPARIN SODIUM 5000 UNITS: 5000 INJECTION INTRAVENOUS; SUBCUTANEOUS at 14:55

## 2025-01-06 RX ADMIN — FLUTICASONE PROPIONATE 1 SPRAY: 50 SPRAY, METERED NASAL at 23:06

## 2025-01-06 RX ADMIN — HEPARIN SODIUM 5000 UNITS: 5000 INJECTION INTRAVENOUS; SUBCUTANEOUS at 22:52

## 2025-01-06 RX ADMIN — Medication 1 TABLET: at 09:46

## 2025-01-06 RX ADMIN — MEROPENEM 1000 MG: 1 INJECTION INTRAVENOUS at 00:19

## 2025-01-06 RX ADMIN — GABAPENTIN 200 MG: 100 CAPSULE ORAL at 14:55

## 2025-01-06 RX ADMIN — DICLOFENAC SODIUM TOPICAL GEL, 1% 4 G: 10 GEL TOPICAL at 22:57

## 2025-01-06 RX ADMIN — PANTOPRAZOLE SODIUM 40 MG: 40 TABLET, DELAYED RELEASE ORAL at 04:28

## 2025-01-06 ASSESSMENT — PAIN DESCRIPTION - ORIENTATION: ORIENTATION: MID

## 2025-01-06 ASSESSMENT — PAIN - FUNCTIONAL ASSESSMENT: PAIN_FUNCTIONAL_ASSESSMENT: ACTIVITIES ARE NOT PREVENTED

## 2025-01-06 ASSESSMENT — PAIN DESCRIPTION - FREQUENCY: FREQUENCY: INTERMITTENT

## 2025-01-06 ASSESSMENT — PAIN SCALES - GENERAL
PAINLEVEL_OUTOF10: 8
PAINLEVEL_OUTOF10: 0

## 2025-01-06 ASSESSMENT — PAIN DESCRIPTION - ONSET: ONSET: PROGRESSIVE

## 2025-01-06 ASSESSMENT — PAIN DESCRIPTION - LOCATION: LOCATION: BACK;HEAD

## 2025-01-06 ASSESSMENT — PAIN DESCRIPTION - DESCRIPTORS: DESCRIPTORS: ACHING

## 2025-01-06 ASSESSMENT — PAIN DESCRIPTION - PAIN TYPE: TYPE: ACUTE PAIN

## 2025-01-06 NOTE — PROGRESS NOTES
events;Decreased recall of precautions  Safety Judgement: Decreased awareness of need for assistance;Decreased awareness of need for safety  Problem Solving: Assistance required to correct errors made;Assistance required to identify errors made;Assistance required to implement solutions;Assistance required to generate solutions  Insights: Decreased awareness of deficits  Initiation: Requires cues for some  Sequencing: Requires cues for some  Cognition Comment: Patient requiring increased time for cognitive processing, improved processing speed as session progressed  Orientation  Overall Orientation Status: Impaired  Orientation Level: Oriented to time;Oriented to situation;Disoriented to place;Disoriented to person         ADL  Grooming/Oral Hygiene  Assistance Level: Minimal assistance;Set-up;Increased time to complete  Skilled Clinical Factors: min A for [place] to squeeze toothpaste onto brush, [place] to line up cap onto toothpaste tube but able to screw on own; CGA for standing balance while completing oral hygiene, able to terminate task on own without cues; standing for ~6 minutes at sink          Functional Mobility  Device: Rolling walker  Activity: To/From bathroom;Retrieve items  Assistance Level: Minimal assistance  Skilled Clinical Factors: CGA for majority of mobility, min A to correct a few LOB when turning/reaching to grasp bean bags, standing for 6:00 at sink, 3:00 for exercises, and 4:45 and 3:30 for collection of bean bags around gym  Transfers  Surface: Wheelchair  Additional Factors: Set-up;Verbal cues;Hand placement cues;Increased time to complete;With handrails  Device: Walker  Sit to Stand  Assistance Level: Contact guard assist  Stand to Sit  Assistance Level: Minimal assistance  Skilled Clinical Factors: cues and assist to turn fully prior to sitting down   Neuromuscular Education  Neuromuscular education: Yes  Other: Improved visual scanning to locate all bean bags around gym for 10 bags x2 with

## 2025-01-06 NOTE — PROGRESS NOTES
of aspiration or worsening respiratory status develop.       Date: 1/6/2025      Tx session 1  1140 - 1210 Tx session 2  1410 - 1440   Total Timed Code Min 0 30   Total Treatment Minutes 30 30   Individual Treatment Minutes 30 30   Group Treatment Minutes 0 0   Co-Treat Minutes 0 0   Variance/Reason:  N/A N/A   Pain No c/o No c/o   Pain Intervention [] RN notified  [] Repositioned  [] Intervention offered and patient declined  [x] N/A  [] Other:  [] RN notified  [] Repositioned  [] Intervention offered and patient declined  [] N/A  [] Other:   Subjective     Pt awake upon SLP's arrival with RN assisting with setting up meal tray. Pt becoming increasingly lethargic during session.  Upright in w/c  for session.  Pt finishing up  in bathroom w/ PCA - returned to bed. Pt partially reclined in bed for session - alert and agreeable to tx.    Objective:  Goals     Dysphagia Goals:  Short-term Goals  Timeframe for Short-term Goals: extend goals through 01/09/25    Goal 1: The patient will tolerate recommended diet with no clinical s/s of aspiration 5/5    Pt seen with lunch meal  -soft and  bite sized solids,  puree, and thin liquids via cup  -occ left anterior bolus loss  -pt with slow and mildly prolonged mastication   -grossly adequate A-P transit  -intermittent lingual residue with soft solids  -no overt s/s of aspiration  -no coughing, throat clearing, wet vocal quality    Pt becoming increasingly lethargic during meal. Pt initially able to self-feed, but then required increased assistance throughout meal. At end of session, pt required SLP to totally assistance w/ feeding, and required verbal cues to initiate swallow.    Goal not directly targeted.    Goal 2: The patient/caregiver will demonstrate understanding of compensatory swallow strategies, for improved swallow safety    SLP edu pt re:  safe swallow strategies - small bites/sips, slow rate, alternate liquids and solids, fully upright, fully alert for all PO. Pt

## 2025-01-06 NOTE — PROGRESS NOTES
person    Functional Mobility  Supine to Sit  Assistance Level: Contact guard assist  Skilled Clinical Factors: with cues for spinal precuations, HOB elevated  Balance  Sitting Balance: Moderate assistance  Standing Balance: Minimal assistance  Standing Balance  Activity: while sitting EOb, pt requires mod A of PT while RN assisted to don TLSO, pt unable to maintain upright posture this am. pt utilized commode, min A for static/dyn standing while completing pericare and clothing management with posterior lean with no righting reactions wtih 1-2 UE support on grab bar  Sit to Stand  Assistance Level: Minimal assistance  Skilled Clinical Factors: from EOB and commode to grab bar  Stand to Sit  Assistance Level: Minimal assistance  Stand Pivot  Assistance Level: Minimal assistance  Skilled Clinical Factors: EOB to w/c adn w/c <> commode  Pt in w/c at end of session with call light/needs and alarm donned      Second Session    Pt found sleeping in w/c, decreased attention span and increased drowsiness noted this pm. Pt dozing to sleep every few minutes requiring therapist to wake pt. Pt requires increased assist this pm due to increased drowsiness and hand over hand to initiate most tasks    Sit to stand wc to RW with min-mod A    Pt ambulated 20 ft with RW, TD (wc follow) and min A. Pt often closing eyes during gait, assist to manage RW, maintain linear path and demos poor gait swing/heel strike.     Pt completed 3 min on SCIFIT level 1 with BLEs, therapist provided hand over hand to initiate and pt requires multiple re-directions from therapist to attend to task    Attempted w/c mobility room, pt unable to keep eyes open to participate, thus therapist donned leg rests for safety    Stand pivot wc to EOB with min -mod A    Sit to supine mod A for BLE  Pt in bed at end of session with call light/needs within reach and alarm donned.   ASSESSMENT/PROGRESS TOWARDS GOALS  Vital Signs  Pulse: 100  Heart Rate Source:

## 2025-01-06 NOTE — PLAN OF CARE
Problem: Safety - Adult  Goal: Free from fall injury  1/6/2025 0915 by Rylan Alfaro, RN  Outcome: Progressing  1/5/2025 2331 by Christie Justice, RN  Outcome: Progressing

## 2025-01-06 NOTE — PROGRESS NOTES
Patients [employer] alarmed. This RN went into patient room, patient was already standing at side of bed with an unsteady posture. Patient stated she was \"going to turn oven on to make corn bread and 2 cans of beans\". This RN attempted to reorient patient with no success. Patient then became agitated that she could not make cornbread. This RN was able to convince patient to sit back down in bed. With help of PCA this RN was able to get patient laid back down in bed. Patient remained agitated and started to become combative when staff was attempting to help her straighten out in bed.  Patient in bed, bed is in lowest position with wheels locked. Bed alarm is on call light and side table are within reach. [employer] monitoring ongoing and MD notified.

## 2025-01-06 NOTE — PLAN OF CARE
Problem: Pain  Goal: Verbalizes/displays adequate comfort level or baseline comfort level  Outcome: Progressing     Problem: Safety - Adult  Goal: Free from fall injury  1/5/2025 2331 by Christie Justice, RN  Outcome: Progressing  1/5/2025 1249 by Carleen Flynn, RN  Outcome: Progressing  Flowsheets (Taken 1/5/2025 1249)  Free From Fall Injury:   Instruct family/caregiver on patient safety   Based on caregiver fall risk screen, instruct family/caregiver to ask for assistance with transferring infant if caregiver noted to have fall risk factors

## 2025-01-07 PROCEDURE — 97130 THER IVNTJ EA ADDL 15 MIN: CPT

## 2025-01-07 PROCEDURE — 97530 THERAPEUTIC ACTIVITIES: CPT

## 2025-01-07 PROCEDURE — 97129 THER IVNTJ 1ST 15 MIN: CPT

## 2025-01-07 PROCEDURE — 97116 GAIT TRAINING THERAPY: CPT

## 2025-01-07 PROCEDURE — 6360000002 HC RX W HCPCS: Performed by: STUDENT IN AN ORGANIZED HEALTH CARE EDUCATION/TRAINING PROGRAM

## 2025-01-07 PROCEDURE — 97535 SELF CARE MNGMENT TRAINING: CPT

## 2025-01-07 PROCEDURE — 6370000000 HC RX 637 (ALT 250 FOR IP): Performed by: STUDENT IN AN ORGANIZED HEALTH CARE EDUCATION/TRAINING PROGRAM

## 2025-01-07 PROCEDURE — 1280000000 HC REHAB R&B

## 2025-01-07 RX ORDER — LISINOPRIL 10 MG/1
10 TABLET ORAL 2 TIMES DAILY
Status: DISCONTINUED | OUTPATIENT
Start: 2025-01-07 | End: 2025-01-09

## 2025-01-07 RX ADMIN — HEPARIN SODIUM 5000 UNITS: 5000 INJECTION INTRAVENOUS; SUBCUTANEOUS at 05:38

## 2025-01-07 RX ADMIN — HEPARIN SODIUM 5000 UNITS: 5000 INJECTION INTRAVENOUS; SUBCUTANEOUS at 14:33

## 2025-01-07 RX ADMIN — DICLOFENAC SODIUM TOPICAL GEL, 1% 4 G: 10 GEL TOPICAL at 09:00

## 2025-01-07 RX ADMIN — DICLOFENAC SODIUM TOPICAL GEL, 1% 4 G: 10 GEL TOPICAL at 19:50

## 2025-01-07 RX ADMIN — CETIRIZINE HYDROCHLORIDE 5 MG: 5 TABLET ORAL at 09:01

## 2025-01-07 RX ADMIN — FIDAXOMICIN 200 MG: 200 TABLET, FILM COATED ORAL at 19:48

## 2025-01-07 RX ADMIN — FIDAXOMICIN 200 MG: 200 TABLET, FILM COATED ORAL at 09:01

## 2025-01-07 RX ADMIN — LEVOTHYROXINE SODIUM 50 MCG: 0.05 TABLET ORAL at 05:38

## 2025-01-07 RX ADMIN — OXYCODONE HYDROCHLORIDE AND ACETAMINOPHEN 500 MG: 500 TABLET ORAL at 09:01

## 2025-01-07 RX ADMIN — Medication 1 TABLET: at 19:49

## 2025-01-07 RX ADMIN — Medication 1 TABLET: at 09:01

## 2025-01-07 RX ADMIN — GABAPENTIN 200 MG: 100 CAPSULE ORAL at 14:33

## 2025-01-07 RX ADMIN — PANTOPRAZOLE SODIUM 40 MG: 40 TABLET, DELAYED RELEASE ORAL at 05:38

## 2025-01-07 RX ADMIN — FLUTICASONE PROPIONATE 1 SPRAY: 50 SPRAY, METERED NASAL at 19:50

## 2025-01-07 RX ADMIN — TRAZODONE HYDROCHLORIDE 50 MG: 50 TABLET ORAL at 19:49

## 2025-01-07 RX ADMIN — ASPIRIN 81 MG 81 MG: 81 TABLET ORAL at 09:01

## 2025-01-07 RX ADMIN — Medication 5 MG: at 19:48

## 2025-01-07 RX ADMIN — HEPARIN SODIUM 5000 UNITS: 5000 INJECTION INTRAVENOUS; SUBCUTANEOUS at 20:05

## 2025-01-07 RX ADMIN — QUETIAPINE FUMARATE 25 MG: 25 TABLET ORAL at 19:49

## 2025-01-07 RX ADMIN — GABAPENTIN 200 MG: 100 CAPSULE ORAL at 19:49

## 2025-01-07 RX ADMIN — METOPROLOL TARTRATE 25 MG: 25 TABLET, FILM COATED ORAL at 19:50

## 2025-01-07 RX ADMIN — DICLOFENAC SODIUM TOPICAL GEL, 1% 4 G: 10 GEL TOPICAL at 17:08

## 2025-01-07 RX ADMIN — LISINOPRIL 10 MG: 10 TABLET ORAL at 19:49

## 2025-01-07 RX ADMIN — GABAPENTIN 200 MG: 100 CAPSULE ORAL at 05:38

## 2025-01-07 RX ADMIN — GABAPENTIN 200 MG: 100 CAPSULE ORAL at 09:01

## 2025-01-07 RX ADMIN — ATORVASTATIN CALCIUM 10 MG: 10 TABLET, FILM COATED ORAL at 09:01

## 2025-01-07 RX ADMIN — OXYCODONE HYDROCHLORIDE AND ACETAMINOPHEN 500 MG: 500 TABLET ORAL at 19:49

## 2025-01-07 RX ADMIN — DICLOFENAC SODIUM TOPICAL GEL, 1% 4 G: 10 GEL TOPICAL at 14:33

## 2025-01-07 RX ADMIN — Medication 400 MG: at 09:01

## 2025-01-07 ASSESSMENT — PAIN SCALES - GENERAL
PAINLEVEL_OUTOF10: 0
PAINLEVEL_OUTOF10: 0

## 2025-01-07 ASSESSMENT — PAIN DESCRIPTION - LOCATION: LOCATION: KNEE

## 2025-01-07 ASSESSMENT — PAIN DESCRIPTION - DESCRIPTORS: DESCRIPTORS: DISCOMFORT

## 2025-01-07 ASSESSMENT — PAIN - FUNCTIONAL ASSESSMENT: PAIN_FUNCTIONAL_ASSESSMENT: PREVENTS OR INTERFERES SOME ACTIVE ACTIVITIES AND ADLS

## 2025-01-07 ASSESSMENT — PAIN DESCRIPTION - ORIENTATION: ORIENTATION: MID

## 2025-01-07 NOTE — PROGRESS NOTES
Physical Therapy  Facility/Department: [facility]  Rehabilitation Physical Therapy Treatment Note    NAME: Fabiana Ceballos  : 1945 (79 y.o.)  MRN: [id number]  CODE STATUS: Full Code    Date of Service: 25       Restrictions:  Restrictions/Precautions: Modified Diet, Fall Risk, Contact Precautions, General Precautions, ROM Restrictions  Required Braces or Orthoses  Spinal: Thoracic Lumbar Sacral Orthotics  Spinal Other: when OOB, may don at EOB  Position Activity Restriction  Spinal Precautions: No Lifting, No Twisting, No Bending  Spinal Precautions Comments: Per pt/family pt with spinal px and to wear TLSO until mid february  Other Position/Activity Restrictions: TLSO, soft and bite sized, recent back surgery 2024, L scalp incision, contact-Cdiff, spinal px     SUBJECTIVE  Subjective: found sleeping in bed  Pain: no pain indicated       OBJECTIVE  Cognition  Overall Cognitive Status: Exceptions  Arousal/Alertness: Delayed responses to stimuli  Following Commands: Follows one step commands with increased time  Attention Span: Attends with cues to redirect;Difficulty attending to directions;Difficulty dividing attention  Memory: Impaired;Decreased short term memory;Decreased recall of recent events;Decreased recall of precautions  Safety Judgement: Decreased awareness of need for assistance;Decreased awareness of need for safety  Problem Solving: Assistance required to correct errors made;Assistance required to identify errors made;Assistance required to implement solutions;Assistance required to generate solutions  Insights: Decreased awareness of deficits  Initiation: Requires cues for some  Sequencing: Requires cues for some  Cognition Comment: Patient requiring increased time for cognitive processing, improved processing speed as session progressed  Orientation  Overall Orientation Status: Impaired  Orientation Level: Oriented to time;Oriented to situation;Disoriented to place;Disoriented to

## 2025-01-07 NOTE — PLAN OF CARE
At risk for skin breakdown, see Geoff scale. Unable to repositin self in bed. Turn  and reposition every 2 hours. Heels elevated off bed. Protective barrier placed as needed. Patient kept clean and dry. Pillows used for positioning. Will continue to monitor for skin breakdown.

## 2025-01-07 NOTE — PROGRESS NOTES
demonstrated significantly reduced thought organization/recall skills during completion of word progression task. Continuing to recommend ongoing ST services and 24 hour assist at this time.    Plan: Continue as per plan of care.           Barriers toward progress: Confusion, Cognitive deficit, Limited safety awareness, Limited participation, Limited insight into deficits, and Unrealistic expectations  Discharge recommendations:  [] Home independently  [] Home with assistance [x]  24 hour supervision  [] ECF [] Other:  Continued Tx Upon Discharge: ? [x] Yes [] No [] TBD based on progress while on ARU [] Vital Stim indicated [] Other:   Estimated discharge date: 01/10/25    Interventions used this date:  [] Speech/Language Treatment  [] Instruction in HEP [] Group [] Dysphagia Treatment [x] Cognitive Treatment   [] Other:      Total Time Breakdown / Charges    Time in Time out Total Time / units   Cognitive Tx 1000 1100 60 mins / 4 units    Speech Tx -- -- --   Dysphagia Tx -- -- --       Electronically Signed by     Kelsi Flower M.A CCC-SLP #[id number]  Speech-Language Pathologist

## 2025-01-07 NOTE — PROGRESS NOTES
Occupational Therapy  Facility/Department: [facility]  Rehabilitation Occupational Therapy Daily Treatment Note    Date: 25  Patient Name: Fabiana Ceballos       Room: 0155/0155-01  MRN: [id number]  Account: [id number]   : 1945  (79 y.o.) Gender: female                    Past Medical History:  has a past medical history of Anesthesia, Arthritis, Hyperlipidemia, Hypertension, Lumbar herniated disc, Lumbar spondylosis, Lumbar stenosis, Prediabetes, Radiculopathy of lumbar region, Scoliosis, and Thyroid disease.  Past Surgical History:   has a past surgical history that includes Hysterectomy; Cholecystectomy; bladder repair; Colonoscopy; lumbar discectomy (2011); Total knee arthroplasty (Right, 2023); Coronary angioplasty; and Total knee arthroplasty (Right, 2023).    Restrictions  Restrictions/Precautions: Modified Diet, Fall Risk, Contact Precautions, General Precautions, ROM Restrictions  Spinal Precautions Comments: Per pt/family pt with spinal px and to wear TLSO until mid february  Other Position/Activity Restrictions: TLSO, soft and bite sized, recent back surgery 2024, L scalp incision, contact-Cdiff, spinal px  Required Braces or Orthoses  Spinal: Thoracic Lumbar Sacral Orthotics  Spinal Other: when OOB, may don at EOB  Required Braces or Orthoses?: Yes    Subjective  Subjective: Pt in bed, sleeping, easily arousable, agreeable to OT session, /62, HR 90, O2 sats 98% on RA.  Restrictions/Precautions: Modified Diet;Fall Risk;Contact Precautions;General Precautions;ROM Restrictions             Objective     Cognition  Overall Cognitive Status: Exceptions  Arousal/Alertness: Delayed responses to stimuli  Following Commands: Follows one step commands with increased time  Attention Span: Attends with cues to redirect;Difficulty attending to directions;Difficulty dividing attention  Memory: Impaired;Decreased short term memory;Decreased recall of recent events;Decreased recall of

## 2025-01-07 NOTE — PROGRESS NOTES
Level: Dependent  Skilled Clinical Factors: able to bathe B thighs while seated with max VCs, assist to bathe lower legs/feet with long handled sponge able to minimally assist with sponge but keeping eyes closed and poor thoroughness, max A to stand and assist of second person to bathe buttocks in stance  UE Dressing  Assistance Level: Minimal assistance  Skilled Clinical Factors: seated EOB to don t shirt and don shirt and sweatshirt with min A to orient shirt correctly  LE Dressing  Equipment Provided: Reachers  Assistance Level: Dependent  Skilled Clinical Factors: assist to don BLEs into brief and pants, max A to stand and assist of 2nd person to manage brief/pants over hips  Putting On/Taking Off Footwear  Equipment Provided: Sock aid  Assistance Level: Minimal assistance  Skilled Clinical Factors: Pt donned bilateral socks independently, only needing assist to untangle sock aid from legs  Toileting  Assistance Level: Stand by assist  Skilled Clinical Factors: good pants management    Speech therapy:    ADULT DIET; Dysphagia - Soft and Bite Sized  ADULT ORAL NUTRITION SUPPLEMENT; Dinner; Standard High Calorie/High Protein Oral Supplement    Body mass index is 28.58 kg/m².    Assessment and Plan:  Fabiana Ceballos is an 79 year old female with a past medical history significant for CAD s/p PCI, prior DVT (after TKA), HTN, HLD, T5-sacral fusion (8/2024) who presented to [facility] on 12/18/24 from clinic after a fall with headache and AMS. She was found to have left SDH s/p left craniotomy. She was admitted to [facility] on 12/24/24 due to functional deficits below her baseline.      Left SDH  - s/p left craniotomy 12/19  - remove staples 1/3, confirmed with [facility]  - goal SBP<160  - on lower dose of home gabapentin  - completed 7 day course of Keppra prophylaxis per Neurosurgery  - PT, OT, ST  - increased fatigue and decreased participation 12/30. CT head obtained 12/30. discontinued seroquel qhs. UA positive for UTI  -

## 2025-01-08 LAB
ANION GAP SERPL CALCULATED.3IONS-SCNC: 9 MMOL/L (ref 3–16)
BASOPHILS # BLD: 0 K/UL (ref 0–0.2)
BASOPHILS NFR BLD: 0.5 %
BUN SERPL-MCNC: 20 MG/DL (ref 7–20)
CALCIUM SERPL-MCNC: 9.4 MG/DL (ref 8.3–10.6)
CHLORIDE SERPL-SCNC: 106 MMOL/L (ref 99–110)
CO2 SERPL-SCNC: 27 MMOL/L (ref 21–32)
CREAT SERPL-MCNC: 0.5 MG/DL (ref 0.6–1.2)
DEPRECATED RDW RBC AUTO: 19.4 % (ref 12.4–15.4)
EOSINOPHIL # BLD: 0.3 K/UL (ref 0–0.6)
EOSINOPHIL NFR BLD: 3.7 %
GFR SERPLBLD CREATININE-BSD FMLA CKD-EPI: >90 ML/MIN/{1.73_M2}
GLUCOSE SERPL-MCNC: 88 MG/DL (ref 70–99)
HCT VFR BLD AUTO: 34.6 % (ref 36–48)
HGB BLD-MCNC: 11.4 G/DL (ref 12–16)
LYMPHOCYTES # BLD: 2.2 K/UL (ref 1–5.1)
LYMPHOCYTES NFR BLD: 30.4 %
MCH RBC QN AUTO: 28 PG (ref 26–34)
MCHC RBC AUTO-ENTMCNC: 32.9 G/DL (ref 31–36)
MCV RBC AUTO: 85.1 FL (ref 80–100)
MONOCYTES # BLD: 0.5 K/UL (ref 0–1.3)
MONOCYTES NFR BLD: 6.9 %
NEUTROPHILS # BLD: 4.3 K/UL (ref 1.7–7.7)
NEUTROPHILS NFR BLD: 58.5 %
PLATELET # BLD AUTO: 290 K/UL (ref 135–450)
PMV BLD AUTO: 8.5 FL (ref 5–10.5)
POTASSIUM SERPL-SCNC: 4.4 MMOL/L (ref 3.5–5.1)
RBC # BLD AUTO: 4.07 M/UL (ref 4–5.2)
SODIUM SERPL-SCNC: 142 MMOL/L (ref 136–145)
WBC # BLD AUTO: 7.4 K/UL (ref 4–11)

## 2025-01-08 PROCEDURE — 97535 SELF CARE MNGMENT TRAINING: CPT

## 2025-01-08 PROCEDURE — 97530 THERAPEUTIC ACTIVITIES: CPT

## 2025-01-08 PROCEDURE — 85025 COMPLETE CBC W/AUTO DIFF WBC: CPT

## 2025-01-08 PROCEDURE — 36415 COLL VENOUS BLD VENIPUNCTURE: CPT

## 2025-01-08 PROCEDURE — 80048 BASIC METABOLIC PNL TOTAL CA: CPT

## 2025-01-08 PROCEDURE — 1280000000 HC REHAB R&B

## 2025-01-08 PROCEDURE — 97129 THER IVNTJ 1ST 15 MIN: CPT

## 2025-01-08 PROCEDURE — 6370000000 HC RX 637 (ALT 250 FOR IP): Performed by: STUDENT IN AN ORGANIZED HEALTH CARE EDUCATION/TRAINING PROGRAM

## 2025-01-08 PROCEDURE — 97110 THERAPEUTIC EXERCISES: CPT

## 2025-01-08 PROCEDURE — 6360000002 HC RX W HCPCS: Performed by: STUDENT IN AN ORGANIZED HEALTH CARE EDUCATION/TRAINING PROGRAM

## 2025-01-08 PROCEDURE — 97116 GAIT TRAINING THERAPY: CPT

## 2025-01-08 PROCEDURE — 97112 NEUROMUSCULAR REEDUCATION: CPT

## 2025-01-08 PROCEDURE — 97130 THER IVNTJ EA ADDL 15 MIN: CPT

## 2025-01-08 RX ADMIN — METOPROLOL TARTRATE 25 MG: 25 TABLET, FILM COATED ORAL at 07:58

## 2025-01-08 RX ADMIN — CETIRIZINE HYDROCHLORIDE 5 MG: 5 TABLET ORAL at 07:57

## 2025-01-08 RX ADMIN — GABAPENTIN 200 MG: 100 CAPSULE ORAL at 07:58

## 2025-01-08 RX ADMIN — LISINOPRIL 10 MG: 10 TABLET ORAL at 19:31

## 2025-01-08 RX ADMIN — FLUTICASONE PROPIONATE 1 SPRAY: 50 SPRAY, METERED NASAL at 21:46

## 2025-01-08 RX ADMIN — HEPARIN SODIUM 5000 UNITS: 5000 INJECTION INTRAVENOUS; SUBCUTANEOUS at 15:07

## 2025-01-08 RX ADMIN — ATORVASTATIN CALCIUM 10 MG: 10 TABLET, FILM COATED ORAL at 07:58

## 2025-01-08 RX ADMIN — HEPARIN SODIUM 5000 UNITS: 5000 INJECTION INTRAVENOUS; SUBCUTANEOUS at 21:46

## 2025-01-08 RX ADMIN — METOPROLOL TARTRATE 25 MG: 25 TABLET, FILM COATED ORAL at 19:31

## 2025-01-08 RX ADMIN — OXYCODONE HYDROCHLORIDE AND ACETAMINOPHEN 500 MG: 500 TABLET ORAL at 07:58

## 2025-01-08 RX ADMIN — DICLOFENAC SODIUM TOPICAL GEL, 1% 4 G: 10 GEL TOPICAL at 21:45

## 2025-01-08 RX ADMIN — FIDAXOMICIN 200 MG: 200 TABLET, FILM COATED ORAL at 21:33

## 2025-01-08 RX ADMIN — Medication 1 TABLET: at 07:57

## 2025-01-08 RX ADMIN — Medication 400 MG: at 07:58

## 2025-01-08 RX ADMIN — LISINOPRIL 10 MG: 10 TABLET ORAL at 07:57

## 2025-01-08 RX ADMIN — PANTOPRAZOLE SODIUM 40 MG: 40 TABLET, DELAYED RELEASE ORAL at 08:12

## 2025-01-08 RX ADMIN — FIDAXOMICIN 200 MG: 200 TABLET, FILM COATED ORAL at 07:57

## 2025-01-08 RX ADMIN — LEVOTHYROXINE SODIUM 50 MCG: 0.05 TABLET ORAL at 08:12

## 2025-01-08 RX ADMIN — DICLOFENAC SODIUM TOPICAL GEL, 1% 4 G: 10 GEL TOPICAL at 08:03

## 2025-01-08 RX ADMIN — TRAZODONE HYDROCHLORIDE 50 MG: 50 TABLET ORAL at 19:30

## 2025-01-08 RX ADMIN — QUETIAPINE FUMARATE 25 MG: 25 TABLET ORAL at 19:31

## 2025-01-08 RX ADMIN — OXYCODONE HYDROCHLORIDE AND ACETAMINOPHEN 500 MG: 500 TABLET ORAL at 19:30

## 2025-01-08 RX ADMIN — OXYCODONE 5 MG: 5 TABLET ORAL at 19:31

## 2025-01-08 RX ADMIN — ASPIRIN 81 MG 81 MG: 81 TABLET ORAL at 07:57

## 2025-01-08 RX ADMIN — Medication 1 TABLET: at 19:31

## 2025-01-08 RX ADMIN — GABAPENTIN 200 MG: 100 CAPSULE ORAL at 15:07

## 2025-01-08 RX ADMIN — GABAPENTIN 200 MG: 100 CAPSULE ORAL at 19:31

## 2025-01-08 ASSESSMENT — PAIN DESCRIPTION - LOCATION
LOCATION: KNEE
LOCATION: BACK

## 2025-01-08 ASSESSMENT — PAIN DESCRIPTION - ONSET: ONSET: PROGRESSIVE

## 2025-01-08 ASSESSMENT — PAIN - FUNCTIONAL ASSESSMENT
PAIN_FUNCTIONAL_ASSESSMENT: ACTIVITIES ARE NOT PREVENTED
PAIN_FUNCTIONAL_ASSESSMENT: ACTIVITIES ARE NOT PREVENTED

## 2025-01-08 ASSESSMENT — PAIN DESCRIPTION - ORIENTATION
ORIENTATION: RIGHT;LEFT
ORIENTATION: MID

## 2025-01-08 ASSESSMENT — PAIN SCALES - GENERAL
PAINLEVEL_OUTOF10: 6
PAINLEVEL_OUTOF10: 8

## 2025-01-08 ASSESSMENT — PAIN DESCRIPTION - DESCRIPTORS
DESCRIPTORS: ACHING
DESCRIPTORS: ACHING

## 2025-01-08 ASSESSMENT — PAIN DESCRIPTION - FREQUENCY: FREQUENCY: INTERMITTENT

## 2025-01-08 NOTE — PLAN OF CARE
On the basis of positive falls risk screening, assessment and plan is as follows:   Instruction on call light usage  Fall wrist band and yellow socks  Table and commonly used items in arms reach  Purposeful and intentional rounding  Bed alarm/ chair alarm  AVASYS   Communication of fall risk to team members through charting and shift reports.      Monitor vitals and labs for concerns of possible contributing factors    Vitals:    01/07/25 0845 01/07/25 0945 01/07/25 1337 01/07/25 1942   BP: (!) 94/57  115/62 (!) 141/70   Pulse: 93  90 (!) 105   Resp: 18   16   Temp: 98.2 °F (36.8 °C)   98.7 °F (37.1 °C)   TempSrc: Oral   Oral   SpO2: 97%  98% 98%   Weight:  75.6 kg (166 lb 9.6 oz)     Height:

## 2025-01-08 NOTE — PROGRESS NOTES
repeatedly touching pot on stove to determine temperature. Pt will need assistance with IADLs at home for safety. Pt assisted to send text message to daughter at end of session, but required max A to type message and click send. Continue POC.  Activity Tolerance: Patient tolerated treatment well  Discharge Recommendations: 24 hour supervision or assist;Home with Home health OT;S Level 4  OT Equipment Recommendations  Equipment Needed: Yes  Mobility Devices: ADL Assistive Devices  ADL Assistive Devices: Reacher;Sock-Aid Hard;Long-handled Sponge  Safety Devices  Safety Devices in place: Yes  Type of devices: Patient at risk for falls;Gait belt;Call light within reach;Nurse notified;Left in chair;Chair alarm in place    Patient Education  Education  Education Given To: Patient  Education Provided: Role of Therapy;Plan of Care;Precautions;Safety;ADL Function;Transfer Training;Fall Prevention Strategies;Energy Conservation;Cognition;Mobility Training;Equipment;DME/Home Modifications;IADL Function  Education Provided Comments: hand placement for sit<>stands, ADL sequencing, visual scanning, balance, adaptive techniques for use of phone, IADL safety/sequencing  Education Method: Verbal;Demonstration;Teach Back  Barriers to Learning: Cognition  Education Outcome: Continued education needed;Verbalized understanding    Plan  Occupational Therapy Plan  Times Per Week: 5-7x/wk  Current Treatment Recommendations: Strengthening;ROM;Balance training;Functional mobility training;Endurance training;Patient/Caregiver education & training;Safety education & training;Neuromuscular re-education;Equipment evaluation, education, & procurement;Self-Care / ADL;Home management training;Coordination training;Gait training;Cognitive reorientation;Pain management;Positioning;Cognitive/Perceptual training    Goals  Short Term Goals  Time Frame for Short Term Goals: 1/1/25- ongoing 1/08  Short Term Goal 1: Pt will perform toilet transfer with min

## 2025-01-08 NOTE — PROGRESS NOTES
Physical Therapy  Facility/Department: [facility]  Rehabilitation Physical Therapy Treatment Note    NAME: Fabiana Ceballos  : 1945 (79 y.o.)  MRN: [id number]  CODE STATUS: Full Code    Date of Service: 25       Restrictions:  Restrictions/Precautions: Modified Diet, Fall Risk, Contact Precautions, General Precautions, ROM Restrictions  Required Braces or Orthoses  Spinal: Thoracic Lumbar Sacral Orthotics  Spinal Other: when OOB, may don at EOB  Position Activity Restriction  Spinal Precautions: No Lifting, No Twisting, No Bending  Spinal Precautions Comments: Per pt/family pt with spinal px and to wear TLSO until mid february  Other Position/Activity Restrictions: TLSO, soft and bite sized, recent back surgery 2024, L scalp incision, contact-Cdiff, spinal px     SUBJECTIVE  Subjective: pt found in bed, RN providing am medications  Pain: no pain indicated       OBJECTIVE  Cognition  Overall Cognitive Status: Exceptions  Arousal/Alertness: Delayed responses to stimuli  Following Commands: Follows one step commands with increased time  Attention Span: Attends with cues to redirect;Difficulty attending to directions;Difficulty dividing attention  Memory: Impaired;Decreased short term memory;Decreased recall of recent events;Decreased recall of precautions  Safety Judgement: Decreased awareness of need for assistance;Decreased awareness of need for safety  Problem Solving: Assistance required to correct errors made;Assistance required to identify errors made;Assistance required to implement solutions;Assistance required to generate solutions  Insights: Decreased awareness of deficits  Initiation: Requires cues for some  Sequencing: Requires cues for some  Cognition Comment: Patient requiring increased time for cognitive processing, improved processing speed as session progressed  Orientation  Overall Orientation Status: Impaired  Orientation Level: Oriented to time;Oriented to situation;Disoriented to

## 2025-01-08 NOTE — PROGRESS NOTES
01/08/25 1451   Encounter Summary   Encounter Overview/Reason Spiritual/Emotional Needs   Service Provided For Patient and family together   Referral/Consult From Rounding;Other    Support System Children;Family members;[place]ism/rakel community   Last Encounter  01/08/25  ( provided prayer, active,empathetic listening, prayer card, scripture note, and Spiritual Health contact card/KM)   Complexity of Encounter Low   Begin Time 1435   End Time  1454   Total Time Calculated 19 min   Crisis   Type Follow up   Spiritual/Emotional needs   Type Emotional Distress;Spiritual Distress;Spiritual Support   Grief, Loss, and Adjustments   Type Adjustment to illness;Life Adjustments   Assessment/Intervention/Outcome   Assessment Calm;Coping;Hopeful;Peaceful   Intervention Active listening;Discussed belief system/Latter-day practices/rakel;Discussed illness injury and it’s impact;Explored/Affirmed feelings, thoughts, concerns;Explored Coping Skills/Resources;Prayer (assurance of)/[place];Sustaining Presence/Ministry of presence;Read/Provided Scripture   Outcome Comfort;Connection/Belonging;Coping;Encouraged;Engaged in conversation;Expressed Gratitude      Cristiana Muse EdD, ALFRED GARCIA ([facility])    Thank you for consulting Spiritual Health    If you would like a 's presence for emotional, spiritual, grief or comfort care,   please dial \"[phone]\" and ask for the  on-call to be paged.    For help with Advanced Care Planning, Power of  for Healthcare or Living Will forms, you may also call us directly:    [phone]) Gm  [phone]) Ashley  [phone]) Outpatient    [facility]

## 2025-01-08 NOTE — PROGRESS NOTES
HOB elevated  Transfers:  Surface: From bed, To chair with arms  Additional Factors: Hand placement cues, Increased time to complete, Verbal cues  Device: Walker (RW)  Sit>stand:  Assistance Level: Contact guard assist, Stand by assist  Skilled Clinical Factors: from EOB and commode to grab bar  Stand>sit:  Assistance Level: Contact guard assist, Stand by assist  Bed<>chair  Technique: Stand pivot  Assistance Level: Stand by assist, Contact guard assist  Skilled Clinical Factors: from w/c <> EOB  Stand Pivot:  Assistance Level: Minimal assistance  Skilled Clinical Factors: EOB to w/c adn w/c <> commode  Lateral transfer:     Car transfer:     Ambulation:  Surface: Level surface  Device: Rolling walker  Distance: 15 ft x 2 reps + 75 ft  Activity: Within Room, Within Unit  Activity Comments: 2nd person wheelchair follow on 2nd gait d/t fatigue  Additional Factors: Verbal cues, Increased time to complete  Assistance Level: Contact guard assist  Gait Deviations: Slow david, Decreased step length bilateral, Unsteady gait  Skilled Clinical Factors: Forward flexed, VC for proximity to walker, distracted in hallways, narrow ELIDA during turns  Stairs:  Stair Height: 6''  Device: Bilateral handrails  Number of Stairs: 4  Assistance Level: Contact guard assist  Skilled Clinical Factors: step to pattern, slow speed  Curb:     Wheelchair:       Occupational therapy:   Feeding  Assistance Level: Set-up  Skilled Clinical Factors: Pt given a second built up handle for assist with holding utensils and re-educated on use of built up  to assist with eating. RN updated as well to reinforce use.  Grooming/Oral Hygiene  Assistance Level: Supervision  Skilled Clinical Factors: standing at sink for 3-4 minutes to wash hands and brush teeth  UE Bathing  Assistance Level: Moderate assistance  Skilled Clinical Factors: [place] and max VCs for sequencing/progressing through task  LE Bathing  Equipment Provided: Long-handled

## 2025-01-08 NOTE — PATIENT CARE CONFERENCE
training and carryover, recall/STM, problem solving, reasoning, exec function, thought organization, attention.     Short Term Goals:  Timeframe for Short Term Goals: extend goals through 01/09/25   Goal 1: The patient will tolerate recommended diet with no clinical s/s of aspiration 5/5 - 01/08 - ongoing, slowly progressing   Goal 2: The patient/caregiver will demonstrate understanding of compensatory swallow strategies, for improved swallow safety - 01/08 - ongoing, slowly progressing      Long Term Goals:   Timeframe for Long Term Goals: extend goals through 01/13/25   Goal 1: The patient will tolerate least restrictive diet with no clinical s/s of aspiration or worsening respiratory/pulmonary status - 01/08 - ongoing, slowly progressing      Speech/Language/Cog Goals: goals established this date   Long Term Goals:   Time Frame for Long Term Goals: extend goals through 01/13/25   Goal 1: Pt will improve cognitive-linguistic abilities to promote safe and independent return home PLOF  - 01/08 - ongoing, limited progress     Short Term Goals:  Time Frame for Short Term Goals: extend goals through 01/09/25   Goal 1: Pt will  complete graded recall  tasks using compensatory strategies with 80% acc given min cues  - 01/08 - ongoing, limited progress  Goal 2: Pt will complete problem solving and thought organization tasks with 80% acc given min cues   - 01/08 - ongoing, limited progress  Goal 3: Pt will complete ongoing cognitive assessment and goals / POC will be updated as indicated - goal met  12/26  New goal added 12/26/24:  Goal 4: Pt will complete executive function tasks (e.g.meds, time, money, etc) with 80% acc given min cues - 01/08 - ongoing, limited progress    ST Assessment:  Pt required MAX cues for verbal problem solving and verbal sequencing tasks, including f02. Significant word finding deficits observed during tasks, suspect 2/2 severely impaired thought organization. Pt will likely benefit from edu re:

## 2025-01-08 NOTE — PLAN OF CARE
Problem: Pain  Goal: Verbalizes/displays adequate comfort level or baseline comfort level  Outcome: Progressing     Problem: Safety - Adult  Goal: Free from fall injury  1/8/2025 1120 by Shanelle Dong RN  Outcome: Progressing

## 2025-01-09 VITALS
SYSTOLIC BLOOD PRESSURE: 109 MMHG | WEIGHT: 166.6 LBS | TEMPERATURE: 97.7 F | HEIGHT: 64 IN | RESPIRATION RATE: 16 BRPM | OXYGEN SATURATION: 95 % | DIASTOLIC BLOOD PRESSURE: 68 MMHG | BODY MASS INDEX: 28.44 KG/M2 | HEART RATE: 115 BPM

## 2025-01-09 LAB
ANION GAP SERPL CALCULATED.3IONS-SCNC: 10 MMOL/L (ref 3–16)
BASOPHILS # BLD: 0 K/UL (ref 0–0.2)
BASOPHILS NFR BLD: 0.3 %
BUN SERPL-MCNC: 16 MG/DL (ref 7–20)
CALCIUM SERPL-MCNC: 9.8 MG/DL (ref 8.3–10.6)
CHLORIDE SERPL-SCNC: 104 MMOL/L (ref 99–110)
CO2 SERPL-SCNC: 28 MMOL/L (ref 21–32)
CREAT SERPL-MCNC: 0.7 MG/DL (ref 0.6–1.2)
DEPRECATED RDW RBC AUTO: 19.5 % (ref 12.4–15.4)
EOSINOPHIL # BLD: 0.2 K/UL (ref 0–0.6)
EOSINOPHIL NFR BLD: 3.4 %
GFR SERPLBLD CREATININE-BSD FMLA CKD-EPI: 87 ML/MIN/{1.73_M2}
GLUCOSE SERPL-MCNC: 134 MG/DL (ref 70–99)
HCT VFR BLD AUTO: 36.2 % (ref 36–48)
HGB BLD-MCNC: 11.9 G/DL (ref 12–16)
LYMPHOCYTES # BLD: 1.9 K/UL (ref 1–5.1)
LYMPHOCYTES NFR BLD: 27.2 %
MCH RBC QN AUTO: 27.9 PG (ref 26–34)
MCHC RBC AUTO-ENTMCNC: 32.9 G/DL (ref 31–36)
MCV RBC AUTO: 85 FL (ref 80–100)
MONOCYTES # BLD: 0.5 K/UL (ref 0–1.3)
MONOCYTES NFR BLD: 7 %
NEUTROPHILS # BLD: 4.3 K/UL (ref 1.7–7.7)
NEUTROPHILS NFR BLD: 62.1 %
PLATELET # BLD AUTO: 318 K/UL (ref 135–450)
PMV BLD AUTO: 7.6 FL (ref 5–10.5)
POTASSIUM SERPL-SCNC: 4.2 MMOL/L (ref 3.5–5.1)
RBC # BLD AUTO: 4.26 M/UL (ref 4–5.2)
SODIUM SERPL-SCNC: 142 MMOL/L (ref 136–145)
WBC # BLD AUTO: 7 K/UL (ref 4–11)

## 2025-01-09 PROCEDURE — 97130 THER IVNTJ EA ADDL 15 MIN: CPT

## 2025-01-09 PROCEDURE — 85025 COMPLETE CBC W/AUTO DIFF WBC: CPT

## 2025-01-09 PROCEDURE — 36415 COLL VENOUS BLD VENIPUNCTURE: CPT

## 2025-01-09 PROCEDURE — 92526 ORAL FUNCTION THERAPY: CPT

## 2025-01-09 PROCEDURE — 97530 THERAPEUTIC ACTIVITIES: CPT

## 2025-01-09 PROCEDURE — 97110 THERAPEUTIC EXERCISES: CPT

## 2025-01-09 PROCEDURE — 6370000000 HC RX 637 (ALT 250 FOR IP): Performed by: STUDENT IN AN ORGANIZED HEALTH CARE EDUCATION/TRAINING PROGRAM

## 2025-01-09 PROCEDURE — 97129 THER IVNTJ 1ST 15 MIN: CPT

## 2025-01-09 PROCEDURE — 97535 SELF CARE MNGMENT TRAINING: CPT

## 2025-01-09 PROCEDURE — 6360000002 HC RX W HCPCS: Performed by: STUDENT IN AN ORGANIZED HEALTH CARE EDUCATION/TRAINING PROGRAM

## 2025-01-09 PROCEDURE — 80048 BASIC METABOLIC PNL TOTAL CA: CPT

## 2025-01-09 RX ORDER — ASCORBIC ACID 500 MG
500 TABLET ORAL 2 TIMES DAILY
Qty: 30 TABLET | Refills: 0 | Status: SHIPPED | OUTPATIENT
Start: 2025-01-09

## 2025-01-09 RX ORDER — QUETIAPINE FUMARATE 25 MG/1
25 TABLET, FILM COATED ORAL NIGHTLY PRN
Qty: 7 TABLET | Refills: 0 | Status: SHIPPED | OUTPATIENT
Start: 2025-01-09

## 2025-01-09 RX ORDER — GABAPENTIN 100 MG/1
200 CAPSULE ORAL EVERY 6 HOURS
Qty: 240 CAPSULE | Refills: 0 | Status: SHIPPED | OUTPATIENT
Start: 2025-01-09 | End: 2025-02-08

## 2025-01-09 RX ORDER — ASPIRIN 81 MG/1
81 TABLET ORAL DAILY
Qty: 30 TABLET | Refills: 0 | Status: SHIPPED | OUTPATIENT
Start: 2025-01-09 | End: 2025-02-08

## 2025-01-09 RX ORDER — LANOLIN ALCOHOL/MO/W.PET/CERES
400 CREAM (GRAM) TOPICAL DAILY
Qty: 30 TABLET | Refills: 0 | Status: SHIPPED | OUTPATIENT
Start: 2025-01-10

## 2025-01-09 RX ORDER — ATORVASTATIN CALCIUM 10 MG/1
10 TABLET, FILM COATED ORAL DAILY
Qty: 30 TABLET | Refills: 0 | Status: SHIPPED | OUTPATIENT
Start: 2025-01-09

## 2025-01-09 RX ORDER — LISINOPRIL 5 MG/1
5 TABLET ORAL 2 TIMES DAILY
Status: DISCONTINUED | OUTPATIENT
Start: 2025-01-09 | End: 2025-01-09 | Stop reason: HOSPADM

## 2025-01-09 RX ORDER — METOPROLOL TARTRATE 25 MG/1
12.5 TABLET, FILM COATED ORAL 2 TIMES DAILY
Qty: 60 TABLET | Refills: 0 | Status: SHIPPED | OUTPATIENT
Start: 2025-01-09

## 2025-01-09 RX ADMIN — GABAPENTIN 200 MG: 100 CAPSULE ORAL at 14:35

## 2025-01-09 RX ADMIN — GABAPENTIN 200 MG: 100 CAPSULE ORAL at 03:19

## 2025-01-09 RX ADMIN — LEVOTHYROXINE SODIUM 50 MCG: 0.05 TABLET ORAL at 07:00

## 2025-01-09 RX ADMIN — ASPIRIN 81 MG 81 MG: 81 TABLET ORAL at 08:02

## 2025-01-09 RX ADMIN — PANTOPRAZOLE SODIUM 40 MG: 40 TABLET, DELAYED RELEASE ORAL at 07:00

## 2025-01-09 RX ADMIN — Medication 400 MG: at 08:02

## 2025-01-09 RX ADMIN — Medication 1 TABLET: at 08:02

## 2025-01-09 RX ADMIN — ATORVASTATIN CALCIUM 10 MG: 10 TABLET, FILM COATED ORAL at 08:02

## 2025-01-09 RX ADMIN — CETIRIZINE HYDROCHLORIDE 5 MG: 5 TABLET ORAL at 08:02

## 2025-01-09 RX ADMIN — DICLOFENAC SODIUM TOPICAL GEL, 1% 4 G: 10 GEL TOPICAL at 14:38

## 2025-01-09 RX ADMIN — OXYCODONE HYDROCHLORIDE AND ACETAMINOPHEN 500 MG: 500 TABLET ORAL at 08:02

## 2025-01-09 RX ADMIN — HEPARIN SODIUM 5000 UNITS: 5000 INJECTION INTRAVENOUS; SUBCUTANEOUS at 06:59

## 2025-01-09 RX ADMIN — DICLOFENAC SODIUM TOPICAL GEL, 1% 4 G: 10 GEL TOPICAL at 08:07

## 2025-01-09 RX ADMIN — GABAPENTIN 200 MG: 100 CAPSULE ORAL at 08:02

## 2025-01-09 RX ADMIN — HEPARIN SODIUM 5000 UNITS: 5000 INJECTION INTRAVENOUS; SUBCUTANEOUS at 14:35

## 2025-01-09 ASSESSMENT — PAIN SCALES - GENERAL: PAINLEVEL_OUTOF10: 0

## 2025-01-09 NOTE — PROGRESS NOTES
IRF LAST Discharge Assessment  [facility] Acute Rehab Unit    Patient:Fabiana Ceballos     Rehab Dx/Hx: SDH (subdural hematoma) [S06.5XAA]   :1945  MRN:[id number]  Date of Admit: 2024     Pain Effect on Sleep:  Over the past 5 days, how much of the time has pain made it hard for you to sleep at night?  []  0. Does not apply - I have not had any pain or hurting in the past 5 days  [x]  1. Rarely or not at all  []  2. Occasionally  []  3. Frequently  []  4. Almost constantly  []  8. Unable to answer    Over the past 5 days, how often have you limited your participation in rehabilitation therapy sessions due to pain?  []  0. Does not apply - I have not received rehabilitation therapy in the past 5 days  [x]  1. Rarely or not at all  []  2. Occasionally  []  3. Frequently  []  4. Almost constantly  []  8. Unable to answer    Pain Interference with Day-to-Day Activities:  Over the past 5 days, how often have you limited your day-to-day activities (excluding rehabilitation therapy session)?  [x]  1. Rarely or not at all  []  2. Occasionally  []  3. Frequently  []  4. Almost constantly  []  8. Unable to answer      High-Risk Drug Classes: Use and Indication   Is taking: Check if the pt is taking any medications by pharmacological classification  Indication noted: If column 1 is checked, check if there is an indication noted for all meds in the drug class Is taking  (check all that apply) Indication noted (check all that apply)   Antipsychotic [x] []   Anticoagulant [x] []   Antibiotic [] []   Opioid [x] []   Antiplatelet [] []   Hypoglycemic (including insulin) [] []   None of the above [] []     Special Treatments, Procedures, and Programs   Check all of the following treatments, procedures, and programs that apply on discharge.  On discharge (check all that apply)   Cancer Treatments    A1. Chemotherapy []   A2. IV []   A3. Oral []   A10. Other []   B1. Radiation []   Respiratory Therapies    C1. Oxygen

## 2025-01-09 NOTE — PROGRESS NOTES
Occupational Therapy  Discharge Summary     Name:Fabiana Ceballos  MRN:[id number]  :1945  Treatment Diagnosis: Impaired balance and gait  Diagnosis: Fall, SDH s/p craniotomy with evacuation    Restrictions/Precautions  Restrictions/Precautions: Modified Diet, Fall Risk, Contact Precautions, General Precautions, ROM Restrictions  Activity Level: Up as Tolerated  Required Braces or Orthoses?: Yes           Position Activity Restriction  Spinal Precautions: No Lifting, No Twisting, No Bending  Spinal Precautions Comments: Per pt/family pt with spinal px and to wear TLSO until mid february  Other Position/Activity Restrictions: TLSO, soft and bite sized, recent back surgery 2024, L scalp incision, contact-Cdiff, spinal px     Goals:   Short Term Goals  Time Frame for Short Term Goals: 25  Short Term Goal 1: Pt will perform toilet transfer with min A. GOAL MET 24 Pt performed toilet transfer with min A.  Short Term Goal 2: Pt will perform toileting task with mod A. GOAL MET 24 Pt performed toileting task with min A.  Short Term Goal 3: Pt will perform LB dressing with mod A. GOAL MET 25 Pt performed LB dressing with min A.  Short Term Goal 4: Pt will perform LB bathing with CGA and utilizing AE PRN. Goal Not Met Pt requires min A for LB bathing.  Short Term Goal 5: Pt will perform 5 x 20 reps BUE ex to incr strength and activity tolerance for ADLs and fxl transfers. GOAL MET 24 Pt performed 5x20 reps of BUE ther ex.  Long Term Goals  Time Frame for Long Term Goals : 25- extend to 1/10 d/t inc LOS  Long Term Goal 1: Pt will perform toilet transfer mod I. Goal Not Met. Pt requires CGA for toilet transfer.  Long Term Goal 2: Pt will perform TB dressing with mod I. Goal Not Met. Pt requires min A for TB dressing.  Long Term Goal 3: Pt will perform TB bathing with mod I. Goal Not Met. Pt requires min A for TB bathing  Long Term Goal 4: Pt will perform simple IADL task with mod I. Goal Not

## 2025-01-09 NOTE — PROGRESS NOTES
Pt requires min A for LB bathing.  Short Term Goal 5: Pt will perform 5 x 20 reps BUE ex to incr strength and activity tolerance for ADLs and fxl transfers. GOAL MET 1/6/24 Pt performed 5x20 reps of BUE ther ex.  Long Term Goals  Time Frame for Long Term Goals : 1/06/25- extend to 1/10 d/t inc LOS  Long Term Goal 1: Pt will perform toilet transfer mod I. Goal Not Met. Pt requires CGA for toilet transfer.  Long Term Goal 2: Pt will perform TB dressing with mod I. Goal Not Met. Pt requires min A for TB dressing.  Long Term Goal 3: Pt will perform TB bathing with mod I. Goal Not Met. Pt requires min A for TB bathing  Long Term Goal 4: Pt will perform simple IADL task with mod I. Goal Not Met. Pt requires SPV/SBA for simple dish washing and up to mod A for simple meal prep.    Therapy Time   Individual Concurrent Group Co-treatment   Time In 1300         Time Out 1425         Minutes 85         Timed Code Treatment Minutes: 85 Minutes       Nam Pugh, OT

## 2025-01-09 NOTE — PROGRESS NOTES
[facility]: ACUTE REHAB UNIT  SPEECH-LANGUAGE PATHOLOGY      [x] Daily  [] Weekly Care Conference Note  [x] Discharge    Patient:Fabiana Ceballos      :1945  MRN:[id number]  Rehab Dx/Hx: SDH (subdural hematoma) [S06.5XAA]    Precautions: falls, contact   Home situation: lives alone. Indep with med management, finances, and all household tasks.  ST Dx: [] Aphasia  [] Dysarthria  [] Apraxia   [x] Oropharyngeal dysphagia [x] Cognitive Impairment  [] Other:   Date of Admit: 2024  Room #: 0155/0155-01    Current functional status (updated daily):         Pt being seen for : [] Speech/Language Treatment  [x] Dysphagia Treatment [x] Cognitive Treatment  [] Other:  Communication: [x]WFL  [] Aphasia  [] Dysarthria  [] Apraxia  [] Pragmatic Impairment [] Non-verbal  [] Hearing Loss  [] Other:   Cognition: [] WFL  [] Mild  [x] Moderate  [x] Severe [] Unable to Assess  [] Other:  Memory: [] WFL  [] Mild  [x] Moderate  [x] Severe [] Unable to Assess  [] Other:  Behavior: [] Alert  [x] Cooperative  [x]  Pleasant  [] Confused  [] Agitated  [] Uncooperative  [] Distractible [] Motivated  [] Self-Limiting [] Anxious  [] Other:  Endurance:  [] Adequate for participation in SLP sessions  [x] Reduced overall  [x] Lethargic  [] Other:  Safety: [] No concerns at this time  [x] Reduced insight into deficits  [x]  Reduced safety awareness [] Not following call light procedures  [] Unable to Assess  [] Other:    Current Diet Order:ADULT DIET; Dysphagia - Soft and Bite Sized  ADULT ORAL NUTRITION SUPPLEMENT; Dinner; Standard High Calorie/High Protein Oral Supplement  Swallowing Precautions: upright for all intake, stay upright for at least 30 mins after intake, small bites/sips, close supervision, oral care 2-3x/day to reduce adverse affects in the event of aspiration, increase physical mobility as able, alternate bites/sips, slow rate of intake, general GERD precautions, general aspiration precautions, and hold PO and contact SLP if

## 2025-01-09 NOTE — PROGRESS NOTES
Comprehensive Nutrition Assessment    Type and Reason for Visit:  Reassess    Nutrition Recommendations/Plan:   Continue soft and bite sized solids w/ thin liquids (textures and consistencies per SLP recommendations).   Continue Ensure once/day (prefers strawberry but will accept vanilla).   Encourage PO intake as tolerated.   Monitor nutrition adequacy, pertinent labs, bowel habits, wt changes, and clinical progress      Malnutrition Assessment:  Malnutrition Status:  At risk for malnutrition (12/26/24 1253)    Context:  Acute Illness     Findings of the 6 clinical characteristics of malnutrition:  Energy Intake:  Mild decrease in energy intake  Weight Loss:  Unable to assess (limited recent hx and unspecified/\"stated\" collection methods)     Body Fat Loss:  Mild body fat loss (question natural aging versus malnutrition) Orbital   Muscle Mass Loss:  Mild muscle mass loss Temples (temporalis) (question natural aging versus malnutrition)  Fluid Accumulation:  No fluid accumulation     Strength:  Not Performed    Nutrition Assessment:    Follow-up. Pt continues to nutritionally improve AEB PO intakes of mainly % of meals. She remains on soft and bite sized diet w/ thin liquids per SLP recs. Wts trending up this admission in EMR. Will continue Ensure once/day to better meet estimated nutrition needs. Per RN note, pt will likely d/c today. Will continue to monitor.    Nutrition Related Findings:    Last BM documented 1/08. Active BS. Cr: 0.5. + C-diff. + vitamin C and multivitamin. Wound Type: Surgical Incision       Current Nutrition Intake & Therapies:    Average Meal Intake: 26-50%, % (mainly %)  Average Supplements Intake: %  ADULT DIET; Dysphagia - Soft and Bite Sized  ADULT ORAL NUTRITION SUPPLEMENT; Dinner; Standard High Calorie/High Protein Oral Supplement    Anthropometric Measures:  Height: 162.6 cm (5' 4.02\")  Ideal Body Weight (IBW): 120 lbs (55 kg)    Admission Body Weight: 74.4

## 2025-01-09 NOTE — PROGRESS NOTES
Physical Therapy  Discharge Summary    Name:Fabiana Ceballos  MRN:[id number]  :1945  Treatment Diagnosis: Impaired balance and gait  Diagnosis: Fall, SDH s/p craniotomy with evacuation    Restrictions/Precautions  Restrictions/Precautions: Modified Diet, Fall Risk, Contact Precautions, General Precautions, ROM Restrictions  Activity Level: Up as Tolerated  Required Braces or Orthoses?: Yes           Position Activity Restriction  Spinal Precautions: No Lifting, No Twisting, No Bending  Spinal Precautions Comments: Per pt/family pt with spinal px and to wear TLSO until mid february  Other Position/Activity Restrictions: TLSO, soft and bite sized, recent back surgery 2024, L scalp incision, contact-Cdiff, spinal px     Goals:                  Short Term Goals  Time Frame for Short Term Goals: 1 week 2024  Short Term Goal 1: Pt will complete bed mobility with CGA- GOAL MET  Short Term Goal 2: Pt will complete functional t/f with LRAD with SBA- GOA LNOT MET< CGA  Short Term Goal 3: Pt will ambulate >50' with LRAD wtih SBA without LOB- GOAL NOT MET, CGA  Short Term Goal 4: Pt will ascend/descend curb step with LRAD with CGA- GOAL NOT MET< NT 2* fatigue  Short Term Goal 5: Pt will tolerate formal balance assessment and appropriate goal to be set -  Not met d/t cognition, arousal levels            Long Term Goals  Time Frame for Long Term Goals : 14 days 2025-extended 1/10 per team  Long Term Goal 1: Pt will complete bed mobility with [place]- GOAL NOT MET< requries CGA  Long Term Goal 2: Pt will complete STS with LRAD with [place]- GOAL NOT MET< CGA  Long Term Goal 3: Pt will complete functional t/f with LRAD with Sup- GOAL NOT MET< CGA  Long Term Goal 4: Pt will ambulate >150' with LRAD with SUP without LOB- GOAL NOT MET 75-80 ft with RW  Long Term Goal 5: Pt will ascend/descend curb step with LRAD with SUP- GOAL NOT MET, CGA    Pt. Met 1/5 short term goals and 0/5 long term goals.     Pt. Currently

## 2025-01-09 NOTE — CARE COORDINATION
CM update: Attempt made to speak with patient in room. Patient currently resting. Will attempt to see patient at a later time.    Rajat Rivera RN    
CM update: Patient currently resting. Called and spoke with mars Brown over the phone to discuss discharge planning. Informed daughter of discharge planned for 1/10/25. Daughter Stephanie aware of recommendation for HHC after discharge and notified that [facility] has agreed to resume care. Mars Brown is aware that DME recs are TBD and that family training is recommended. Mars Brown agrees to schedule closer to discharge week if still recommended. Mars Brown has no further questions/concerns related to discharge at this time. Will continue to follow.    Rajat Rivera RN    
CM update: Patient was active with [facility] prior to arrival. Spoke with Alecia from [facility] and they have accepted referral to resume HHC for patient when discharged home.    Rajat Rivera RN    
CM update: Spoke with daughter Stephanie over the phone to discuss discharge planning for tomorrow 1/10. Daughter Stephanie informed that she would not be able to pick patient up until later because her car will be worked on . Discussed with the team and Dr. Colindres who is agreeable for patient to discharge home today after therapy as an option. Called daughter back and offered discharge home today which she accepted. Daughter understands that [facility] will be providing HHC for patient once home and that no DME will be provided at discharge since she has confirmed that patient already owns all recommended DME. Daughter has no further questions/concerns. I do not anticipate any further needs from case management.    Rajat Rivera, RN    
CM update: Spoke with daughter Stephanie over the phone to discuss discharge planning. Daughter verbalizes understanding of discharge planned for 1/10. She confirms she will be staying with patient at discharge to provide supervision/support. Daughter states they already own the recommended RW and W/C and understand that no DME will be provided at discharge. Daughter understands that [facility] will be providing HHC once patient is home. Reinforced and strongly encouraged family training- daughter states that she has participated in some therapy sessions already and does not feel she needs to schedule/attend. Daughter states she will let us know if she changes her mind. Daughter asks about speaking with Dr. Colindres regarding medical and follow-up appointments- Dr. Colindres made aware. No further questions/concerns r/t discharge planning. Will continue to follow.    Rajat Rivera RN    
CM update: Spoke with daughter Stpehanie in room to evaluate any questions/concerns she may have. Patient currently resting. Daughter reports that patient is currently being treated for a UTI. She verbalizes understanding that [facility] is discharged home. Daughter has no questions/concerns at this time related to discharge planning. I assured daughter I would keep her updated with any changes. Will continue to follow.    Rajat Rivera RN    
CM update: spoke with patient in room to discuss discharge planning. Patient made aware that discharge is planned for Friday 1/10 with recommendation for HHC. Patient understands that HHC will be provided through [facility]. Reinforced to patient that I spoke with and updated her daughter Stephanie yesterday. Patient has no questions/concerns at this time. Will continue to follow.    Rajat Rivera RN    
Case Management ARU Admission Assessment   [facility]    Patient:Fabiana Ceballos     Rehab Dx/Hx: SDH (subdural hematoma) [S06.5XAA]   :1945  MRN:[id number]  Date of Admit: 2024  Room #: 0155/0155-01       Objective:  met with the patient to complete initial assessment and review the role of Case Management while on the ARU. Patient educated on team conferences. Discussed family training with the patient/family on how it is encouraged on the unit. Order for \"discharge planning\" has been addressed.          Family Present: Daughter Stephanie   Primary : Daughter Stephanie [phone]   Health Care Decision Maker:    Current PCP:  Daniel Bustos       Admit date:  2024   Insurance: [facility] Medicare   Precert required for SNF:  [] No       [x] Yes   3 Night stay required: [x] No       [] Yes   Admitting diagnosis: SDH (subdural hematoma) [S06.5XAA]       Current home situation: Lives alone on main floor of multi-level house w/ 0STE   DME at home: [x] Walker     [x] Cane       [] RTS        [x] BSC      [x] Shower Chair        [] O2       [] HHN     [] CPAP       [] BiPap      [x] Hospital Bed       [x] W/C        [] Other:    Medical concerns requiring training: Continue to assess   Medication concerns:  Require financial assistance with meds? [x] No       [] If yes, please explain:   [] Yes              Services prior to admission: Was receiving HHC through [facility]   Preference for HHC or OP Therapy: [x] Home health       [] Outpatient       [] No preference   Patient's goal(s): \"Become stronger and go home\"   Working or volunteering PTA? retired       Transportation needs: Not active  pta- daughter is primary transportation support for patient - no transportation barriers reported   Has lack of transportation kept you from medical appointments, meetings, work, or ADL's? [] Yes, it has kept me from medical appointments or from getting my meds  [] Yes, 
Clinicals faxed to insurance for .    Adarsh Chappell MPH, RRT  ARU Admissions Supervisor-[facility]  (C)[phone]  (F)[phone]   Electronically signed by Adarsh Cahppell on 1/6/2025 at 10:44 AM      
Weekly team care conference with interdisciplinary team on: 1/2/2025    Chart reviewed for length of stay. IP day # 9   Unit: ARU    Diagnosis and current status as per MD progress: SDH, +Cdiff, +ESBL- UTI  Consultants Following: Physical Medicine, Neurology, GI, plan to consult ID  Planned Discharge Date: 1/10/25  Durable medical equipment needed: RW, W/C  Discharge Plan: Home with daughter Stephanie. [facility] Chloe Rivera RN      
Weekly team care conference with interdisciplinary team on: 1/9/2025    Chart reviewed for length of stay. IP day # 16  Unit: ARU   Diagnosis and current status as per MD progress: SDH  Consultants Following: Physical Medicine  Planned Discharge Date: 1/9/2025  Durable medical equipment needed: none- pt has all recommended DME  Discharge Plan: Home with daughter to provide 24hr.  [facility].    Rajat Rivera RN      
Weekly team care conference with interdisciplinary team on: 12/26/24    Chart reviewed for length of stay. IP day # 2  Unit: ARU   Diagnosis and current status as per MD progress: SDH, Cdiff +  Consultants Following: Physical Medicine, GI  Planned Discharge Date: 1/10/25  Durable medical equipment needed: TBD  Discharge Plan: Home with support from daughter Arpit Valles [facility]    Rajat Rivera RN      
Always  [] Rarely                            [] Patient declines to respond  [] Sometimes                    [] Patient unable to respond  [] Often       Note: Discharging nurse to complete MICHAEL, reconcile AVS, and place final copy with patient's discharge packet. RN to ensure that written prescriptions for  Level II medications are sent with patient to the facility as per protocol.        No further needs from Case Management. [facility] notified of discharge home today.     Signature: Rajat Rivera RN

## 2025-01-09 NOTE — PROGRESS NOTES
Per Dr. Smith, cannot work in today. He will be out of the office until next week. If patient calls back, please assist in scheduling an appointment.  Nat Lizarraga CMA ([facility])     Pt discharged home with daughter. HHC through ACM set up via discharge coordinator. Pt's AVS printed, reviewed and all questions answered. MICHAEL printed and sent with pt for HHC. Pt with all belongings at time of discharge. Pt taken via wheelchair to front [place] where daughter's vehicle was waiting. Unremarkable discharge.

## 2025-01-09 NOTE — PROGRESS NOTES
Physical Therapy  Facility/Department: [facility]  Rehabilitation Physical Therapy Treatment Note    NAME: Fabiana Ceballos  : 1945 (79 y.o.)  MRN: [id number]  CODE STATUS: Full Code    Date of Service: 25       Restrictions:  Restrictions/Precautions: Modified Diet, Fall Risk, Contact Precautions, General Precautions, ROM Restrictions  Required Braces or Orthoses  Spinal: Thoracic Lumbar Sacral Orthotics  Spinal Other: when OOB, may don at EOB  Position Activity Restriction  Spinal Precautions: No Lifting, No Twisting, No Bending  Spinal Precautions Comments: Per pt/family pt with spinal px and to wear TLSO until mid february  Other Position/Activity Restrictions: TLSO, soft and bite sized, recent back surgery 2024, L scalp incision, contact-Cdiff, spinal px     SUBJECTIVE  Subjective: pt found sleeping deeply in bed, increased time to rouse pt  Pain: no pain indicated when asked throuhgout session       OBJECTIVE  Cognition  Overall Cognitive Status: Exceptions  Arousal/Alertness: Delayed responses to stimuli  Following Commands: Follows one step commands with increased time  Attention Span: Attends with cues to redirect;Difficulty attending to directions;Difficulty dividing attention  Memory: Impaired;Decreased short term memory;Decreased recall of recent events;Decreased recall of precautions  Safety Judgement: Decreased awareness of need for assistance;Decreased awareness of need for safety  Problem Solving: Assistance required to correct errors made;Assistance required to identify errors made;Assistance required to implement solutions;Assistance required to generate solutions  Insights: Decreased awareness of deficits  Initiation: Requires cues for some  Sequencing: Requires cues for some  Cognition Comment: Patient requiring increased time for cognitive processing, improved processing speed as session progressed  Orientation  Overall Orientation Status: Impaired  Orientation Level: Oriented to

## 2025-01-09 NOTE — DISCHARGE SUMMARY
Physical Medicine & Rehabilitation  Discharge Summary     Patient Identification:  Fabiana Ceballos  : 1945  Admit date: 2024  Discharge date:  25  Attending provider: Monica Colindres MD        Primary care provider: Daniel Bustos PA-C     Discharge Diagnoses:   Active Hospital Problems    Diagnosis     History of subdural hematoma [Z86.79]     Acute encephalopathy [G93.40]     History of stroke [Z86.73]     Pneumocephalus [G93.89]     SDH (subdural hematoma) [S06.5XAA]        History of Present Illness/Acute Hospital Course:  Fabiana Ceballos is an 79 year old female with a past medical history significant for CAD s/p PCI, prior DVT (after TKA), HTN, HLD, T5-sacral fusion (2024) who presented to [facility] on 24 from clinic after a fall with headache and AMS. She was found to have left SDH. On 24 she underwent left craniotomy. Course notable for AMS. Precedex was weaned off and she is on seroquel qhs. She was admitted to [facility] on 24 due to functional deficits below her baseline. Today she is seen with her daughter present. She denies focal weakness due to SDH. She reports history of right TKA and some residual weakness and numbness around her knee due to that. She has a history of neuropathy and report numbness on the bottom of her feet bilaterally. Her daughter notes that she has been having frequent soft stools recently. She lives alone in a multilevel house, but is able to stay on the main level. She has a ramp to enter, but does have one step up to her bathroom. She has a supportive family that live close by. She enjoys watching [employer].     Inpatient Rehabilitation Course:   Fabiana Ceballos is a 79 y.o. female admitted to inpatient rehabilitation on 2024 with SDH (subdural hematoma).  The patient participated in an aggressive multidisciplinary inpatient rehabilitation program involving 3 hours of therapy per day, at least 5 days per week. Discharging home with

## 2025-01-09 NOTE — PROGRESS NOTES
ACUTE REHAB UNIT: DISCHARGE  [facility]    Patient:Fabiana Ceballos     Rehab Dx/Hx: SDH (subdural hematoma) [S06.5XAA]   :1945  MRN:[id number]  Date of Admit: 2024   Date of Discharge: 2025    Subjective:   Order for patient discharge.  Reviewed discharge summary (AVS) with patient and daughter including medications, physical instructions (safety) and diet. Pt in stable condition.     Reconciled Medication List - Patient:   Medications were reviewed by RN at time of discharge with patient and/or family:  [x]  Via EMR   []  Via health information exchange  []  Verbal (e.g. in person, telephone, video conferencing)  [x]  Paper-based (e.g. fax, copies, printouts)   []  Other Methods (e.g. texting, email, CDs)    Reconciled Medication List - Subsequent provider:   [] No, current reconciled medication list not provided to the subsequent provider.  [x] Yes, current reconciled medication list provided to the subsequent provider.   [] Via EMR    [] Via health information exchange  [] Verbal (e.g. in person, telephone, video conferencing)  [x] Paper-based (e.g. fax, copies, printouts)   [] Other Methods (e.g. texting, email, CDs)    Discharge disposition:  Pt was discharged via:  [x]  Wheelchair  []  Stretcher  []  Safe ambulation and use of assistive device  []  Other:     Pt was discharged to:  [x]  Private car  []  Transportation service to next destination  []  Other:     Discharge destination:  [x]  Home  []  Skilled Nursing Facility  []  Long Term Care  []  Other:     Has lack of transportation kept you from medical appointments, meetings, work, or ADL's? [] Yes, it has kept me from medical appointments or from getting my meds  [] Yes, It has kept me from non-medical meetings, appointments, work, or getting things I need  [x] No  [] Pt unable to respond  [] Pt declines to respond     How often do you need to have someone help you when you read instructions, pamphlets, or other

## 2025-03-07 ENCOUNTER — TELEPHONE (OUTPATIENT)
Dept: PHARMACY | Facility: CLINIC | Age: 80
End: 2025-03-07

## 2025-03-07 NOTE — TELEPHONE ENCOUNTER
[facility] CLINICAL PHARMACY: ADHERENCE REVIEW  Identified care gap per [place]: fills at [facility]: ACE/ARB adherence      ASSESSMENT    ACE/ARB ADHERENCE    Insurance Records claims through 3/3/25 (Prior Year PDC = not reported; YTD PDC = FIRST FILL; Potential Fail Date: 25):   Atorvastatin 10mg last filled on 25 for 30 day supply. Next refill due: 25    Prescribed si tablet/capsule daily    Per Insurer Portal: last filled on 25 for 30 day supply.     Per [facility]: not contacted    BP Readings from Last 3 Encounters:   25 109/68   24 120/70   23 118/68     Estimated Creatinine Clearance: 63 mL/min (based on SCr of 0.7 mg/dL).  Lab Results   Component Value Date    CREATININE 0.7 2025     Lab Results   Component Value Date    K 4.2 2025       PLAN    The following are interventions that have been identified:   Patient OVERDUE refilling atorvastatin 10mg tab and active on home medication list.   Patient eligible for 100 day supply of atorvastatin 10mg tab    Attempting to reach patient to review.  Left message asking for return call.    Last Visit: none  Next Visit: none      Cat Mcnamara [facility]   [place] [facility]  [phone] Option 1     For Pharmacy Admin Tracking Only    Program: [employer]  CPA in place:  No  Gap Closed?: No   Time Spent (min): 5

## 2025-03-07 NOTE — TELEPHONE ENCOUNTER
Noted that during an adherence encounter on 3/7/25, patient has requested a 100-day refill on the following medications:  Atorvastatin 10 mg daily  Patient is eligible for a 100-day supply with [facility].    [facility] [address] [place] [address] -  [phone] - F [phone]  [address] [address] [address]  Phone: [phone] Fax: [phone]       Will fax prescription request to patient's outside provider:  Daniel Bustos PA-C  [facility] [place] [address]  Phone: [phone]  Fax: [phone]        Ann Longstreth, PharmD, [facility]  Population Health Pharmacist  [facility]  Department, toll free: [phone], option 1       For Pharmacy Admin Tracking Only  Program: [employer]  CPA in place:  No  Recommendation Provided To: Provider: 1 via Fax sent to office  Intervention Detail: Refill(s) Provided  Intervention Accepted By: Provider: 0  Gap Closed?: No   Time Spent (min): 30

## 2025-03-07 NOTE — TELEPHONE ENCOUNTER
Fabiana's daughter, Stephanie, returned an adherence call concerning Atorvastatin 10 mg.     Stephanie states that her mom is still taking Atorvastatin 10 mg. She has been on this medication for years and denies any side effects or issues with the medication. She has about 12 tablets remaining. She had tablets remaninig from her previous fill when she got discharged and they filled her medication.     I informed her that with [facility], her mom can get a 100 day supply. I asked if she would be interested in her mom's provider sending a 100 day supply to [facility], and she said that would be great since her mom is out of refills.     Routing to original CSS & pharmacist for refill.     Janice Mcclendon CPhT  Population Health    [facility]   [phone] option 1     For Pharmacy Admin Tracking Only    Program: [employer]  CPA in place:  No  Recommendation Provided To: Patient/Caregiver: 1 via Telephone  Intervention Detail: Adherence Monitorin  Intervention Accepted By: Patient/Caregiver: 1  Gap Closed?: Yes   Time Spent (min): 10

## 2025-05-09 ENCOUNTER — OFFICE VISIT (OUTPATIENT)
Dept: ORTHOPEDIC SURGERY | Age: 80
End: 2025-05-09
Payer: MEDICARE

## 2025-05-09 VITALS — BODY MASS INDEX: 28.34 KG/M2 | HEIGHT: 64 IN | WEIGHT: 166 LBS

## 2025-05-09 DIAGNOSIS — Z96.651 H/O TOTAL KNEE REPLACEMENT, RIGHT: Primary | ICD-10-CM

## 2025-05-09 PROCEDURE — 1125F AMNT PAIN NOTED PAIN PRSNT: CPT | Performed by: STUDENT IN AN ORGANIZED HEALTH CARE EDUCATION/TRAINING PROGRAM

## 2025-05-09 PROCEDURE — 1159F MED LIST DOCD IN RCRD: CPT | Performed by: STUDENT IN AN ORGANIZED HEALTH CARE EDUCATION/TRAINING PROGRAM

## 2025-05-09 PROCEDURE — 99213 OFFICE O/P EST LOW 20 MIN: CPT | Performed by: STUDENT IN AN ORGANIZED HEALTH CARE EDUCATION/TRAINING PROGRAM

## 2025-05-09 PROCEDURE — 1123F ACP DISCUSS/DSCN MKR DOCD: CPT | Performed by: STUDENT IN AN ORGANIZED HEALTH CARE EDUCATION/TRAINING PROGRAM

## 2025-05-09 NOTE — PROGRESS NOTES
Chief Complaint  Knee Pain (CK RT. KNEE)      History of Present Illness:  The patient is here for repeat evaluation regarding her right total knee arthroplasty.  The patient has been doing physical therapy at [facility] as she consistently has weakness from her back surgery.  She is still feeling some instability in the knee as it feels weaker than the opposite side.  She denies much pain in the knee.    Prior HPI 11/15/2024:  The patient is here for repeat evaluation regarding her right knee.  The patient is nearly a year out from her knee replacement.  The patient was doing well until she had a major back surgery in August 2024.  Since then she has had some constant pain in the knee in the groin.  She was not able to get around do much after the back surgery.  She also reports some bilateral hip pain at night when she tries to go to sleep.  This is all new from the surgery.    Prior HPI 6/21/2024:  The patient is here today for 6-month evaluation regarding her right knee.  The patient is doing well.  She denies any major setbacks.  She has a little bit of numbness along the lateral aspect of her knee.    Prior HPI 2/23/2024:  The patient is here for repeat evaluation regarding her right knee.  The patient is 2 and half months out.  She is doing very well.  She has about another month of the blood thinner left.    Prior HPI 1/12/2024:  The patient is here for repeat evaluation regarding her right knee.  The patient unfortunately was found to have a blood clot in [facility] and was placed on Xarelto.  Fortunately the blood clot was distal to the knee.  The patient had to hold physical therapy for a little bit.  She is worried that this may have set her back.  Her pain is improving.  She is walking with a walker today.  She is a month out from the surgery.    Prior HPI 12/22/2023:  Fabiana Ceballos is a pleasant 79 y.o. female here today for her first postop evaluation for her right total knee

## (undated) DEVICE — SUTURE ABSORBABLE BRAIDED 2-0 CT-1 27 IN UD VICRYL J259H

## (undated) DEVICE — Device

## (undated) DEVICE — SUTURE STRATAFIX SPRL SZ 1 L14IN ABSRB VLT L48CM CTX 1/2 SXPD2B405

## (undated) DEVICE — BLADE SURG SAW SAG S STL AGG 905MM LEN 185MM W 127MM THCK

## (undated) DEVICE — SUTURE VCRL SZ 1 L27IN ABSRB UD L36MM CT-1 1/2 CIR JJ40G

## (undated) DEVICE — SPLINT KNEE L20IN FOR 32IN THGH UNIV FOAM 3 PC DSGN TRIMMED

## (undated) DEVICE — COVER,MAYO STAND,STERILE: Brand: MEDLINE

## (undated) DEVICE — GLOVE ORANGE PI 7 1/2   MSG9075

## (undated) DEVICE — NON RIMMED SPEED PIN 65MM STERILE

## (undated) DEVICE — LIGHT HANDLE: Brand: DEVON

## (undated) DEVICE — HANDPIECE SET WITH HIGH FLOW TIP AND SUCTION TUBE: Brand: INTERPULSE

## (undated) DEVICE — STERILE PRESSURE PROTECTOR PAD® FOR DE MAYO UNIVERSAL DISTRACTOR® (10/CASE): Brand: DE MAYO UNIVERSAL DISTRACTOR®

## (undated) DEVICE — BOWL AND CEMENT CARTRIDGE WITH BREAKAWAY FEMORAL NOZZLE AND MEDIUM PRESSURIZER: Brand: ACM

## (undated) DEVICE — 3M™ IOBAN™ 2 ANTIMICROBIAL INCISE DRAPE 6640EZ: Brand: IOBAN™ 2

## (undated) DEVICE — SOLUTION WND CLN 1000ML FOR VAC VERAFLO THER PRONTOSAN

## (undated) DEVICE — MHCZ TOTAL KNEE: Brand: MEDLINE INDUSTRIES, INC.

## (undated) DEVICE — SUTURE MCRYL + SZ 4-0 L18IN ABSRB UD L19MM PS-2 3/8 CIR MCP496G

## (undated) DEVICE — BOOT POS LEG DEMAYO

## (undated) DEVICE — NAVIO FLAT MARKERS: Brand: NAVIO

## (undated) DEVICE — ADHESIVE SKIN CLOSURE WND 8.661X1.5 IN 22 CM LIQUIBAND SECUR

## (undated) DEVICE — DRESSING FOAM W4XL10IN SIL RECT ADH WTRPRF FLM BK W/ BORD

## (undated) DEVICE — 3M™ COBAN™ SELF-ADHERENT WRAP, 1586S, STERILE, 6 IN X 5 YD (15 CM X 4,5 M), 12 ROLLS/CASE: Brand: 3M™ COBAN™

## (undated) DEVICE — DEVICE POS W4INXL5YD KNEE SURG FOAM PD SELF ADH WRP DEMAYO

## (undated) DEVICE — DRAPE C ARM UNIV W41XL74IN CLR PLAS XR VELC CLSR POLY STRP

## (undated) DEVICE — Z DISCONTINUED PER MEDLINE USE 2752023 DRESSING FOAM W7.62XL7.62CM SIL ADH WTRPRF FLM BK SQ SHP

## (undated) DEVICE — HOOD, PEEL-AWAY: Brand: FLYTE

## (undated) DEVICE — NEEDLE SPNL L3.5IN PNK HUB S STL REG WALL FIT STYL W/ QNCKE

## (undated) DEVICE — RIMMED SPEED PIN 45MM STERILE

## (undated) DEVICE — COMPONENT KNEE LOWER EXTREMITIES. ANCIL ZIRCONIUM NAVIO DISP

## (undated) DEVICE — SKIN AFFIX SURG ADHESIVE 72/CS 0.55ML: Brand: MEDLINE

## (undated) DEVICE — ZIMMER® STERILE DISPOSABLE TOURNIQUET CUFF WITH PLC, DUAL PORT, SINGLE BLADDER, 34 IN. (86 CM)

## (undated) DEVICE — SUTURE ETHBND EXCEL SZ 2 L30IN NONABSORBABLE GRN L40MM V-37 MX69G